# Patient Record
Sex: FEMALE | Race: WHITE | Employment: UNEMPLOYED | RURAL
[De-identification: names, ages, dates, MRNs, and addresses within clinical notes are randomized per-mention and may not be internally consistent; named-entity substitution may affect disease eponyms.]

---

## 2017-02-13 ENCOUNTER — OFFICE VISIT (OUTPATIENT)
Dept: FAMILY MEDICINE CLINIC | Age: 62
End: 2017-02-13

## 2017-02-13 VITALS
HEART RATE: 72 BPM | BODY MASS INDEX: 25.11 KG/M2 | OXYGEN SATURATION: 98 % | RESPIRATION RATE: 16 BRPM | DIASTOLIC BLOOD PRESSURE: 62 MMHG | SYSTOLIC BLOOD PRESSURE: 133 MMHG | HEIGHT: 61 IN | TEMPERATURE: 98 F | WEIGHT: 133 LBS

## 2017-02-13 DIAGNOSIS — J20.9 ACUTE BRONCHITIS, UNSPECIFIED ORGANISM: ICD-10-CM

## 2017-02-13 DIAGNOSIS — F17.200 SMOKER: ICD-10-CM

## 2017-02-13 DIAGNOSIS — E78.2 MIXED HYPERLIPIDEMIA: Primary | Chronic | ICD-10-CM

## 2017-02-13 DIAGNOSIS — G89.29 CHRONIC PAIN OF LEFT KNEE: ICD-10-CM

## 2017-02-13 DIAGNOSIS — M25.562 CHRONIC PAIN OF LEFT KNEE: ICD-10-CM

## 2017-02-13 DIAGNOSIS — M79.604 LEG PAIN, RIGHT: Chronic | ICD-10-CM

## 2017-02-13 RX ORDER — AZITHROMYCIN 250 MG/1
TABLET, FILM COATED ORAL
Qty: 6 TAB | Refills: 0 | Status: SHIPPED | OUTPATIENT
Start: 2017-02-13 | End: 2017-02-18

## 2017-02-13 NOTE — PROGRESS NOTES
Reviewed record in preparation for visit and have necessary documentation  Pt did not bring medication to office visit for review  Information was given to pt on Advanced Directives, Living Will  opportunity was given for questions  Goals that were addressed and/or need to be completed during or after this appointment include   Health Maintenance Due   Topic Date Due    BREAST CANCER SCRN MAMMOGRAM  06/14/2012    PAP AKA CERVICAL CYTOLOGY  05/08/2016

## 2017-02-13 NOTE — MR AVS SNAPSHOT
Visit Information Date & Time Provider Department Dept. Phone Encounter #  
 2/13/2017 10:30 AM Ryan Sargent MD [facility] [place] [id number] Follow-up Instructions Return in about 6 months (around 8/13/2017). Upcoming Health Maintenance Date Due  
 BREAST CANCER SCRN MAMMOGRAM 6/14/2012 PAP AKA CERVICAL CYTOLOGY 5/8/2016 COLONOSCOPY 7/27/2019 DTaP/Tdap/Td series (2 - Td) 12/10/2024 Allergies as of 2/13/2017  Review Complete On: 2/13/2017 By: Gen Abreu LPN Severity Noted Reaction Type Reactions Percocet [Oxycodone-acetaminophen]  03/23/2010    Nausea and Vomiting Current Immunizations  Reviewed on 3/25/2014 Name Date Influenza Vaccine 10/27/2015, 12/24/2012 Influenza Vaccine (Quad) PF 10/11/2016 Influenza Vaccine Split 10/20/2010, 9/10/2009 PPD 12/12/2011 Pneumococcal Conjugate (PCV-13) 12/29/2015 Pneumococcal Polysaccharide (PPSV-23) 3/25/2014 Pneumococcal Vaccine (Unspecified Type) 1/19/2005 TD Vaccine 3/22/2000 Tdap 12/10/2014 12:14 PM  
 Zoster Vaccine, Live 1/19/2016 Not reviewed this visit You Were Diagnosed With   
  
 Codes Comments Mixed hyperlipidemia    -  Primary ICD-10-CM: P12.7 ICD-9-CM: 272.2 Leg pain, right     ICD-10-CM: M79.604 ICD-9-CM: 729.5 Smoker     ICD-10-CM: L01.689 ICD-9-CM: 305.1 Acute bronchitis, unspecified organism     ICD-10-CM: J20.9 ICD-9-CM: 466.0 Chronic pain of left knee     ICD-10-CM: M25.562, G89.29 ICD-9-CM: 719.46, 338.29 Vitals BP Pulse Temp Resp Height(growth percentile) Weight(growth percentile) 133/62 (BP 1 Location: Right arm, BP Patient Position: Sitting) 72 98 °F (36.7 °C) (Oral) 16 5' 1\" (1.549 m) 133 lb (60.3 kg) LMP SpO2 BMI OB Status Smoking Status 01/01/2007 98% 25.13 kg/m2 Postmenopausal Current Every Day Smoker Vitals History BMI and BSA Data Body Mass Index Body Surface Area  
 25.13 kg/m 2 1.61 m 2 Preferred Pharmacy Pharmacy Name Phone [address] [phone] Your Updated Medication List  
  
   
This list is accurate as of: 2/13/17 11:05 AM.  Always use your most recent med list.  
  
  
  
  
 Lynco Keepers Take  by mouth. ASPIR-81 81 mg tablet Generic drug:  aspirin delayed-release Take 81 mg by mouth daily. azithromycin 250 mg tablet Commonly known as:  [place] Abu Take 2 tablets today, then take 1 tablet daily FISH OIL PO Take  by mouth.  
  
 gabapentin 100 mg capsule Commonly known as:  NEURONTIN Take 4 Caps by mouth nightly. Indications: NEUROPATHIC PAIN  
  
 promethazine 25 mg tablet Commonly known as:  PHENERGAN Take 1 Tab by mouth every six (6) hours as needed for Nausea. Indications: MOTION SICKNESS  
  
 VITAMIN DAILY PO Take  by mouth. Prescriptions Sent to Pharmacy Refills  
 azithromycin (ZITHROMAX) 250 mg tablet 0 Sig: Take 2 tablets today, then take 1 tablet daily Class: Normal  
 Pharmacy: [address] [facility] #: [phone] We Performed the Following LIPID PANEL [21612 CPT(R)] METABOLIC PANEL, COMPREHENSIVE [07656 CPT(R)] Follow-up Instructions Return in about 6 months (around 8/13/2017). To-Do List   
 02/13/2017 Imaging:  XR CHEST PA LAT   
  
 02/13/2017 Imaging:  XR KNEE LT 3 V   
  
 02/13/2017 11:10 AM  
  Appointment with  RAD XR RM 1 at [address] [place] [address] ([phone])  
  
 02/13/2017 11:15 AM  
  Appointment with [facility] RAD XR RM 1 at [address] [place] [address] ([phone]) [facility]! Dear Savanna Cervantes: 
Thank you for requesting a Dodonation account. Our records indicate that you already have an active Dodonation account.   You can access your account anytime at [email or web address] Did you know that you can access your hospital and ER discharge instructions at any time in StudyTube? You can also review all of your test results from your hospital stay or ER visit. Additional Information If you have questions, please visit the Frequently Asked Questions section of the StudyTube website at [email or web address] Remember, StudyTube is NOT to be used for urgent needs. For medical emergencies, dial 911. Now available from your iPhone and Android! Please provide this summary of care documentation to your next provider. Your primary care clinician is listed as [address]. If you have any questions after today's visit, please call [phone].

## 2017-02-14 ENCOUNTER — TELEPHONE (OUTPATIENT)
Dept: FAMILY MEDICINE CLINIC | Age: 62
End: 2017-02-14

## 2017-02-14 LAB
ALBUMIN SERPL-MCNC: 4.4 G/DL (ref 3.6–4.8)
ALBUMIN/GLOB SERPL: 1.8 {RATIO} (ref 1.1–2.5)
ALP SERPL-CCNC: 94 IU/L (ref 39–117)
ALT SERPL-CCNC: 17 IU/L (ref 0–32)
AST SERPL-CCNC: 18 IU/L (ref 0–40)
BILIRUB SERPL-MCNC: 0.2 MG/DL (ref 0–1.2)
BUN SERPL-MCNC: 20 MG/DL (ref 8–27)
BUN/CREAT SERPL: 28 (ref 11–26)
CALCIUM SERPL-MCNC: 9.5 MG/DL (ref 8.7–10.3)
CHLORIDE SERPL-SCNC: 101 MMOL/L (ref 96–106)
CHOLEST SERPL-MCNC: 218 MG/DL (ref 100–199)
CO2 SERPL-SCNC: 25 MMOL/L (ref 18–29)
CREAT SERPL-MCNC: 0.71 MG/DL (ref 0.57–1)
GLOBULIN SER CALC-MCNC: 2.4 G/DL (ref 1.5–4.5)
GLUCOSE SERPL-MCNC: 85 MG/DL (ref 65–99)
HDLC SERPL-MCNC: 70 MG/DL
LDLC SERPL CALC-MCNC: 130 MG/DL (ref 0–99)
POTASSIUM SERPL-SCNC: 4.5 MMOL/L (ref 3.5–5.2)
PROT SERPL-MCNC: 6.8 G/DL (ref 6–8.5)
SODIUM SERPL-SCNC: 142 MMOL/L (ref 134–144)
TRIGL SERPL-MCNC: 91 MG/DL (ref 0–149)
VLDLC SERPL CALC-MCNC: 18 MG/DL (ref 5–40)

## 2017-02-14 NOTE — TELEPHONE ENCOUNTER
Phone call to patient. Advised patient per Dr. Zarina Madden to make sure she is taking 1200 mg of calcium a day and at least 400 international units of vitamin D a day. Patient verbalized understanding.

## 2017-02-14 NOTE — TELEPHONE ENCOUNTER
She gets insurance we need to make sure that she gets a DEXA scan.  We do not want the osteopenia to develop into osteoporosis.  She needs to make sure she is taking 1200 mg of calcium a day and at least 400 international units of vitamin D a day.    Dr. Reji Kingston
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
## 2017-03-03 ENCOUNTER — TELEPHONE (OUTPATIENT)
Dept: FAMILY MEDICINE CLINIC | Age: 62
End: 2017-03-03

## 2017-03-03 DIAGNOSIS — J40 BRONCHITIS: Primary | ICD-10-CM

## 2017-03-03 RX ORDER — BENZONATATE 200 MG/1
200 CAPSULE ORAL
Qty: 21 CAP | Refills: 1 | Status: SHIPPED | OUTPATIENT
Start: 2017-03-03 | End: 2017-03-10

## 2017-03-03 NOTE — TELEPHONE ENCOUNTER
Nichelle CARIAS [facility] Front Office Pool                     Patient stated that she is still coughing during the day and at night since completing the antibiotic that Dr. Jodie Reich prescribed. She would like something to help with excessive coughing. Currently taking Mucinex DM and has gotten only some relief.  Best contact number is [phone]. Pharmacy is [facility]. Pharmacy info is on file.

## 2017-08-11 ENCOUNTER — OFFICE VISIT (OUTPATIENT)
Dept: FAMILY MEDICINE CLINIC | Age: 62
End: 2017-08-11

## 2017-08-11 VITALS
TEMPERATURE: 98.4 F | WEIGHT: 135.8 LBS | DIASTOLIC BLOOD PRESSURE: 63 MMHG | HEIGHT: 61 IN | HEART RATE: 73 BPM | RESPIRATION RATE: 16 BRPM | SYSTOLIC BLOOD PRESSURE: 130 MMHG | BODY MASS INDEX: 25.64 KG/M2 | OXYGEN SATURATION: 96 %

## 2017-08-11 DIAGNOSIS — K63.5 HYPERPLASTIC COLONIC POLYP, UNSPECIFIED PART OF COLON: Chronic | ICD-10-CM

## 2017-08-11 DIAGNOSIS — E78.2 MIXED HYPERLIPIDEMIA: Primary | Chronic | ICD-10-CM

## 2017-08-11 DIAGNOSIS — M17.12 ARTHRITIS OF LEFT KNEE: Chronic | ICD-10-CM

## 2017-08-11 DIAGNOSIS — Z12.31 ENCOUNTER FOR SCREENING MAMMOGRAM FOR HIGH-RISK PATIENT: ICD-10-CM

## 2017-08-11 DIAGNOSIS — M79.604 LEG PAIN, RIGHT: Chronic | ICD-10-CM

## 2017-08-11 DIAGNOSIS — Z87.19 HISTORY OF DIVERTICULITIS: Chronic | ICD-10-CM

## 2017-08-11 RX ORDER — MELOXICAM 15 MG/1
15 TABLET ORAL DAILY
Qty: 30 TAB | Refills: 4 | Status: SHIPPED | OUTPATIENT
Start: 2017-08-11 | End: 2017-12-15 | Stop reason: DRUGHIGH

## 2017-08-11 RX ORDER — ASCORBIC ACID 500 MG
TABLET ORAL
COMMUNITY
End: 2020-07-16
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
## 2017-08-11 NOTE — PROGRESS NOTES
Chief Complaint   Patient presents with    Cholesterol Problem     Body mass index is 25.66 kg/(m^2).     Reviewed record in preparation for visit and have necessary documentation  Pt did not bring medication to office visit for review  Information was given to pt on Advanced Directives, Living Will  Information was given on Shingles Vaccine  Opportunity was given for questions  Goals that were addressed and/or need to be completed after this appointment include:     Health Maintenance Due   Topic Date Due    BREAST CANCER SCRN MAMMOGRAM  06/14/2012    PAP AKA CERVICAL CYTOLOGY  05/08/2016    INFLUENZA AGE 9 TO ADULT  08/01/2017

## 2017-08-11 NOTE — MR AVS SNAPSHOT
Visit Information Date & Time Provider Department Dept. Phone Encounter #  
 8/11/2017  8:40 AM Jimbo Aguilar MD Kailyn Tucker [id number] Follow-up Instructions Return in about 4 months (around 12/11/2017), or if symptoms worsen or fail to improve. Upcoming Health Maintenance Date Due  
 BREAST CANCER SCRN MAMMOGRAM 6/14/2012 PAP AKA CERVICAL CYTOLOGY 5/8/2016 INFLUENZA AGE 9 TO ADULT 8/1/2017 COLONOSCOPY 7/27/2019 DTaP/Tdap/Td series (2 - Td) 12/10/2024 Allergies as of 8/11/2017  Review Complete On: 8/11/2017 By: iJmbo Aguilar MD  
  
 Severity Noted Reaction Type Reactions Percocet [Oxycodone-acetaminophen]  03/23/2010    Nausea and Vomiting Current Immunizations  Reviewed on 3/25/2014 Name Date Influenza Vaccine 10/27/2015, 12/24/2012 Influenza Vaccine (Quad) PF 10/11/2016 Influenza Vaccine Split 10/20/2010, 9/10/2009 PPD 12/12/2011 Pneumococcal Conjugate (PCV-13) 12/29/2015 Pneumococcal Polysaccharide (PPSV-23) 3/25/2014 TD Vaccine 3/22/2000 Tdap 12/10/2014 12:14 PM  
 ZZZ-RETIRED (DO NOT USE) Pneumococcal Vaccine (Unspecified Type) 1/19/2005 Zoster Vaccine, Live 1/19/2016 Not reviewed this visit You Were Diagnosed With   
  
 Codes Comments Mixed hyperlipidemia    -  Primary ICD-10-CM: J47.4 ICD-9-CM: 272.2 Leg pain, right     ICD-10-CM: M79.604 ICD-9-CM: 729.5 Hyperplastic colonic polyp, unspecified part of colon     ICD-10-CM: K63.5 ICD-9-CM: 211.3 History of diverticulitis     ICD-10-CM: Z87.19 ICD-9-CM: V12.70 Arthritis of left knee     ICD-10-CM: M17.12 
ICD-9-CM: 716.96 Encounter for screening mammogram for high-risk patient     ICD-10-CM: Z12.31 
ICD-9-CM: V76.11 Vitals BP Pulse Temp Resp Height(growth percentile) Weight(growth percentile)  130/63 (BP 1 Location: Left arm, BP Patient Position: Sitting) 73 98.4 °F (36.9 °C) (Oral) 16 5' 1\" (1.549 m) 135 lb 12.8 oz (61.6 kg) LMP SpO2 BMI OB Status Smoking Status 01/01/2007 96% 25.66 kg/m2 Postmenopausal Current Every Day Smoker Vitals History BMI and BSA Data Body Mass Index Body Surface Area  
 25.66 kg/m 2 1.63 m 2 Preferred Pharmacy Pharmacy Name Phone [facility] [place] [address] [facility] [phone] Your Updated Medication List  
  
   
This list is accurate as of: 8/11/17  9:02 AM.  Always use your most recent med list.  
  
  
  
  
 ASPIR-81 81 mg tablet Generic drug:  aspirin delayed-release Take 81 mg by mouth daily. FISH OIL PO Take  by mouth.  
  
 gabapentin 100 mg capsule Commonly known as:  NEURONTIN  
TAKE 4 CAPSULES BY MOUTH NIGHTLY  
  
 meloxicam 15 mg tablet Commonly known as:  MOBIC Take 1 Tab by mouth daily. Indications: OSTEOARTHRITIS  
  
 potassium 99 mg tablet Take 99 mg by mouth daily. promethazine 25 mg tablet Commonly known as:  PHENERGAN Take 1 Tab by mouth every six (6) hours as needed for Nausea. Indications: MOTION SICKNESS  
  
 VITAMIN C 500 mg tablet Generic drug:  ascorbic acid (vitamin C) Take  by mouth. VITAMIN DAILY PO Take  by mouth. Prescriptions Sent to Pharmacy Refills  
 meloxicam (MOBIC) 15 mg tablet 4 Sig: Take 1 Tab by mouth daily. Indications: OSTEOARTHRITIS Class: Normal  
 Pharmacy: [address] [place] [address] #: [phone] Route: Oral  
  
We Performed the Following HEMOGLOBIN [id number] CPT(R)] LIPID PANEL [57299 CPT(R)] METABOLIC PANEL, COMPREHENSIVE [70720 CPT(R)] Follow-up Instructions Return in about 4 months (around 12/11/2017), or if symptoms worsen or fail to improve. To-Do List   
 08/18/2017 Imaging:  STEPHANIE MAMMO BI DX INCL CAD Patient Instructions Osteoarthritis: Care Instructions Your Care Instructions Arthritis is a common health problem in which the joints are inflamed. There are several kinds of arthritis. Osteoarthritis is caused by a breakdown of cartilage, the hard, thick tissue that cushions the joints. It causes pain, stiffness, and swelling, often in the spine, fingers, hips, and knees. Osteoarthritis can happen at any age, but it is most common in older people. Osteoarthritis never goes away completely, but it can be controlled. Medicine and home treatment can reduce the pain and prevent the arthritis from getting worse. Follow-up care is a key part of your treatment and safety. Be sure to make and go to all appointments, and call your doctor if you are having problems. It's also a good idea to know your test results and keep a list of the medicines you take. How can you care for yourself at home? · Take a warm shower or bath in the morning to relieve stiffness. Avoid sitting still afterwards. · If the joint is not swollen, use moist heat, like a warm, damp towel, for 20 to 30 minutes, 2 or 3 times a day. Do not use heat on a swollen joint. · If the joint is swollen, use ice or cold packs for 10 to 20 minutes, once an hour. Cold will help relieve pain and reduce inflammation. Put a thin cloth between the ice and your skin. · To prevent stiffness, gently move the joint through its full range of motion several times a day. · If the joint hurts, avoid activities that put a strain on it for a few days. Take rest breaks throughout the day. · Get regular exercise. Walking, swimming, yoga, biking, leon chi, and water aerobics are good exercises that are gentle on the joints. · Reach and stay at a healthy weight. If you need to lose or maintain weight, regular exercise and a healthy diet will help. Extra weight can strain the joints, especially the knees and hips, and make the pain worse. Losing even a few pounds may help. · Take pain medicines exactly as directed. ¨ If the doctor gave you a prescription medicine for pain, take it as prescribed. ¨ If you are not taking a prescription pain medicine, ask your doctor if you can take an over-the-counter medicine. When should you call for help? Call your doctor now or seek immediate medical care if: · The pain is so bad that you cannot use the joint. · You have sudden back pain with weakness in your legs or loss of bowel or bladder control. · Your stools are black and tarlike or have streaks of blood. · You have severe pain and swelling in more than one joint. Watch closely for changes in your health, and be sure to contact your doctor if: 
· You have side effects from the medicines, like belly pain, ongoing heartburn, or nausea. · Joint pain continues for more than 6 weeks, and home treatment is not helping. Where can you learn more? Go to http://bea-aurora.info/. Enter C715 in the search box to learn more about \"Osteoarthritis: Care Instructions. \" Current as of: November 28, 2016 Content Version: 11.3 © 8576-9569 independenceIT. Care instructions adapted under license by Cursa.me (which disclaims liability or warranty for this information). If you have questions about a medical condition or this instruction, always ask your healthcare professional. [address] any warranty or liability for your use of this information. Introducing [facility]! Dear Nancy Hammonds: 
Thank you for requesting a Powelectrics account. Our records indicate that you already have an active Powelectrics account. You can access your account anytime at [email or web address] Did you know that you can access your hospital and ER discharge instructions at any time in Powelectrics? You can also review all of your test results from your hospital stay or ER visit. Additional Information If you have questions, please visit the Frequently Asked Questions section of the RiG[employer] website at [email or web address] Remember, [employer] is NOT to be used for urgent needs. For medical emergencies, dial 911. Now available from your iPhone and Android! Please provide this summary of care documentation to your next provider. Your primary care clinician is listed as Π[address] 90. If you have any questions after today's visit, please call [phone].

## 2017-08-11 NOTE — PATIENT INSTRUCTIONS
Osteoarthritis: Care Instructions  Your Care Instructions    Arthritis is a common health problem in which the joints are inflamed. There are several kinds of arthritis. Osteoarthritis is caused by a breakdown of cartilage, the hard, thick tissue that cushions the joints. It causes pain, stiffness, and swelling, often in the spine, fingers, hips, and knees. Osteoarthritis can happen at any age, but it is most common in older people. Osteoarthritis never goes away completely, but it can be controlled. Medicine and home treatment can reduce the pain and prevent the arthritis from getting worse. Follow-up care is a key part of your treatment and safety. Be sure to make and go to all appointments, and call your doctor if you are having problems. It's also a good idea to know your test results and keep a list of the medicines you take. How can you care for yourself at home? · Take a warm shower or bath in the morning to relieve stiffness. Avoid sitting still afterwards. · If the joint is not swollen, use moist heat, like a warm, damp towel, for 20 to 30 minutes, 2 or 3 times a day. Do not use heat on a swollen joint. · If the joint is swollen, use ice or cold packs for 10 to 20 minutes, once an hour. Cold will help relieve pain and reduce inflammation. Put a thin cloth between the ice and your skin. · To prevent stiffness, gently move the joint through its full range of motion several times a day. · If the joint hurts, avoid activities that put a strain on it for a few days. Take rest breaks throughout the day. · Get regular exercise. Walking, swimming, yoga, biking, leon chi, and water aerobics are good exercises that are gentle on the joints. · Reach and stay at a healthy weight. If you need to lose or maintain weight, regular exercise and a healthy diet will help. Extra weight can strain the joints, especially the knees and hips, and make the pain worse. Losing even a few pounds may help.   · Take pain medicines exactly as directed. ¨ If the doctor gave you a prescription medicine for pain, take it as prescribed. ¨ If you are not taking a prescription pain medicine, ask your doctor if you can take an over-the-counter medicine. When should you call for help? Call your doctor now or seek immediate medical care if:  · The pain is so bad that you cannot use the joint. · You have sudden back pain with weakness in your legs or loss of bowel or bladder control. · Your stools are black and tarlike or have streaks of blood. · You have severe pain and swelling in more than one joint. Watch closely for changes in your health, and be sure to contact your doctor if:  · You have side effects from the medicines, like belly pain, ongoing heartburn, or nausea. · Joint pain continues for more than 6 weeks, and home treatment is not helping. Where can you learn more? Go to http://bea-aurora.info/. Enter G265 in the search box to learn more about \"Osteoarthritis: Care Instructions. \"  Current as of: November 28, 2016  Content Version: 11.3  © 0236-6555 CloudSway. Care instructions adapted under license by CausePlay (which disclaims liability or warranty for this information). If you have questions about a medical condition or this instruction, always ask your healthcare professional. [address] any warranty or liability for your use of this information.

## 2017-08-19 LAB
ALBUMIN SERPL-MCNC: 4.1 G/DL (ref 3.6–4.8)
ALBUMIN/GLOB SERPL: 1.6 {RATIO} (ref 1.2–2.2)
ALP SERPL-CCNC: 74 IU/L (ref 39–117)
ALT SERPL-CCNC: 15 IU/L (ref 0–32)
AST SERPL-CCNC: 18 IU/L (ref 0–40)
BILIRUB SERPL-MCNC: 0.5 MG/DL (ref 0–1.2)
BUN SERPL-MCNC: 22 MG/DL (ref 8–27)
BUN/CREAT SERPL: 24 (ref 12–28)
CALCIUM SERPL-MCNC: 9.4 MG/DL (ref 8.7–10.3)
CHLORIDE SERPL-SCNC: 100 MMOL/L (ref 96–106)
CHOLEST SERPL-MCNC: 215 MG/DL (ref 100–199)
CO2 SERPL-SCNC: 26 MMOL/L (ref 18–29)
CREAT SERPL-MCNC: 0.9 MG/DL (ref 0.57–1)
GLOBULIN SER CALC-MCNC: 2.5 G/DL (ref 1.5–4.5)
GLUCOSE SERPL-MCNC: 79 MG/DL (ref 65–99)
HDLC SERPL-MCNC: 65 MG/DL
HGB BLD-MCNC: 13.8 G/DL (ref 11.1–15.9)
LDLC SERPL CALC-MCNC: 138 MG/DL (ref 0–99)
POTASSIUM SERPL-SCNC: 5.2 MMOL/L (ref 3.5–5.2)
PROT SERPL-MCNC: 6.6 G/DL (ref 6–8.5)
SODIUM SERPL-SCNC: 140 MMOL/L (ref 134–144)
TRIGL SERPL-MCNC: 62 MG/DL (ref 0–149)
VLDLC SERPL CALC-MCNC: 12 MG/DL (ref 5–40)

## 2017-08-25 ENCOUNTER — HOSPITAL ENCOUNTER (OUTPATIENT)
Dept: LAB | Age: 62
Discharge: HOME | End: 2017-08-25
Payer: SELF-PAY

## 2017-08-25 ENCOUNTER — OFFICE VISIT (OUTPATIENT)
Dept: FAMILY MEDICINE CLINIC | Age: 62
End: 2017-08-25

## 2017-08-25 VITALS
RESPIRATION RATE: 18 BRPM | TEMPERATURE: 98.2 F | OXYGEN SATURATION: 98 % | DIASTOLIC BLOOD PRESSURE: 66 MMHG | HEIGHT: 61 IN | SYSTOLIC BLOOD PRESSURE: 146 MMHG | BODY MASS INDEX: 25.49 KG/M2 | WEIGHT: 135 LBS | HEART RATE: 65 BPM

## 2017-08-25 DIAGNOSIS — Z78.0 POST-MENOPAUSAL: ICD-10-CM

## 2017-08-25 DIAGNOSIS — Z01.419 WELL WOMAN EXAM WITH ROUTINE GYNECOLOGICAL EXAM: Primary | ICD-10-CM

## 2017-08-25 PROCEDURE — 88175 CYTOPATH C/V AUTO FLUID REDO: CPT | Performed by: FAMILY MEDICINE

## 2017-08-25 PROCEDURE — 87624 HPV HI-RISK TYP POOLED RSLT: CPT | Performed by: FAMILY MEDICINE

## 2017-08-25 NOTE — PATIENT INSTRUCTIONS

## 2017-08-25 NOTE — PROGRESS NOTES
Well Woman Check Up       Subjective:     60 y.o. CaucasianF for routine annual exam    LMP:Patient's last menstrual period was 01/01/2007. Menses: No, postmenopausal    Method of protection: none. Social History: not sexually active. Pertinent past medical history: current smoker. PAP History:   5/2013: Negative cytology    Colonoscopy:  NExt due 2019    Mammogram:    Menarche:  Post-menopausal  LMP: Patient's last menstrual period was 01/01/2007. .    # of Children:  2   Hysterectomy/oophorectomy:  No/No.    Breast Bx: No.    Hx of Breast Feeding:  no. BCP:  No.   Hormone therapy: No.   FamHx of breast ca in first degree relative: No    DEXA: None    Lipids: 8/18      Allergies   Allergen Reactions    Percocet [Oxycodone-Acetaminophen] Nausea and Vomiting     Past Surgical History:   Procedure Laterality Date    ABDOMEN SURGERY PROC UNLISTED      ruptured spleen    HX ORTHOPAEDIC      multiple fxs     Family History   Problem Relation Age of Onset    Diabetes Mother     Hypertension Mother     Heart Disease Mother     Thyroid Disease Mother     Diabetes Sister     Heart Disease Sister     Hypertension Sister     Thyroid Disease Sister     Cancer Brother      brain     Social History   Substance Use Topics    Smoking status: Current Every Day Smoker     Packs/day: 0.50     Years: 25.00    Smokeless tobacco: Never Used    Alcohol use Yes      Comment: occasional        ROS:  Feeling well. No dyspnea or chest pain on exertion. No abdominal pain, change in bowel habits, black or bloody stools. No urinary tract symptoms. GYN ROS: no breast pain or new or enlarging lumps on self exam, no vaginal bleeding, no discharge or pelvic pain, no hot flashes. No neurological complaints.     Objective:     Visit Vitals    /66 (BP 1 Location: Left arm, BP Patient Position: Sitting)    Pulse 65    Temp 98.2 °F (36.8 °C) (Oral)    Resp 18    Ht 5' 1\" (1.549 m)    Wt 135 lb (61.2 kg)    LMP 01/01/2007    SpO2 98%    BMI 25.51 kg/m2     Gen: The patient appears well, alert, oriented x 3, in no distress. HEENT:  Normal, atraumatic, FREDI. Neck: supple. No adenopathy or thyromegaly. Lungs:  clear, good air entry, no wheezes, rhonchi or rales. CV: S1 and S2 normal, no murmurs, regular rate and rhythm. Abdomen: soft without tenderness, guarding, mass or organomegaly. Extremities:  no edema, normal peripheral pulses. Neurological:normal, no focal findings. BREAST EXAM: breasts appear normal, no suspicious masses, no skin or nipple changes or axillary nodes. PELVIC EXAM: normal external genitalia, vulva, vagina, cervix, uterus and adnexa exam chaperoned by: Aydee Candelario    Assessment/Plan:     Encounter Diagnoses     ICD-10-CM ICD-9-CM   1. Well woman exam with routine gynecological exam Z01.419 V72.31   2. Post-menopausal Z78.0 V49.81       1. Well woman exam with routine gynecological exam  Has order for Mammogram.   - PAP IG, APTIMA HPV AND RFX 16/18,45 ([id number])    2. Post-menopausal  - DEXA BONE DENSITY STUDY AXIAL; Future        Jessica Dewitt MD  08/25/17    Follow-up Disposition:  Return in about 1 year (around 8/25/2018) for [address] [place] [address],[address].     Future Appointments  Date Time Provider Chandra Aceves   12/15/2017 8:00 AM Nancy Garcia MD [facility]

## 2017-08-25 NOTE — PROGRESS NOTES
Reviewed record in preparation for visit and have necessary documentation  Pt did not bring medication to office visit for review    Goals that were addressed and/or need to be completed during or after this appointment include   Health Maintenance Due   Topic Date Due    BREAST CANCER SCRN MAMMOGRAM  06/14/2012    PAP AKA CERVICAL CYTOLOGY  05/08/2016    INFLUENZA AGE 9 TO ADULT  08/01/2017

## 2017-08-25 NOTE — MR AVS SNAPSHOT
Visit Information Date & Time Provider Department Dept. Phone Encounter #  
 8/25/2017  8:20 AM [facility] [place] [id number] Follow-up Instructions Return in about 1 year (around 8/25/2018) for [facility]. Your Appointments 12/15/2017  8:00 AM  
ROUTINE CARE with Rafa Rowell MD  
[address]) Appt Note: 4 mo f/u-Mixed Hyperlipidemia [address] [facility] [address]  
[place] [address] [place] [id number] Upcoming Health Maintenance Date Due  
 BREAST CANCER SCRN MAMMOGRAM 6/14/2012 PAP AKA CERVICAL CYTOLOGY 5/8/2016 INFLUENZA AGE 9 TO ADULT 8/1/2017 COLONOSCOPY 7/27/2019 DTaP/Tdap/Td series (2 - Td) 12/10/2024 Allergies as of 8/25/2017  Review Complete On: 8/25/2017 By: Don Rosenberg MD  
  
 Severity Noted Reaction Type Reactions Percocet [Oxycodone-acetaminophen]  03/23/2010    Nausea and Vomiting Current Immunizations  Reviewed on 3/25/2014 Name Date Influenza Vaccine 10/27/2015, 12/24/2012 Influenza Vaccine (Quad) PF 10/11/2016 Influenza Vaccine Split 10/20/2010, 9/10/2009 PPD 12/12/2011 Pneumococcal Conjugate (PCV-13) 12/29/2015 Pneumococcal Polysaccharide (PPSV-23) 3/25/2014 TD Vaccine 3/22/2000 Tdap 12/10/2014 12:14 PM  
 ZZZ-RETIRED (DO NOT USE) Pneumococcal Vaccine (Unspecified Type) 1/19/2005 Zoster Vaccine, Live 1/19/2016 Not reviewed this visit You Were Diagnosed With   
  
 Codes Comments Well woman exam with routine gynecological exam    -  Primary ICD-10-CM: W82.604 ICD-9-CM: V72.31 Post-menopausal     ICD-10-CM: Z78.0 ICD-9-CM: V49.81 Vitals BP Pulse Temp Resp Height(growth percentile) Weight(growth percentile)  146/66 (BP 1 Location: Left arm, BP Patient Position: Sitting) 65 98.2 °F (36.8 °C) (Oral) 18 5' 1\" (1.549 m) 135 lb (61.2 kg) LMP SpO2 BMI OB Status Smoking Status 01/01/2007 98% 25.51 kg/m2 Postmenopausal Current Every Day Smoker Vitals History BMI and BSA Data Body Mass Index Body Surface Area 25.51 kg/m 2 1.62 m 2 Preferred Pharmacy Pharmacy Name Phone [facility] [address] [phone] Your Updated Medication List  
  
   
This list is accurate as of: 8/25/17  9:05 AM.  Always use your most recent med list.  
  
  
  
  
 ASPIR-81 81 mg tablet Generic drug:  aspirin delayed-release Take 81 mg by mouth daily. FISH OIL PO Take  by mouth.  
  
 gabapentin 100 mg capsule Commonly known as:  NEURONTIN  
TAKE 4 CAPSULES BY MOUTH NIGHTLY  
  
 meloxicam 15 mg tablet Commonly known as:  MOBIC Take 1 Tab by mouth daily. Indications: OSTEOARTHRITIS  
  
 potassium 99 mg tablet Take 99 mg by mouth daily. promethazine 25 mg tablet Commonly known as:  PHENERGAN Take 1 Tab by mouth every six (6) hours as needed for Nausea. Indications: MOTION SICKNESS  
  
 VITAMIN C 500 mg tablet Generic drug:  ascorbic acid (vitamin C) Take  by mouth. VITAMIN DAILY PO Take  by mouth. We Performed the Following PAP IG, APTIMA HPV AND RFX 16/18,45 ([id number]) [[id number] Custom] Follow-up Instructions Return in about 1 year (around 8/25/2018) for [facility]. To-Do List   
 08/25/2017 Imaging:  DEXA BONE DENSITY STUDY AXIAL Patient Instructions Stopping Smoking: Care Instructions Your Care Instructions Cigarette smokers crave the nicotine in cigarettes. Giving it up is much harder than simply changing a habit. Your body has to stop craving the nicotine. It is hard to quit, but you can do it. There are many tools that people use to quit smoking. You may find that combining tools works best for you. There are several steps to quitting. First you get ready to quit. Then you get support to help you. After that, you learn new skills and behaviors to become a nonsmoker. For many people, a necessary step is getting and using medicine. Your doctor will help you set up the plan that best meets your needs. You may want to attend a smoking cessation program to help you quit smoking. When you choose a program, look for one that has proven success. Ask your doctor for ideas. You will greatly increase your chances of success if you take medicine as well as get counseling or join a cessation program. 
Some of the changes you feel when you first quit tobacco are uncomfortable. Your body will miss the nicotine at first, and you may feel short-tempered and grumpy. You may have trouble sleeping or concentrating. Medicine can help you deal with these symptoms. You may struggle with changing your smoking habits and rituals. The last step is the tricky one: Be prepared for the smoking urge to continue for a time. This is a lot to deal with, but keep at it. You will feel better. Follow-up care is a key part of your treatment and safety. Be sure to make and go to all appointments, and call your doctor if you are having problems. Its also a good idea to know your test results and keep a list of the medicines you take. How can you care for yourself at home? · Ask your family, friends, and coworkers for support. You have a better chance of quitting if you have help and support. · Join a support group, such as Nicotine Anonymous, for people who are trying to quit smoking. · Consider signing up for a smoking cessation program, such as the American Lung Association's Freedom from Smoking program. 
· Set a quit date. Pick your date carefully so that it is not right in the middle of a big deadline or stressful time. Once you quit, do not even take a puff.  Get rid of all ashtrays and lighters after your last cigarette. Clean your house and your clothes so that they do not smell of smoke. · Learn how to be a nonsmoker. Think about ways you can avoid those things that make you reach for a cigarette. ¨ Avoid situations that put you at greatest risk for smoking. For some people, it is hard to have a drink with friends without smoking. For others, they might skip a coffee break with coworkers who smoke. ¨ Change your daily routine. Take a different route to work or eat a meal in a different place. · Cut down on stress. Calm yourself or release tension by doing an activity you enjoy, such as reading a book, taking a hot bath, or gardening. · Talk to your doctor or pharmacist about nicotine replacement therapy, which replaces the nicotine in your body. You still get nicotine but you do not use tobacco. Nicotine replacement products help you slowly reduce the amount of nicotine you need. These products come in several forms, many of them available over-the-counter: ¨ Nicotine patches ¨ Nicotine gum and lozenges ¨ Nicotine inhaler · Ask your doctor about bupropion (Wellbutrin) or varenicline (Chantix), which are prescription medicines. They do not contain nicotine. They help you by reducing withdrawal symptoms, such as stress and anxiety. · Some people find hypnosis, acupuncture, and massage helpful for ending the smoking habit. · Eat a healthy diet and get regular exercise. Having healthy habits will help your body move past its craving for nicotine. · Be prepared to keep trying. Most people are not successful the first few times they try to quit. Do not get mad at yourself if you smoke again. Make a list of things you learned and think about when you want to try again, such as next week, next month, or next year. Where can you learn more? Go to http://bea-aurora.info/. Enter A061 in the search box to learn more about \"Stopping Smoking: Care Instructions. \" Current as of: March 20, 2017 Content Version: 11.3 © 4791-0169 [employer]. Care instructions adapted under license by Mersana Therapeutics (which disclaims liability or warranty for this information). If you have questions about a medical condition or this instruction, always ask your healthcare professional. [address] any warranty or liability for your use of this information. Introducing [facility]! Dear Webb Barthel: 
Thank you for requesting a b-datum account. Our records indicate that you already have an active b-datum account. You can access your account anytime at [email or web address] Did you know that you can access your hospital and ER discharge instructions at any time in b-datum? You can also review all of your test results from your hospital stay or ER visit. Additional Information If you have questions, please visit the Frequently Asked Questions section of the b-datum website at [email or web address] Remember, b-datum is NOT to be used for urgent needs. For medical emergencies, dial 911. Now available from your iPhone and Android! Please provide this summary of care documentation to your next provider. Your primary care clinician is listed as Πάν[address] 90. If you have any questions after today's visit, please call [phone].

## 2017-09-29 ENCOUNTER — CLINICAL SUPPORT (OUTPATIENT)
Dept: FAMILY MEDICINE CLINIC | Age: 62
End: 2017-09-29

## 2017-09-29 DIAGNOSIS — Z23 ENCOUNTER FOR IMMUNIZATION: Primary | ICD-10-CM

## 2017-10-20 DIAGNOSIS — M79.604 LEG PAIN, RIGHT: Chronic | ICD-10-CM

## 2017-10-20 RX ORDER — GABAPENTIN 100 MG/1
CAPSULE ORAL
Qty: 120 CAP | Refills: 0 | Status: SHIPPED | OUTPATIENT
Start: 2017-10-20 | End: 2017-12-15 | Stop reason: SDUPTHER

## 2017-12-15 ENCOUNTER — OFFICE VISIT (OUTPATIENT)
Dept: FAMILY MEDICINE CLINIC | Age: 62
End: 2017-12-15

## 2017-12-15 ENCOUNTER — TELEPHONE (OUTPATIENT)
Dept: FAMILY MEDICINE CLINIC | Age: 62
End: 2017-12-15

## 2017-12-15 VITALS
HEIGHT: 61 IN | RESPIRATION RATE: 20 BRPM | TEMPERATURE: 97.6 F | WEIGHT: 138 LBS | DIASTOLIC BLOOD PRESSURE: 76 MMHG | SYSTOLIC BLOOD PRESSURE: 140 MMHG | BODY MASS INDEX: 26.06 KG/M2 | OXYGEN SATURATION: 97 % | HEART RATE: 64 BPM

## 2017-12-15 DIAGNOSIS — M79.604 LEG PAIN, RIGHT: Chronic | ICD-10-CM
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
## 2017-12-15 DIAGNOSIS — E78.2 MIXED HYPERLIPIDEMIA: Primary | Chronic | ICD-10-CM

## 2017-12-15 DIAGNOSIS — M54.31 RIGHT SCIATIC NERVE PAIN: ICD-10-CM

## 2017-12-15 RX ORDER — GABAPENTIN 300 MG/1
600 CAPSULE ORAL EVERY EVENING
Qty: 60 CAP | Refills: 4 | Status: SHIPPED | OUTPATIENT
Start: 2017-12-15 | End: 2018-04-13 | Stop reason: SDUPTHER

## 2017-12-15 RX ORDER — MELOXICAM 7.5 MG/1
7.5 TABLET ORAL DAILY
Qty: 30 TAB | Refills: 0 | Status: SHIPPED | OUTPATIENT
Start: 2017-12-15 | End: 2018-01-02 | Stop reason: SDUPTHER
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
## 2017-12-15 NOTE — PROGRESS NOTES
Chief Complaint   Patient presents with    Cholesterol Problem     Visit Vitals    /76 (BP 1 Location: Left arm, BP Patient Position: Sitting)    Pulse 64    Temp 97.6 °F (36.4 °C) (Oral)    Resp 20    Ht 5' 1\" (1.549 m)    Wt 138 lb (62.6 kg)    LMP 01/01/2007    SpO2 97%    BMI 26.07 kg/m2

## 2017-12-15 NOTE — TELEPHONE ENCOUNTER
Informed patient per Dr. Davila Miss:        Please call patient she has degenerative disc disease at L4 through S1 which is the lower part of her lumbar spine.  Her spine is also rotated slightly to the left.  This certainly explains her sciatica.  Recommend physical therapy as this is been shown to be as effective his back surgery over the long haul.                Patient states she does not have health insurance. She states the policies are still not affordable. Her daughter's friend is a physical therapist and she will see if she can treat her. Patient verbalized agreement and understanding of the above.

## 2017-12-15 NOTE — PATIENT INSTRUCTIONS

## 2017-12-21 ENCOUNTER — OFFICE VISIT (OUTPATIENT)
Dept: FAMILY MEDICINE CLINIC | Age: 62
End: 2017-12-21

## 2017-12-21 VITALS
HEIGHT: 61 IN | DIASTOLIC BLOOD PRESSURE: 68 MMHG | TEMPERATURE: 97.9 F | HEART RATE: 67 BPM | OXYGEN SATURATION: 96 % | WEIGHT: 139 LBS | BODY MASS INDEX: 26.24 KG/M2 | SYSTOLIC BLOOD PRESSURE: 175 MMHG | RESPIRATION RATE: 20 BRPM

## 2017-12-21 DIAGNOSIS — I10 ESSENTIAL HYPERTENSION: Primary | ICD-10-CM

## 2017-12-21 RX ORDER — LISINOPRIL 20 MG/1
10 TABLET ORAL DAILY
Qty: 30 TAB | Refills: 1 | Status: SHIPPED | OUTPATIENT
Start: 2017-12-21 | End: 2017-12-21 | Stop reason: SDUPTHER

## 2017-12-21 RX ORDER — LISINOPRIL 20 MG/1
10 TABLET ORAL DAILY
Qty: 30 TAB | Refills: 1 | Status: SHIPPED | OUTPATIENT
Start: 2017-12-21 | End: 2018-02-15 | Stop reason: SDUPTHER

## 2017-12-21 NOTE — MR AVS SNAPSHOT
Visit Information Date & Time Provider Department Dept. Phone Encounter #  
 12/21/2017  3:45 PM Leno Natarajan MD [address] [place] [address] [id number] Follow-up Instructions Return in about 4 weeks (around 1/18/2018) for f/u BP. Upcoming Health Maintenance Date Due COLONOSCOPY 7/27/2019 PAP AKA CERVICAL CYTOLOGY 8/25/2022 DTaP/Tdap/Td series (2 - Td) 12/10/2024 Allergies as of 12/21/2017  Review Complete On: 12/21/2017 By: Seamus Lee LPN Severity Noted Reaction Type Reactions Percocet [Oxycodone-acetaminophen]  03/23/2010    Nausea and Vomiting Current Immunizations  Reviewed on 12/15/2017 Name Date Influenza Vaccine 9/29/2017, 10/27/2015, 12/24/2012 Influenza Vaccine (Quad) PF 10/11/2016 Influenza Vaccine Split 10/20/2010, 9/10/2009 PPD 12/12/2011 Pneumococcal Conjugate (PCV-13) 12/29/2015 Pneumococcal Polysaccharide (PPSV-23) 3/25/2014 TD Vaccine 3/22/2000 Tdap 12/10/2014 12:14 PM  
 ZZZ-RETIRED (DO NOT USE) Pneumococcal Vaccine (Unspecified Type) 1/19/2005 Zoster Vaccine, Live 1/19/2016 Not reviewed this visit You Were Diagnosed With   
  
 Codes Comments Essential hypertension    -  Primary ICD-10-CM: I10 
ICD-9-CM: 401.9 Vitals BP Pulse Temp Resp Height(growth percentile) Weight(growth percentile) 175/68 (BP 1 Location: Left arm, BP Patient Position: Sitting) 67 97.9 °F (36.6 °C) (Oral) 20 5' 1\" (1.549 m) 139 lb (63 kg) LMP SpO2 BMI OB Status Smoking Status 01/01/2007 96% 26.26 kg/m2 Postmenopausal Current Every Day Smoker Vitals History BMI and BSA Data Body Mass Index Body Surface Area  
 26.26 kg/m 2 1.65 m 2 Preferred Pharmacy Pharmacy Name Phone [address] [place], [place] - [address] [phone] Your Updated Medication List  
  
   
 This list is accurate as of: 12/21/17  4:00 PM.  Always use your most recent med list.  
  
  
  
  
 ASPIR-81 81 mg tablet Generic drug:  aspirin delayed-release Take 81 mg by mouth daily. FISH OIL PO Take  by mouth.  
  
 gabapentin 300 mg capsule Commonly known as:  NEURONTIN Take 2 Caps by mouth every evening. Indications: NEUROPATHIC PAIN  
  
 lisinopril 20 mg tablet Commonly known as:  Marge Kim Take 0.5 Tabs by mouth daily. meloxicam 7.5 mg tablet Commonly known as:  MOBIC Take 1 Tab by mouth daily. Indications: OSTEOARTHRITIS  
  
 potassium 99 mg tablet Take 99 mg by mouth daily. promethazine 25 mg tablet Commonly known as:  PHENERGAN Take 1 Tab by mouth every six (6) hours as needed for Nausea. Indications: MOTION SICKNESS  
  
 VITAMIN C 500 mg tablet Generic drug:  ascorbic acid (vitamin C) Take  by mouth. VITAMIN DAILY PO Take  by mouth. Prescriptions Sent to Pharmacy Refills  
 lisinopril (PRINIVIL, ZESTRIL) 20 mg tablet 1 Sig: Take 0.5 Tabs by mouth daily. Class: Normal  
 Pharmacy: [address] [place] [address] #: [phone] Route: Oral  
  
We Performed the Following [facility] BP FLOWSHEET [[id number] CPT(R)] Follow-up Instructions Return in about 4 weeks (around 1/18/2018) for f/u BP. Patient Instructions Please send a message or call if your blood pressure is still 150 or above on the top or 90 on the bottom after 2 weeks of taking the medication. Lisinopril (By mouth) Lisinopril (lye-SIN-oh-pril) Treats high blood pressure and heart failure. Also given to reduce the risk of death after a heart attack. This medicine is an ACE inhibitor. Brand Name(s): Prinivil, Qbrelis, Zestril There may be other brand names for this medicine. When This Medicine Should Not Be Used: This medicine is not right for everyone. Do not use it if you had an allergic reaction to lisinopril or another ACE inhibitor, or if you are pregnant. How to Use This Medicine:  
Liquid, Tablet · Take your medicine as directed. Your dose may need to be changed several times to find what works best for you. · Oral liquid: Measure the oral liquid medicine with a marked measuring spoon, oral syringe, or medicine cup. · Missed dose: Take a dose as soon as you remember. If it is almost time for your next dose, wait until then and take a regular dose. Do not take extra medicine to make up for a missed dose. · Store the medicine in a closed container at room temperature, away from heat, moisture, and direct light. Drugs and Foods to Avoid: Ask your doctor or pharmacist before using any other medicine, including over-the-counter medicines, vitamins, and herbal products. · Do not use this medicine together with aliskiren if you have diabetes. · Some foods and medicines may affect how lisinopril works. Tell your doctor if you are using any of the following: ¨ Aliskiren, everolimus, lithium, sirolimus, temsirolimus ¨ Another blood pressure medicine, including an angiotensin receptor blocker (ARB) ¨ Diuretic (water pill, including amiloride, spironolactone, triamterene) ¨ Insulin or diabetes medicine ¨ NSAID pain or arthritis medicine (including aspirin, celecoxib, diclofenac, ibuprofen, naproxen) · Ask your doctor before you use any medicine, supplement, or salt substitute that contains potassium. Warnings While Using This Medicine: · It is not safe to take this medicine during pregnancy. It could harm an unborn baby. Tell your doctor right away if you become pregnant. · Tell your doctor if you are breastfeeding, or if you have kidney disease, liver disease, diabetes, or heart or blood vessel disease. · This medicine may cause the following problems: ¨ Angioedema (severe swelling) ¨ Kidney problems ¨ Serious liver problems · This medicine could lower your blood pressure too much, especially when you first use it or if you are dehydrated. Stand or sit up slowly if you feel lightheaded or dizzy. · Do not stop using this medicine without asking your doctor, even if you feel well. This medicine will not cure your high blood pressure, but it will help keep it in a normal range. You may have to take blood pressure medicine for the rest of your life. · Tell any doctor or dentist who treats you that you are using this medicine. · Your doctor will do lab tests at regular visits to check on the effects of this medicine. Keep all appointments. · Keep all medicine out of the reach of children. Never share your medicine with anyone. Possible Side Effects While Using This Medicine:  
Call your doctor right away if you notice any of these side effects: · Allergic reaction: Itching or hives, swelling in your face or hands, swelling or tingling in your mouth or throat, chest tightness, trouble breathing · Blistering, peeling, or red skin rash · Change in how much or how often you urinate · Confusion, weakness, uneven heartbeat, trouble breathing, numbness or tingling in your hands, feet, or lips · Dark urine or pale stools, nausea, vomiting, loss of appetite, stomach pain, yellow skin or eyes · Fever, chills, sore throat, body aches · Lightheadedness, dizziness, fainting · Severe stomach pain (with or without nausea or vomiting) If you notice these less serious side effects, talk with your doctor: · Dry cough If you notice other side effects that you think are caused by this medicine, tell your doctor. Call your doctor for medical advice about side effects. You may report side effects to FDA at 1-501-CTQ-6340 © 2017 [facility] Information is for End User's use only and may not be sold, redistributed or otherwise used for commercial purposes. The above information is an  only. It is not intended as medical advice for individual conditions or treatments. Talk to your doctor, nurse or pharmacist before following any medical regimen to see if it is safe and effective for you. Home Blood Pressure Test: About This Test 
What is it? A home blood pressure test allows you to keep track of your blood pressure at home. Blood pressure is a measure of the force of blood against the walls of your arteries. Blood pressure readings include two numbers, such as 130/80 (say \"130 over 80\"). The first number is the systolic pressure. The second number is the diastolic pressure. Why is this test done? You may do this test at home to: · Find out if you have high blood pressure. · Track your blood pressure if you have high blood pressure. · Track how well medicine is working to reduce high blood pressure. · Check how lifestyle changes, such as weight loss and exercise, are affecting blood pressure. How can you prepare for the test? 
· Do not use caffeine, tobacco, or medicines known to raise blood pressure (such as nasal decongestant sprays) for at least 30 minutes before taking your blood pressure. · Do not exercise for at least 30 minutes before taking your blood pressure. What happens before the test? 
Take your blood pressure while you feel comfortable and relaxed. Sit quietly with both feet on the floor for at least 5 minutes before the test. 
What happens during the test? 
· Sit with your arm slightly bent and resting on a table so that your upper arm is at the same level as your heart. · Roll up your sleeve or take off your shirt to expose your upper arm. · Wrap the blood pressure cuff around your upper arm so that the lower edge of the cuff is about 1 inch above the bend of your elbow. Proceed with the following steps depending on if you are using an automatic or manual pressure monitor. Automatic blood pressure monitors · Press the on/off button on the automatic monitor and wait until the ready-to-measure \"heart\" symbol appears next to zero in the display window. · Press the start button. The cuff will inflate and deflate by itself. · Your blood pressure numbers will appear on the screen. · Write your numbers in your log book, along with the date and time. Manual blood pressure monitors · Place the earpieces of a stethoscope in your ears, and place the bell of the stethoscope over the artery, just below the cuff. · Close the valve on the rubber inflating bulb. · Squeeze the bulb rapidly with your opposite hand to inflate the cuff until the dial or column of mercury reads about 30 mm Hg higher than your usual systolic pressure. If you do not know your usual pressure, inflate the cuff to 210 mm Hg or until the pulse at your wrist disappears. · Open the pressure valve just slightly by twisting or pressing the valve on the bulb. · As you watch the pressure slowly fall, note the level on the dial at which you first start to hear a pulsing or tapping sound through the stethoscope. This is your systolic blood pressure. · Continue letting the air out slowly. The sounds will become muffled and will finally disappear. Note the pressure when the sounds completely disappear. This is your diastolic blood pressure. Let out all the remaining air. · Write your numbers in your log book, along with the date and time. What else should you know about the test? 
Results for adults ages 25 and older (mm Hg): · Normal (ideal): Systolic 265 or below. Diastolic 79 or below. · Prehypertension: Systolic 987 to 329. Diastolic 80 to 89. · Hypertension: Systolic 219 or above. Diastolic 90 or above. Follow-up care is a key part of your treatment and safety. Be sure to make and go to all appointments, and call your doctor if you are having problems. It's also a good idea to keep a list of the medicines you take. Where can you learn more? Go to http://bea-aurora.info/. Enter C427 in the search box to learn more about \"Home Blood Pressure Test: About This Test.\" Current as of: 2016 Content Version: 11.4 © 1565-2687 [employer]. Care instructions adapted under license by [employer] (which disclaims liability or warranty for this information). If you have questions about a medical condition or this instruction, always ask your healthcare professional. [address]  any warranty or liability for your use of this information. [address] Affiliated with [address] [place] [address]., [place], [address] [place] [address] 
[phone] Monitor blood pressure outside the office several times weekly at different times during the day and evening. Bring the record to me in 3 weeks for review. Blood Pressure Record Patient Name:  ______________________ :  ______________________ Date/Time BP Reading Pulse Introducing [facility]! Dear Veena Alexandre: 
Thank you for requesting a [facility] account. Our records indicate that you already have an active [facility] account. You can access your account anytime at [email or web address] Did you know that you can access your hospital and ER discharge instructions at any time in [facility]? You can also review all of your test results from your hospital stay or ER visit. Additional Information If you have questions, please visit the Frequently Asked Questions section of the [facility] website at [email or web address] Remember, [facility] is NOT to be used for urgent needs. For medical emergencies, dial 911. Now available from your iPhone and Android! Please provide this summary of care documentation to your next provider. Your primary care clinician is listed as [address]. If you have any questions after today's visit, please call [phone].

## 2017-12-21 NOTE — PATIENT INSTRUCTIONS
Please send a message or call if your blood pressure is still 150 or above on the top or 90 on the bottom after 2 weeks of taking the medication. Lisinopril (By mouth)   Lisinopril (lye-SIN-oh-pril)  Treats high blood pressure and heart failure. Also given to reduce the risk of death after a heart attack. This medicine is an ACE inhibitor. Brand Name(s): Prinivil, Qbrelis, Zestril   There may be other brand names for this medicine. When This Medicine Should Not Be Used: This medicine is not right for everyone. Do not use it if you had an allergic reaction to lisinopril or another ACE inhibitor, or if you are pregnant. How to Use This Medicine:   Liquid, Tablet  · Take your medicine as directed. Your dose may need to be changed several times to find what works best for you. · Oral liquid: Measure the oral liquid medicine with a marked measuring spoon, oral syringe, or medicine cup. · Missed dose: Take a dose as soon as you remember. If it is almost time for your next dose, wait until then and take a regular dose. Do not take extra medicine to make up for a missed dose. · Store the medicine in a closed container at room temperature, away from heat, moisture, and direct light. Drugs and Foods to Avoid:   Ask your doctor or pharmacist before using any other medicine, including over-the-counter medicines, vitamins, and herbal products. · Do not use this medicine together with aliskiren if you have diabetes. · Some foods and medicines may affect how lisinopril works.  Tell your doctor if you are using any of the following:   ¨ Aliskiren, everolimus, lithium, sirolimus, temsirolimus  ¨ Another blood pressure medicine, including an angiotensin receptor blocker (ARB)  ¨ Diuretic (water pill, including amiloride, spironolactone, triamterene)  ¨ Insulin or diabetes medicine  ¨ NSAID pain or arthritis medicine (including aspirin, celecoxib, diclofenac, ibuprofen, naproxen)  · Ask your doctor before you use any medicine, supplement, or salt substitute that contains potassium. Warnings While Using This Medicine:   · It is not safe to take this medicine during pregnancy. It could harm an unborn baby. Tell your doctor right away if you become pregnant. · Tell your doctor if you are breastfeeding, or if you have kidney disease, liver disease, diabetes, or heart or blood vessel disease. · This medicine may cause the following problems:  ¨ Angioedema (severe swelling)  ¨ Kidney problems  ¨ Serious liver problems  · This medicine could lower your blood pressure too much, especially when you first use it or if you are dehydrated. Stand or sit up slowly if you feel lightheaded or dizzy. · Do not stop using this medicine without asking your doctor, even if you feel well. This medicine will not cure your high blood pressure, but it will help keep it in a normal range. You may have to take blood pressure medicine for the rest of your life. · Tell any doctor or dentist who treats you that you are using this medicine. · Your doctor will do lab tests at regular visits to check on the effects of this medicine. Keep all appointments. · Keep all medicine out of the reach of children. Never share your medicine with anyone.   Possible Side Effects While Using This Medicine:   Call your doctor right away if you notice any of these side effects:  · Allergic reaction: Itching or hives, swelling in your face or hands, swelling or tingling in your mouth or throat, chest tightness, trouble breathing  · Blistering, peeling, or red skin rash  · Change in how much or how often you urinate  · Confusion, weakness, uneven heartbeat, trouble breathing, numbness or tingling in your hands, feet, or lips  · Dark urine or pale stools, nausea, vomiting, loss of appetite, stomach pain, yellow skin or eyes  · Fever, chills, sore throat, body aches  · Lightheadedness, dizziness, fainting  · Severe stomach pain (with or without nausea or vomiting)  If you notice these less serious side effects, talk with your doctor:   · Dry cough  If you notice other side effects that you think are caused by this medicine, tell your doctor. Call your doctor for medical advice about side effects. You may report side effects to FDA at 8-320-FDA-2789  © 2017 [address] Information is for End User's use only and may not be sold, redistributed or otherwise used for commercial purposes. The above information is an  only. It is not intended as medical advice for individual conditions or treatments. Talk to your doctor, nurse or pharmacist before following any medical regimen to see if it is safe and effective for you. Home Blood Pressure Test: About This Test  What is it? A home blood pressure test allows you to keep track of your blood pressure at home. Blood pressure is a measure of the force of blood against the walls of your arteries. Blood pressure readings include two numbers, such as 130/80 (say \"130 over 80\"). The first number is the systolic pressure. The second number is the diastolic pressure. Why is this test done? You may do this test at home to:  · Find out if you have high blood pressure. · Track your blood pressure if you have high blood pressure. · Track how well medicine is working to reduce high blood pressure. · Check how lifestyle changes, such as weight loss and exercise, are affecting blood pressure. How can you prepare for the test?  · Do not use caffeine, tobacco, or medicines known to raise blood pressure (such as nasal decongestant sprays) for at least 30 minutes before taking your blood pressure. · Do not exercise for at least 30 minutes before taking your blood pressure. What happens before the test?  Take your blood pressure while you feel comfortable and relaxed.  Sit quietly with both feet on the floor for at least 5 minutes before the test.  What happens during the test?  · Sit with your arm slightly bent and resting on a table so that your upper arm is at the same level as your heart. · Roll up your sleeve or take off your shirt to expose your upper arm. · Wrap the blood pressure cuff around your upper arm so that the lower edge of the cuff is about 1 inch above the bend of your elbow. Proceed with the following steps depending on if you are using an automatic or manual pressure monitor. Automatic blood pressure monitors  · Press the on/off button on the automatic monitor and wait until the ready-to-measure \"heart\" symbol appears next to zero in the display window. · Press the start button. The cuff will inflate and deflate by itself. · Your blood pressure numbers will appear on the screen. · Write your numbers in your log book, along with the date and time. Manual blood pressure monitors  · Place the earpieces of a stethoscope in your ears, and place the bell of the stethoscope over the artery, just below the cuff. · Close the valve on the rubber inflating bulb. · Squeeze the bulb rapidly with your opposite hand to inflate the cuff until the dial or column of mercury reads about 30 mm Hg higher than your usual systolic pressure. If you do not know your usual pressure, inflate the cuff to 210 mm Hg or until the pulse at your wrist disappears. · Open the pressure valve just slightly by twisting or pressing the valve on the bulb. · As you watch the pressure slowly fall, note the level on the dial at which you first start to hear a pulsing or tapping sound through the stethoscope. This is your systolic blood pressure. · Continue letting the air out slowly. The sounds will become muffled and will finally disappear. Note the pressure when the sounds completely disappear. This is your diastolic blood pressure. Let out all the remaining air. · Write your numbers in your log book, along with the date and time.   What else should you know about the test?  Results for adults ages 25 and older (mm Hg):  · Normal (ideal): Systolic 405 or below. Diastolic 79 or below. · Prehypertension: Systolic 371 to 807. Diastolic 80 to 89. · Hypertension: Systolic 307 or above. Diastolic 90 or above. Follow-up care is a key part of your treatment and safety. Be sure to make and go to all appointments, and call your doctor if you are having problems. It's also a good idea to keep a list of the medicines you take. Where can you learn more? Go to http://bea-aurora.info/. Enter C427 in the search box to learn more about \"Home Blood Pressure Test: About This Test.\"  Current as of: 2016  Content Version: 11.4  © 1064-7025 Healthwise, [employer]. Care instructions adapted under license by [employer] (which disclaims liability or warranty for this information). If you have questions about a medical condition or this instruction, always ask your healthcare professional. [address] any warranty or liability for your use of this information. Regan Freeman with [facility]  [address] [place] [address], [address]  [phone]    Monitor blood pressure outside the office several times weekly at different times during the day and evening. Bring the record to me in 3 weeks for review.     Blood Pressure Record     Patient Name:  ______________________ :  ______________________    Date/Time BP Reading Pulse

## 2017-12-21 NOTE — PROGRESS NOTES
CC: Elevated BP    HPI: Pt is a 60 y.o. female who presents for elevated BP. She reports home measurements up to 200's/100's. She thinks it is related to her back pain but has noticed that her BP is staying elevated for the past few weeks. Her daughter-in-law who is a nurse recommended that she start a medication to decrease her risk of stroke and she is amenable to this. Past Medical History:   Diagnosis Date    Colon polyps 3/23/2010    Hypercholesterolemia     Sleep apnea 3/23/2010       Family History   Problem Relation Age of Onset    Diabetes Mother     Hypertension Mother     Heart Disease Mother     Thyroid Disease Mother     Diabetes Sister     Heart Disease Sister     Hypertension Sister     Thyroid Disease Sister     Cancer Brother      brain       Social History   Substance Use Topics    Smoking status: Current Every Day Smoker     Packs/day: 0.50     Years: 25.00    Smokeless tobacco: Never Used    Alcohol use Yes      Comment: occasional       ROS:  Positive only when bolded  Constitutional: HA  Eyes: Changes in vision  Cardiovascular: CP, palpitations  Hematologic/lymphatic: YESENIA (chronic in RLE at site of prior injury)  Musculoskeletal: Myalgias, arthralgias (chronic, unchanged)  PE:  Visit Vitals    /68 (BP 1 Location: Left arm, BP Patient Position: Sitting)    Pulse 67    Temp 97.9 °F (36.6 °C) (Oral)    Resp 20    Ht 5' 1\" (1.549 m)    Wt 139 lb (63 kg)    LMP 01/01/2007    SpO2 96%    BMI 26.26 kg/m2     Gen: Pt sitting in chair, in NAD  Head: Normocephalic, atraumatic  Eyes: Sclera anicteric, EOM grossly intact, PERRL  Throat: MMM, normal lips, tongue and gums  Neck: Supple, no LAD, no thyromegaly or carotid bruits  CVS: Normal S1, S2, no m/r/g  Resp: CTAB, no wheezes or rales  Extrem: Atraumatic, no cyanosis or edema  Pulses: 2+   Skin: Warm, dry  Neuro: Alert, oriented, appropriate      A/P: Pt is a 60 y.o. female who presents for elevated BP.  Chart review shows that it has been elevated on several occasions (although also normal on several occasions). She is having persistently elevated BP's at home. Agree with decision to begin a medication, even if it is only for the short term until her pain can be better controlled (however this seems like more of a chronic issue). Discussed options with pt and agreed to start lisinopril. Discussed main side effects and monitoring parameters and gave handout with more information  - Lisinopril 10mg daily (pt given rx for 20mg tabs to cut in half to save her money and in case she ends up having to increase to 20mg daily in the future)  - Advised on smoking cessation and how that can help to lower BP  - RTC in 4 weeks for f/u BP and check BMP. She will call or send [employer] message before that if her BP is >150/90 on multiple occasions after 2 weeks on the lisinopril      The patient was counseled on the dangers of tobacco use, and was advised to quit. Reviewed strategies to maximize success, including removing cigarettes and smoking materials from environment and substitution of other forms of reinforcement. Discussed diagnoses in detail with patient. Medication risks/benefits/side effects discussed with patient. All of the patient's questions were addressed. The patient understands and agrees with our plan of care. The patient knows to call back if they are unsure of or forget any changes we discussed today or if the symptoms change. The patient received an After-Visit Summary which contains VS, orders, medication list and allergy list. This can be used as a \"mini-medical record\" should they have to seek medical care while out of town. Current Outpatient Prescriptions on File Prior to Visit   Medication Sig Dispense Refill    meloxicam (MOBIC) 7.5 mg tablet Take 1 Tab by mouth daily. Indications: OSTEOARTHRITIS 30 Tab 0    gabapentin (NEURONTIN) 300 mg capsule Take 2 Caps by mouth every evening.  Indications: NEUROPATHIC PAIN 60 Cap 4    ascorbic acid, vitamin C, (VITAMIN C) 500 mg tablet Take  by mouth.  potassium 99 mg tablet Take 99 mg by mouth daily.  DOCOSAHEXANOIC ACID/EPA (FISH OIL PO) Take  by mouth.  aspirin delayed-release (ASPIR-81) 81 mg tablet Take 81 mg by mouth daily.  promethazine (PHENERGAN) 25 mg tablet Take 1 Tab by mouth every six (6) hours as needed for Nausea. Indications: MOTION SICKNESS 40 Tab 0    MULTIVITAMIN (VITAMIN DAILY PO) Take  by mouth. No current facility-administered medications on file prior to visit.

## 2017-12-21 NOTE — PROGRESS NOTES
1. Have you been to the ER, urgent care clinic since your last visit? Hospitalized since your last visit? No    2. Have you seen or consulted any other health care providers outside of the [address] [place] [address] since your last visit? Include any pap smears or colon screening. No  Reviewed record in preparation for visit and have necessary documentation  Pt did not bring medication to office visit for review  Information was given to pt on Advanced Directives, Living Will  opportunity was given for questions  Goals that were addressed and/or need to be completed during or after this appointment include   There are no preventive care reminders to display for this patient.

## 2018-01-02 DIAGNOSIS — M54.31 RIGHT SCIATIC NERVE PAIN: ICD-10-CM

## 2018-01-02 RX ORDER — MELOXICAM 15 MG/1
15 TABLET ORAL DAILY
Qty: 30 TAB | Refills: 1 | Status: SHIPPED | OUTPATIENT
Start: 2018-01-02 | End: 2018-01-20 | Stop reason: SDUPTHER

## 2018-01-02 NOTE — TELEPHONE ENCOUNTER
Increase the dose of meloxicam as requested. She will need to see me in 2 months for renal function testing.

## 2018-05-17 DIAGNOSIS — M79.604 LEG PAIN, RIGHT: Chronic | ICD-10-CM

## 2018-05-17 DIAGNOSIS — M54.31 RIGHT SCIATIC NERVE PAIN: ICD-10-CM

## 2018-05-18 RX ORDER — GABAPENTIN 300 MG/1
600 CAPSULE ORAL
Qty: 60 CAP | Refills: 3 | Status: SHIPPED | OUTPATIENT
Start: 2018-05-18 | End: 2018-12-14 | Stop reason: SDUPTHER

## 2018-05-18 NOTE — TELEPHONE ENCOUNTER
From: Saima Franco  To: Paulino Sosa MD  Sent: 5/17/2018 9:07 PM EDT  Subject: Medication Renewal Request    Original authorizing provider: MD Nayla Flynn would like a refill of the following medications:  gabapentin (NEURONTIN) 300 mg capsule Paulino Sosa MD]    Preferred pharmacy: [address] [place] [address].  [address]    Comment:

## 2018-08-29 ENCOUNTER — TELEPHONE (OUTPATIENT)
Dept: FAMILY MEDICINE CLINIC | Age: 63
End: 2018-08-29

## 2018-08-29 DIAGNOSIS — J01.00 ACUTE NON-RECURRENT MAXILLARY SINUSITIS: Primary | ICD-10-CM

## 2018-08-29 RX ORDER — CEFPROZIL 500 MG/1
500 TABLET, FILM COATED ORAL 2 TIMES DAILY
Qty: 20 TAB | Refills: 0 | Status: SHIPPED | OUTPATIENT
Start: 2018-08-29 | End: 2019-07-15 | Stop reason: ALTCHOICE

## 2018-08-29 NOTE — TELEPHONE ENCOUNTER
Patient walked into the office stating that she has a sinus infection. She reports that she has been coughing up a lot of yellow phlegm and has a sinus pressure headache. She has an appointment to see you Friday but would like to know if something could be called in to [facility] before then. Please advise, thank you.

## 2018-08-29 NOTE — TELEPHONE ENCOUNTER
Spoke with patient and advised her that an abx has been sent in to [facility] and to keep her appointment with Dr. Alejandra Manrique for Friday. Patient verbalized understanding.

## 2018-08-29 NOTE — TELEPHONE ENCOUNTER
----- Message from Shanae Pfeiffer sent at 8/29/2018  9:49 AM EDT -----  Regarding: Dr. Crystal Bautista returned a call. Best contact [phone].

## 2018-08-31 ENCOUNTER — OFFICE VISIT (OUTPATIENT)
Dept: FAMILY MEDICINE CLINIC | Age: 63
End: 2018-08-31

## 2018-08-31 VITALS
HEIGHT: 61 IN | HEART RATE: 74 BPM | WEIGHT: 139 LBS | RESPIRATION RATE: 20 BRPM | OXYGEN SATURATION: 97 % | TEMPERATURE: 98.8 F | DIASTOLIC BLOOD PRESSURE: 66 MMHG | SYSTOLIC BLOOD PRESSURE: 124 MMHG | BODY MASS INDEX: 26.24 KG/M2

## 2018-08-31 DIAGNOSIS — M85.80 OSTEOPENIA, UNSPECIFIED LOCATION: ICD-10-CM

## 2018-08-31 DIAGNOSIS — J20.9 ACUTE BRONCHITIS, UNSPECIFIED ORGANISM: ICD-10-CM

## 2018-08-31 DIAGNOSIS — E78.2 MIXED HYPERLIPIDEMIA: Chronic | ICD-10-CM

## 2018-08-31 DIAGNOSIS — J01.01 ACUTE RECURRENT MAXILLARY SINUSITIS: Primary | ICD-10-CM

## 2018-08-31 NOTE — MR AVS SNAPSHOT
[address] [place] [address] 
[phone] Patient: Irwin Garcia MRN: [id number] ANJ:[id number] Visit Information Date & Time Provider Department Dept. Phone Encounter #  
 8/31/2018  2:25 PM Alma Pollack, Kailyn Marin [place] [id number] Upcoming Health Maintenance Date Due  
 BREAST CANCER SCRN MAMMOGRAM 6/14/2012 Influenza Age 5 to Adult 8/1/2018 COLONOSCOPY 7/27/2019 PAP AKA CERVICAL CYTOLOGY 8/25/2022 DTaP/Tdap/Td series (2 - Td) 12/10/2024 Allergies as of 8/31/2018  Review Complete On: 8/31/2018 By: Josué Ellis LPN Severity Noted Reaction Type Reactions Percocet [Oxycodone-acetaminophen]  03/23/2010    Nausea and Vomiting Current Immunizations  Reviewed on 12/15/2017 Name Date Influenza Vaccine 9/29/2017, 10/27/2015, 12/24/2012 Influenza Vaccine (Quad) PF 10/11/2016 Influenza Vaccine Split 10/20/2010, 9/10/2009 PPD 12/12/2011 Pneumococcal Conjugate (PCV-13) 12/29/2015 Pneumococcal Polysaccharide (PPSV-23) 3/25/2014 TD Vaccine 3/22/2000 Tdap 12/10/2014 12:14 PM  
 ZZZ-RETIRED (DO NOT USE) Pneumococcal Vaccine (Unspecified Type) 1/19/2005 Zoster Vaccine, Live 1/19/2016 Not reviewed this visit You Were Diagnosed With   
  
 Codes Comments Acute recurrent maxillary sinusitis    -  Primary ICD-10-CM: J01.01 
ICD-9-CM: 461.0 Mixed hyperlipidemia     ICD-10-CM: E78.2 ICD-9-CM: 272.2 Acute bronchitis, unspecified organism     ICD-10-CM: J20.9 ICD-9-CM: 466.0 Osteopenia, unspecified location     ICD-10-CM: M85.80 ICD-9-CM: 733.90 Vitals BP Pulse Temp Resp Height(growth percentile) Weight(growth percentile) 126/72 (BP 1 Location: Right arm, BP Patient Position: Sitting) 74 98.8 °F (37.1 °C) (Oral) 20 5' 1\" (1.549 m) 139 lb (63 kg) LMP SpO2 BMI OB Status Smoking Status 01/01/2007 97% 26.26 kg/m2 Postmenopausal Current Every Day Smoker Vitals History BMI and BSA Data Body Mass Index Body Surface Area  
 26.26 kg/m 2 1.65 m 2 Preferred Pharmacy Pharmacy Name Phone [address] [place], [place] - [address] [phone] Your Updated Medication List  
  
   
This list is accurate as of 8/31/18  2:52 PM.  Always use your most recent med list.  
  
  
  
  
 ASPIR-81 81 mg tablet Generic drug:  aspirin delayed-release Take 81 mg by mouth daily. cefPROZIL 500 mg tablet Commonly known as:  CEFZIL Take 1 Tab by mouth two (2) times a day. Indications: sinusitis FISH OIL PO Take  by mouth.  
  
 gabapentin 300 mg capsule Commonly known as:  NEURONTIN Take 2 Caps by mouth nightly. Indications: NEUROPATHIC PAIN  
  
 lisinopril 20 mg tablet Commonly known as:  PRINIVIL, ZESTRIL  
TAKE 1/2 TABLET BY MOUTH EVERY DAY  
  
 meloxicam 15 mg tablet Commonly known as:  MOBIC  
TAKE ONE TABLET BY MOUTH EVERY DAY  
  
 potassium 99 mg tablet Take 99 mg by mouth daily. VITAMIN C 500 mg tablet Generic drug:  ascorbic acid (vitamin C) Take  by mouth. We Performed the Following LIPID PANEL [00102 CPT(R)] METABOLIC PANEL, COMPREHENSIVE [91572 CPT(R)] Patient Instructions Bronchitis: Care Instructions Your Care Instructions Bronchitis is inflammation of the bronchial tubes, which carry air to the lungs. The tubes swell and produce mucus, or phlegm. The mucus and inflamed bronchial tubes make you cough. You may have trouble breathing. Most cases of bronchitis are caused by viruses like those that cause colds. Antibiotics usually do not help and they may be harmful. Bronchitis usually develops rapidly and lasts about 2 to 3 weeks in otherwise healthy people. Follow-up care is a key part of your treatment and safety.  Be sure to make and go to all appointments, and call your doctor if you are having problems. It's also a good idea to know your test results and keep a list of the medicines you take. How can you care for yourself at home? · Take all medicines exactly as prescribed. Call your doctor if you think you are having a problem with your medicine. · Get some extra rest. 
· Take an over-the-counter pain medicine, such as acetaminophen (Tylenol), ibuprofen (Advil, Motrin), or naproxen (Aleve) to reduce fever and relieve body aches. Read and follow all instructions on the label. · Do not take two or more pain medicines at the same time unless the doctor told you to. Many pain medicines have acetaminophen, which is Tylenol. Too much acetaminophen (Tylenol) can be harmful. · Take an over-the-counter cough medicine that contains dextromethorphan to help quiet a dry, hacking cough so that you can sleep. Avoid cough medicines that have more than one active ingredient. Read and follow all instructions on the label. · Breathe moist air from a humidifier, hot shower, or sink filled with hot water. The heat and moisture will thin mucus so you can cough it out. · Do not smoke. Smoking can make bronchitis worse. If you need help quitting, talk to your doctor about stop-smoking programs and medicines. These can increase your chances of quitting for good. When should you call for help? Call 911 anytime you think you may need emergency care. For example, call if: 
  · You have severe trouble breathing.  
 Call your doctor now or seek immediate medical care if: 
  · You have new or worse trouble breathing.  
  · You cough up dark brown or bloody mucus (sputum).  
  · You have a new or higher fever.  
  · You have a new rash.  
 Watch closely for changes in your health, and be sure to contact your doctor if: 
  · You cough more deeply or more often, especially if you notice more mucus or a change in the color of your mucus.   · You are not getting better as expected. Where can you learn more? Go to http://bea-aurora.info/. Enter H333 in the search box to learn more about \"Bronchitis: Care Instructions. \" Current as of: December 6, 2017 Content Version: 11.7 © 2799-3172 [employer]. Care instructions adapted under license by [employer] (which disclaims liability or warranty for this information). If you have questions about a medical condition or this instruction, always ask your healthcare professional. [address] any warranty or liability for your use of this information. Introducing [facility]! Dear Zehra Dejesus: 
Thank you for requesting a Ticies account. Our records indicate that you already have an active Ticies account. You can access your account anytime at [email or web address] Did you know that you can access your hospital and ER discharge instructions at any time in Ticies? You can also review all of your test results from your hospital stay or ER visit. Additional Information If you have questions, please visit the Frequently Asked Questions section of the Ticies website at [email or web address] Remember, Ticies is NOT to be used for urgent needs. For medical emergencies, dial 911. Now available from your iPhone and Android! Please provide this summary of care documentation to your next provider. Your primary care clinician is listed as [address]. If you have any questions after today's visit, please call [phone].
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
## 2018-08-31 NOTE — PATIENT INSTRUCTIONS
Bronchitis: Care Instructions Your Care Instructions Bronchitis is inflammation of the bronchial tubes, which carry air to the lungs. The tubes swell and produce mucus, or phlegm. The mucus and inflamed bronchial tubes make you cough. You may have trouble breathing. Most cases of bronchitis are caused by viruses like those that cause colds. Antibiotics usually do not help and they may be harmful. Bronchitis usually develops rapidly and lasts about 2 to 3 weeks in otherwise healthy people. Follow-up care is a key part of your treatment and safety. Be sure to make and go to all appointments, and call your doctor if you are having problems. It's also a good idea to know your test results and keep a list of the medicines you take. How can you care for yourself at home? · Take all medicines exactly as prescribed. Call your doctor if you think you are having a problem with your medicine. · Get some extra rest. 
· Take an over-the-counter pain medicine, such as acetaminophen (Tylenol), ibuprofen (Advil, Motrin), or naproxen (Aleve) to reduce fever and relieve body aches. Read and follow all instructions on the label. · Do not take two or more pain medicines at the same time unless the doctor told you to. Many pain medicines have acetaminophen, which is Tylenol. Too much acetaminophen (Tylenol) can be harmful. · Take an over-the-counter cough medicine that contains dextromethorphan to help quiet a dry, hacking cough so that you can sleep. Avoid cough medicines that have more than one active ingredient. Read and follow all instructions on the label. · Breathe moist air from a humidifier, hot shower, or sink filled with hot water. The heat and moisture will thin mucus so you can cough it out. · Do not smoke. Smoking can make bronchitis worse. If you need help quitting, talk to your doctor about stop-smoking programs and medicines. These can increase your chances of quitting for good. When should you call for help? Call 911 anytime you think you may need emergency care. For example, call if: 
  · You have severe trouble breathing.  
 Call your doctor now or seek immediate medical care if: 
  · You have new or worse trouble breathing.  
  · You cough up dark brown or bloody mucus (sputum).  
  · You have a new or higher fever.  
  · You have a new rash.  
 Watch closely for changes in your health, and be sure to contact your doctor if: 
  · You cough more deeply or more often, especially if you notice more mucus or a change in the color of your mucus.  
  · You are not getting better as expected. Where can you learn more? Go to http://bea-aurora.info/. Enter H333 in the search box to learn more about \"Bronchitis: Care Instructions. \" Current as of: December 6, 2017 Content Version: 11.7 © 6516-8467 [employer]. Care instructions adapted under license by [employer] (which disclaims liability or warranty for this information). If you have questions about a medical condition or this instruction, always ask your healthcare professional. [address] any warranty or liability for your use of this information.

## 2018-08-31 NOTE — PROGRESS NOTES
1. Have you been to the ER, urgent care clinic since your last visit? Hospitalized since your last visit? No 
 
2. Have you seen or consulted any other health care providers outside of the [address] [place] [address] since your last visit? Include any pap smears or colon screening. No 
Reviewed record in preparation for visit and have necessary documentation Pt did not bring medication to office visit for review Goals that were addressed and/or need to be completed during or after this appointment include Health Maintenance Due Topic Date Due  
 BREAST CANCER SCRN MAMMOGRAM  06/14/2012  Influenza Age 5 to Adult  08/01/2018

## 2018-09-20 ENCOUNTER — CLINICAL SUPPORT (OUTPATIENT)
Dept: FAMILY MEDICINE CLINIC | Age: 63
End: 2018-09-20

## 2018-09-20 VITALS — TEMPERATURE: 98.8 F

## 2018-09-20 DIAGNOSIS — Z23 ENCOUNTER FOR IMMUNIZATION: Primary | ICD-10-CM

## 2018-09-20 NOTE — PROGRESS NOTES
Chief Complaint   Patient presents with    Immunization/Injection       Influenza injection ordered by ZULEMA Aragon given 9/20/2018 by Darren Kinney LPN as follows:    Dose amount:  0.5 ml  Injection site:  Left Deltoid  Route:  IM      Patient tolerated injection well.

## 2019-01-16 ENCOUNTER — TELEPHONE (OUTPATIENT)
Dept: FAMILY MEDICINE CLINIC | Age: 64
End: 2019-01-16

## 2019-01-16 NOTE — TELEPHONE ENCOUNTER
Patient called stating that she has a runny nose and a cough. She has been taking [place] over the counter. She would like to know if you can send something in for her or if you have any other suggestions for her to take.

## 2019-01-16 NOTE — TELEPHONE ENCOUNTER
Spoke with patient and informed her of the following: \"For viral URI's use Mucinex DM, increase fluids, Tylenol for pain or low-grade fever, vitamin C 500 mg chewable daily and call us if she gets worse.  Some of these viruses are so virulent they are not causing bronchitis and pneumonia.  Watch for change in sputum coloration, wheezing or worsening cough. \"     Patient verbalized understanding.

## 2019-04-17 ENCOUNTER — TELEPHONE (OUTPATIENT)
Dept: FAMILY MEDICINE CLINIC | Age: 64
End: 2019-04-17

## 2019-04-17 DIAGNOSIS — M54.31 RIGHT SCIATIC NERVE PAIN: ICD-10-CM

## 2019-04-17 DIAGNOSIS — M79.604 LEG PAIN, RIGHT: Chronic | ICD-10-CM

## 2019-04-17 NOTE — TELEPHONE ENCOUNTER
She is supposed to be seen every 3 months if she is getting gabapentin. Please have her schedule an appointment.

## 2019-04-18 RX ORDER — GABAPENTIN 300 MG/1
600 CAPSULE ORAL
Qty: 30 CAP | Refills: 0 | Status: SHIPPED | OUTPATIENT
Start: 2019-04-18 | End: 2019-05-16 | Stop reason: SDUPTHER

## 2019-05-02 ENCOUNTER — OFFICE VISIT (OUTPATIENT)
Dept: FAMILY MEDICINE CLINIC | Age: 64
End: 2019-05-02

## 2019-05-02 VITALS
BODY MASS INDEX: 24.92 KG/M2 | TEMPERATURE: 99.5 F | RESPIRATION RATE: 20 BRPM | SYSTOLIC BLOOD PRESSURE: 106 MMHG | OXYGEN SATURATION: 94 % | HEIGHT: 61 IN | HEART RATE: 73 BPM | DIASTOLIC BLOOD PRESSURE: 64 MMHG | WEIGHT: 132 LBS

## 2019-05-02 DIAGNOSIS — G89.29 CHRONIC PAIN OF LEFT KNEE: Primary | ICD-10-CM

## 2019-05-02 DIAGNOSIS — M17.32 POST-TRAUMATIC OSTEOARTHRITIS OF LEFT KNEE: Chronic | ICD-10-CM

## 2019-05-02 DIAGNOSIS — M25.562 CHRONIC PAIN OF LEFT KNEE: Primary | ICD-10-CM

## 2019-05-02 RX ORDER — DICLOFENAC SODIUM 50 MG/1
50 TABLET, DELAYED RELEASE ORAL 2 TIMES DAILY
Qty: 28 TAB | Refills: 0 | Status: SHIPPED | OUTPATIENT
Start: 2019-05-02 | End: 2019-05-16 | Stop reason: SDUPTHER

## 2019-05-02 NOTE — PROGRESS NOTES
I discussed the findings, assessment and plan in detail with the resident and agree with the resident's findings and plan as documented in the resident's note. Needs to see ortho after looking at knee XR. Roseline Horner M.D.

## 2019-05-02 NOTE — PROGRESS NOTES
1. Have you been to the ER, urgent care clinic since your last visit? Hospitalized since your last visit? No 
 
2. Have you seen or consulted any other health care providers outside of the [address] [place] [address] since your last visit? Include any pap smears or colon screening. No 
Reviewed record in preparation for visit and have necessary documentation Pt did not bring medication to office visit for review Goals that were addressed and/or need to be completed during or after this appointment include Health Maintenance Due Topic Date Due  Shingrix Vaccine Age 50> (1 of 2) 04/13/2004  BREAST CANCER SCRN MAMMOGRAM  06/14/2012  GLAUCOMA SCREENING Q2Y  04/13/2019  Bone Densitometry (Dexa) Screening  04/13/2019  Pneumococcal 65+ years (2 of 2 - PPSV23) 04/13/2019  COLONOSCOPY  07/27/2019

## 2019-05-02 NOTE — PATIENT INSTRUCTIONS
Osteoarthritis: Care Instructions Your Care Instructions Arthritis is a common health problem in which the joints are inflamed. There are several kinds of arthritis. Osteoarthritis is caused by a breakdown of cartilage, the hard, thick tissue that cushions the joints. It causes pain, stiffness, and swelling, often in the spine, fingers, hips, and knees. Osteoarthritis can happen at any age, but it is most common in older people. Osteoarthritis never goes away completely, but it can be controlled. Medicine and home treatment can reduce the pain and prevent the arthritis from getting worse. Follow-up care is a key part of your treatment and safety. Be sure to make and go to all appointments, and call your doctor if you are having problems. It's also a good idea to know your test results and keep a list of the medicines you take. How can you care for yourself at home? · Take a warm shower or bath in the morning to relieve stiffness. Avoid sitting still afterwards. · If the joint is not swollen, use moist heat, like a warm, damp towel, for 20 to 30 minutes, 2 or 3 times a day. Do not use heat on a swollen joint. · If the joint is swollen, use ice or cold packs for 10 to 20 minutes, once an hour. Cold will help relieve pain and reduce inflammation. Put a thin cloth between the ice and your skin. · To prevent stiffness, gently move the joint through its full range of motion several times a day. · If the joint hurts, avoid activities that put a strain on it for a few days. Take rest breaks throughout the day. · Get regular exercise. Walking, swimming, yoga, biking, leon chi, and water aerobics are good exercises that are gentle on the joints. · Reach and stay at a healthy weight. If you need to lose or maintain weight, regular exercise and a healthy diet will help. Extra weight can strain the joints, especially the knees and hips, and make the pain worse. Losing even a few pounds may help. · Take pain medicines exactly as directed. ? If the doctor gave you a prescription medicine for pain, take it as prescribed. ? If you are not taking a prescription pain medicine, ask your doctor if you can take an over-the-counter medicine. When should you call for help? Call your doctor now or seek immediate medical care if: 
  · The pain is so bad that you cannot use the joint.  
  · You have sudden back pain with weakness in your legs or loss of bowel or bladder control.  
  · Your stools are black and tarlike or have streaks of blood.  
  · You have severe pain and swelling in more than one joint.  
 Watch closely for changes in your health, and be sure to contact your doctor if: 
  · You have side effects from the medicines, like belly pain, ongoing heartburn, or nausea.  
  · Joint pain continues for more than 6 weeks, and home treatment is not helping. Where can you learn more? Go to http://bea-aurora.info/. Enter K688 in the search box to learn more about \"Osteoarthritis: Care Instructions. \" Current as of: Jina 10, 2018 Content Version: 11.9 © 6663-0966 [employer]. Care instructions adapted under license by [employer] (which disclaims liability or warranty for this information). If you have questions about a medical condition or this instruction, always ask your healthcare professional. [address] any warranty or liability for your use of this information.
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
## 2019-05-16 ENCOUNTER — OFFICE VISIT (OUTPATIENT)
Dept: FAMILY MEDICINE CLINIC | Age: 64
End: 2019-05-16

## 2019-05-16 VITALS
DIASTOLIC BLOOD PRESSURE: 63 MMHG | WEIGHT: 133 LBS | RESPIRATION RATE: 16 BRPM | OXYGEN SATURATION: 96 % | HEART RATE: 74 BPM | BODY MASS INDEX: 25.11 KG/M2 | TEMPERATURE: 98.1 F | HEIGHT: 61 IN | SYSTOLIC BLOOD PRESSURE: 110 MMHG

## 2019-05-16 DIAGNOSIS — E78.2 MIXED HYPERLIPIDEMIA: Chronic | ICD-10-CM

## 2019-05-16 DIAGNOSIS — G89.29 CHRONIC PAIN OF LEFT KNEE: ICD-10-CM

## 2019-05-16 DIAGNOSIS — M25.562 CHRONIC PAIN OF LEFT KNEE: ICD-10-CM

## 2019-05-16 DIAGNOSIS — Z13.820 OSTEOPOROSIS SCREENING: Primary | ICD-10-CM

## 2019-05-16 DIAGNOSIS — M85.852 OTHER SPECIFIED DISORDERS OF BONE DENSITY AND STRUCTURE, LEFT THIGH: ICD-10-CM

## 2019-05-16 DIAGNOSIS — M17.32 POST-TRAUMATIC OSTEOARTHRITIS OF LEFT KNEE: Chronic | ICD-10-CM

## 2019-05-16 DIAGNOSIS — M54.31 RIGHT SCIATIC NERVE PAIN: ICD-10-CM

## 2019-05-16 DIAGNOSIS — M79.604 LEG PAIN, RIGHT: Chronic | ICD-10-CM

## 2019-05-16 DIAGNOSIS — Z12.39 BREAST CANCER SCREENING: ICD-10-CM

## 2019-05-16 DIAGNOSIS — I10 ESSENTIAL HYPERTENSION: ICD-10-CM

## 2019-05-16 RX ORDER — LISINOPRIL 20 MG/1
20 TABLET ORAL DAILY
Qty: 90 TAB | Refills: 3 | Status: SHIPPED | OUTPATIENT
Start: 2019-05-16 | End: 2019-06-18 | Stop reason: SDUPTHER

## 2019-05-16 RX ORDER — DICLOFENAC SODIUM 50 MG/1
50 TABLET, DELAYED RELEASE ORAL
Qty: 28 TAB | Refills: 0 | Status: SHIPPED | OUTPATIENT
Start: 2019-05-16 | End: 2019-08-27

## 2019-05-16 RX ORDER — GABAPENTIN 300 MG/1
600 CAPSULE ORAL
Qty: 90 CAP | Refills: 3 | Status: SHIPPED | OUTPATIENT
Start: 2019-05-16 | End: 2019-11-27 | Stop reason: SDUPTHER

## 2019-05-16 NOTE — PROGRESS NOTES
I discussed the findings, assessment and plan in detail with the resident and agree with the resident's findings and plan as documented in the resident's note. Raudel Carney M.D.

## 2019-05-16 NOTE — PATIENT INSTRUCTIONS
Arthritis: Care Instructions  Your Care Instructions  Arthritis, also called osteoarthritis, is a breakdown of the cartilage that cushions your joints. When the cartilage wears down, your bones rub against each other. This causes pain and stiffness. Many people have some arthritis as they age. Arthritis most often affects the joints of the spine, hands, hips, knees, or feet. You can take simple measures to protect your joints, ease your pain, and help you stay active. Follow-up care is a key part of your treatment and safety. Be sure to make and go to all appointments, and call your doctor if you are having problems. It's also a good idea to know your test results and keep a list of the medicines you take. How can you care for yourself at home? · Stay at a healthy weight. Being overweight puts extra strain on your joints. · Talk to your doctor or physical therapist about exercises that will help ease joint pain. ? Stretch. You may enjoy gentle forms of yoga to help keep your joints and muscles flexible. ? Walk instead of jog. Other types of exercise that are less stressful on the joints include riding a bicycle, swimming, leon chi, or water exercise. ? Lift weights. Strong muscles help reduce stress on your joints. Stronger thigh muscles, for example, take some of the stress off of the knees and hips. Learn the right way to lift weights so you do not make joint pain worse. · Take your medicines exactly as prescribed. Call your doctor if you think you are having a problem with your medicine. · Take pain medicines exactly as directed. ? If the doctor gave you a prescription medicine for pain, take it as prescribed. ? If you are not taking a prescription pain medicine, ask your doctor if you can take an over-the-counter medicine. · Use a cane, crutch, walker, or another device if you need help to get around. These can help rest your joints.  You also can use other things to make life easier, such as a higher toilet seat and padded handles on kitchen utensils. · Do not sit in low chairs, which can make it hard to get up. · Put heat or cold on your sore joints as needed. Use whichever helps you most. You also can take turns with hot and cold packs. ? Apply heat 2 or 3 times a day for 20 to 30 minutes--using a heating pad, hot shower, or hot pack--to relieve pain and stiffness. ? Put ice or a cold pack on your sore joint for 10 to 20 minutes at a time. Put a thin cloth between the ice and your skin. When should you call for help? Call your doctor now or seek immediate medical care if:    · You have sudden swelling, warmth, or pain in any joint.     · You have joint pain and a fever or rash.     · You have such bad pain that you cannot use a joint.    Watch closely for changes in your health, and be sure to contact your doctor if:    · You have mild joint symptoms that continue even with more than 6 weeks of care at home.     · You have stomach pain or other problems with your medicine. Where can you learn more? Go to http://bea-aurora.info/. Enter I004 in the search box to learn more about \"Arthritis: Care Instructions. \"  Current as of: Jina 10, 2018  Content Version: 11.9  © 4911-7901 [employer]. Care instructions adapted under license by [employer] (which disclaims liability or warranty for this information). If you have questions about a medical condition or this instruction, always ask your healthcare professional. [place] [address] any warranty or liability for your use of this information.

## 2019-05-16 NOTE — PROGRESS NOTES
1. Have you been to the ER, urgent care clinic since your last visit? Hospitalized since your last visit? No    2. Have you seen or consulted any other health care providers outside of the [address] [place] [address] since your last visit? Include any pap smears or colon screening.  No  Reviewed record in preparation for visit and have necessary documentation  Pt did not bring medication to office visit for review    Goals that were addressed and/or need to be completed during or after this appointment include   Health Maintenance Due   Topic Date Due    Shingrix Vaccine Age 49> (1 of 2) 04/13/2004    BREAST CANCER SCRN MAMMOGRAM  06/14/2012    GLAUCOMA SCREENING Q2Y  04/13/2019    Bone Densitometry (Dexa) Screening  04/13/2019    Pneumococcal 65+ years (2 of 2 - PPSV23) 04/13/2019    COLONOSCOPY  07/27/2019

## 2019-05-16 NOTE — PROGRESS NOTES
[facility]     Outpatient Resident Progress Note    Encounter Date: 5/16/2019    Chief Complaint   Patient presents with    Hypertension    Labs     Routine per pt's request       History of Present Illness    Patient is a 71 y.o. female, who presents to clinic for Hypertension and Labs (Routine per pt's request)  Patient reports home  to 120 and DBP 60 to 75. Reports being compliant with medication and have no SE. Denies sweats, fever, fatigue, dizziness, lightheadedness, headaches, changes in vision, CP, SOB, abdominal pain or tenderness, nausea, vomiting, dysuria, hematuria, diarrhea, melena, hematochezia, leg swelling, or any other complains at this moment. Diclofenac have worked well for the left knee pain secondary to OA. She is going to be evaluated by Ortho on 5/22/2019. No Steroid injection have been administered to allow room for possible Ortho procedures. Review of Systems    A complete ROS was reviewed and only pertinent items documented on HPI. Allergies - reviewed: Allergies   Allergen Reactions    Percocet [Oxycodone-Acetaminophen] Nausea and Vomiting       Medications - reviewed:   Current Outpatient Medications   Medication Sig    diclofenac EC (VOLTAREN) 50 mg EC tablet Take 1 Tab by mouth two (2) times a day.  gabapentin (NEURONTIN) 300 mg capsule Take 2 Caps by mouth nightly.  lisinopril (PRINIVIL, ZESTRIL) 20 mg tablet TAKE 1/2 TABLET BY MOUTH EVERY DAY    cefPROZIL (CEFZIL) 500 mg tablet Take 1 Tab by mouth two (2) times a day. Indications: sinusitis    meloxicam (MOBIC) 15 mg tablet TAKE ONE TABLET BY MOUTH EVERY DAY    ascorbic acid, vitamin C, (VITAMIN C) 500 mg tablet Take  by mouth.  potassium 99 mg tablet Take 99 mg by mouth daily.  DOCOSAHEXANOIC ACID/EPA (FISH OIL PO) Take  by mouth.  aspirin delayed-release (ASPIR-81) 81 mg tablet Take 81 mg by mouth daily.      No current facility-administered medications for this visit. Past Medical History - reviewed:  Past Medical History:   Diagnosis Date    Colon polyps 3/23/2010    Hypercholesterolemia     Sleep apnea 3/23/2010       Family Medical History - reviewed:  Family History   Problem Relation Age of Onset    Diabetes Mother     Hypertension Mother     Heart Disease Mother     Thyroid Disease Mother     Diabetes Sister     Heart Disease Sister     Hypertension Sister     Thyroid Disease Sister     Cancer Brother         brain       Objective  Visit Vitals  /63 (BP 1 Location: Left arm, BP Patient Position: Sitting)   Pulse 74   Temp 98.1 °F (36.7 °C) (Oral)   Resp 16   Ht 5' 1\" (1.549 m)   Wt 133 lb (60.3 kg)   LMP 01/01/2007   SpO2 96%   BMI 25.13 kg/m²     Body mass index is 25.13 kg/m². Nursing note and vitals reviewed. Physical Exam  Constitutional: Well-developed, well-nourished, and in no distress. Cardiovascular: Normal rate, regular rhythm, normal heart sounds and intact distal pulses. Exam reveals no gallop and no friction rub. No murmur heard. Pulmonary/Chest: Effort normal and breath sounds normal. No respiratory distress. No wheezes, no rales, no tenderness. Abdominal: Soft. Bowel sounds are normal. No distension and no mass. There is no tenderness. There is no rebound and no guarding. Musculoskeletal: Lt knee patellar grinding, with tenderness over the medial aspect of the knee. Mild effusion of the superior aspect of the knee. There is no instability of the knee. Neurological: Alert and oriented to person, place, and time. Normal reflexes. No cranial nerve deficit. Gait normal. Coordination normal.   Skin: Skin is warm and dry. No rash noted. Not diaphoretic. No erythema. No pallor. Psychiatric: Mood, memory, affect and judgment normal.     Assessment / Plan   Ms. Keane is a 71 y.o. female with the following medical condition(s):    1. Essential hypertension  Stable.  Continue current management. - lisinopril (PRINIVIL, ZESTRIL) 20 mg tablet; Take 1 Tab by mouth daily. Dispense: 90 Tab; Refill: 3    2. Right sciatic nerve pain  - gabapentin (NEURONTIN) 300 mg capsule; Take 2 Caps by mouth nightly. Dispense: 90 Cap; Refill: 3    3. Chronic pain of left knee due to Post-traumatic osteoarthritis of left knee  Will follow up Ortho evaluation. - diclofenac EC (VOLTAREN) 50 mg EC tablet; Take 1 Tab by mouth two (2) times daily as needed for Pain. Dispense: 28 Tab; Refill: 0    4. Osteoporosis screening  - DEXA BONE DENSITY STUDY AXIAL; Future    5. Breast cancer screening  - [facility] MAMMO BI SCREENING INCL CAD; Future    6. Mixed hyperlipidemia  Will recheck labs  - METABOLIC PANEL, COMPREHENSIVE  - LIPID PANEL    7. Other specified disorders of bone density and structure, left thigh   - DEXA BONE DENSITY STUDY AXIAL; Future    Patient was advised to return to clinic in case of worsening of symptoms or fail to improve. Life threatening signs and symptoms were discussed and patient advised to go to the nearest ED for prompt evaluation and care in case of onset of any of these. · I have discussed the diagnosis with the patient and the intended plan as seen in the above orders. The patient has received an after-visit summary and questions were answered concerning future plans. I have discussed medication side effects and warnings with the patient as well.       Patient/Plan discussed with Dr. Jay Donald (Attending Physician)      Jono Ivy MD  PGY-3 Family Medicine Resident  Encounter Date: 5/16/2019

## 2019-05-17 LAB
ALBUMIN SERPL-MCNC: 4.4 G/DL (ref 3.6–4.8)
ALBUMIN/GLOB SERPL: 2 {RATIO} (ref 1.2–2.2)
ALP SERPL-CCNC: 78 IU/L (ref 39–117)
ALT SERPL-CCNC: 19 IU/L (ref 0–32)
AST SERPL-CCNC: 20 IU/L (ref 0–40)
BILIRUB SERPL-MCNC: 0.3 MG/DL (ref 0–1.2)
BUN SERPL-MCNC: 20 MG/DL (ref 8–27)
BUN/CREAT SERPL: 20 (ref 12–28)
CALCIUM SERPL-MCNC: 9.7 MG/DL (ref 8.7–10.3)
CHLORIDE SERPL-SCNC: 101 MMOL/L (ref 96–106)
CHOLEST SERPL-MCNC: 224 MG/DL (ref 100–199)
CO2 SERPL-SCNC: 25 MMOL/L (ref 20–29)
CREAT SERPL-MCNC: 1.02 MG/DL (ref 0.57–1)
GLOBULIN SER CALC-MCNC: 2.2 G/DL (ref 1.5–4.5)
GLUCOSE SERPL-MCNC: 104 MG/DL (ref 65–99)
HDLC SERPL-MCNC: 66 MG/DL
LDLC SERPL CALC-MCNC: 138 MG/DL (ref 0–99)
POTASSIUM SERPL-SCNC: 4.3 MMOL/L (ref 3.5–5.2)
PROT SERPL-MCNC: 6.6 G/DL (ref 6–8.5)
SODIUM SERPL-SCNC: 141 MMOL/L (ref 134–144)
TRIGL SERPL-MCNC: 102 MG/DL (ref 0–149)
VLDLC SERPL CALC-MCNC: 20 MG/DL (ref 5–40)

## 2019-05-20 NOTE — PROGRESS NOTES
Elevated BG. Will follow up as it might indicate insulin resistance.      Joaquim Hunter MD  PGY-3 Family Medicine Resident

## 2019-05-29 ENCOUNTER — HOSPITAL ENCOUNTER (OUTPATIENT)
Dept: MAMMOGRAPHY | Age: 64
Discharge: HOME | End: 2019-05-29
Attending: FAMILY MEDICINE
Payer: MEDICARE

## 2019-05-29 DIAGNOSIS — Z12.39 BREAST CANCER SCREENING: ICD-10-CM

## 2019-05-29 DIAGNOSIS — Z13.820 OSTEOPOROSIS SCREENING: ICD-10-CM

## 2019-05-29 DIAGNOSIS — M85.852 OTHER SPECIFIED DISORDERS OF BONE DENSITY AND STRUCTURE, LEFT THIGH: ICD-10-CM

## 2019-05-29 PROCEDURE — 77080 DXA BONE DENSITY AXIAL: CPT

## 2019-05-29 PROCEDURE — 77067 SCR MAMMO BI INCL CAD: CPT

## 2019-05-31 DIAGNOSIS — M81.0 AGE-RELATED OSTEOPOROSIS WITHOUT CURRENT PATHOLOGICAL FRACTURE: ICD-10-CM

## 2019-05-31 DIAGNOSIS — M81.0 AGE-RELATED OSTEOPOROSIS WITHOUT CURRENT PATHOLOGICAL FRACTURE: Primary | ICD-10-CM

## 2019-05-31 RX ORDER — CALCIUM CARBONATE/VITAMIN D3 600MG-5MCG
1 TABLET ORAL 2 TIMES DAILY WITH MEALS
Qty: 180 TAB | Refills: 3 | Status: SHIPPED | OUTPATIENT
Start: 2019-05-31 | End: 2019-05-31 | Stop reason: SDUPTHER

## 2019-05-31 RX ORDER — CALCIUM CARBONATE/VITAMIN D3 600MG-5MCG
1 TABLET ORAL 2 TIMES DAILY WITH MEALS
Qty: 180 TAB | Refills: 3 | Status: SHIPPED | OUTPATIENT
Start: 2019-05-31 | End: 2019-06-18 | Stop reason: ALTCHOICE

## 2019-05-31 NOTE — PROGRESS NOTES
Normal mammogram. Recommend repeat screening mammogram in 1 to 2 years.     Abi Rivero MD  PGY-3 Family Medicine Resident

## 2019-05-31 NOTE — PROGRESS NOTES
Results of DEXA scan suggest the presence of Osteoporosis. Letter was sent to patient with information. Will discuss treatment alternatives with patient during 6/18/2019 appointment. In the meanwhile, recommended starting Calcium/Vitamin D supplementation.      Grupo Walls MD  PGY-3 Family Medicine Resident

## 2019-06-18 ENCOUNTER — OFFICE VISIT (OUTPATIENT)
Dept: FAMILY MEDICINE CLINIC | Age: 64
End: 2019-06-18

## 2019-06-18 VITALS
WEIGHT: 134 LBS | DIASTOLIC BLOOD PRESSURE: 56 MMHG | SYSTOLIC BLOOD PRESSURE: 103 MMHG | HEART RATE: 71 BPM | OXYGEN SATURATION: 97 % | HEIGHT: 61 IN | BODY MASS INDEX: 25.3 KG/M2 | TEMPERATURE: 98.3 F | RESPIRATION RATE: 16 BRPM

## 2019-06-18 DIAGNOSIS — I10 ESSENTIAL HYPERTENSION: ICD-10-CM

## 2019-06-18 DIAGNOSIS — M81.0 AGE-RELATED OSTEOPOROSIS WITHOUT CURRENT PATHOLOGICAL FRACTURE: Primary | ICD-10-CM

## 2019-06-18 DIAGNOSIS — E88.81 INSULIN RESISTANCE: ICD-10-CM

## 2019-06-18 DIAGNOSIS — E78.2 MIXED HYPERLIPIDEMIA: Chronic | ICD-10-CM

## 2019-06-18 RX ORDER — LISINOPRIL 5 MG/1
5 TABLET ORAL DAILY
Qty: 30 TAB | Refills: 0 | Status: SHIPPED | OUTPATIENT
Start: 2019-06-18 | End: 2019-08-27 | Stop reason: SDUPTHER

## 2019-06-18 RX ORDER — CALCIUM CARBONATE 600 MG
600 TABLET ORAL 2 TIMES DAILY
Qty: 180 TAB | Refills: 3 | Status: SHIPPED | OUTPATIENT
Start: 2019-06-18

## 2019-06-18 RX ORDER — CHOLECALCIFEROL TAB 125 MCG (5000 UNIT) 125 MCG
5000 TAB ORAL DAILY
Qty: 90 TAB | Refills: 3 | COMMUNITY
Start: 2019-06-18

## 2019-06-18 RX ORDER — ALENDRONATE SODIUM 70 MG/1
70 TABLET ORAL
Qty: 12 TAB | Refills: 3 | Status: SHIPPED | OUTPATIENT
Start: 2019-06-18 | End: 2020-06-03 | Stop reason: SDUPTHER

## 2019-06-18 NOTE — PROGRESS NOTES
1. Have you been to the ER, urgent care clinic since your last visit? Hospitalized since your last visit? No    2. Have you seen or consulted any other health care providers outside of the [address] [place] [address] since your last visit? Include any pap smears or colon screening.  No  Reviewed record in preparation for visit and have necessary documentation  Pt did not bring medication to office visit for review    Goals that were addressed and/or need to be completed during or after this appointment include   Health Maintenance Due   Topic Date Due    Shingrix Vaccine Age 50> (1 of 2) 04/13/2004    GLAUCOMA SCREENING Q2Y  04/13/2019    COLONOSCOPY  07/27/2019

## 2019-06-18 NOTE — PROGRESS NOTES
[facility]     Outpatient Resident Progress Note    Encounter Date: 6/18/2019    Chief Complaint   Patient presents with    Follow-up     Lab mammogram, and DEXA scan results     History of Present Illness    Patient is a 71 y.o. female, who presents to clinic for Follow-up (Lab mammogram, and DEXA scan results)  Patient reports home  to 110 and DBP 55 to 65. Reports being compliant with medication and have no SE. She is being taking Lisinopril 10 mg instead of 20 mg since her BP was ranging low with dizziness in the morning. She reports still feeling dizziness and still having similar BP readings. Denies falls, LOC, sweats, fever, fatigue, headaches, changes in vision, CP, palpitations, SOB, nausea, vomiting, dysuria, hematuria, diarrhea, melena, hematochezia, leg swelling, or any other complains at this moment. Lab shows slight elevation in BG of 104. TChol 224, , HDL 66. Mammogram showed BI-RADS 1: Negative. No mammographic evidence of malignancy. On DEXA scan, there is evidence of osteoporosis. Review of Systems    A complete ROS was reviewed and only pertinent items documented on HPI. Allergies - reviewed: Allergies   Allergen Reactions    Percocet [Oxycodone-Acetaminophen] Nausea and Vomiting       Medications - reviewed:   Current Outpatient Medications   Medication Sig    cholecalciferol, VITAMIN D3, (VITAMIN D3) 5,000 unit tab tablet Take 1 Tab by mouth daily.  calcium carbonate (CALTREX) 600 mg calcium (1,500 mg) tablet Take 1 Tab by mouth two (2) times a day. Indications: osteoporosis    alendronate (FOSAMAX) 70 mg tablet Take 1 Tab by mouth every seven (7) days. Indications: Decreased Bone Mass Following Menopause    lisinopril (PRINIVIL, ZESTRIL) 5 mg tablet Take 1 Tab by mouth daily.  gabapentin (NEURONTIN) 300 mg capsule Take 2 Caps by mouth nightly.     diclofenac EC (VOLTAREN) 50 mg EC tablet Take 1 Tab by mouth two (2) times daily as needed for Pain.  cefPROZIL (CEFZIL) 500 mg tablet Take 1 Tab by mouth two (2) times a day. Indications: sinusitis    ascorbic acid, vitamin C, (VITAMIN C) 500 mg tablet Take  by mouth.  potassium 99 mg tablet Take 99 mg by mouth daily.  DOCOSAHEXANOIC ACID/EPA (FISH OIL PO) Take  by mouth.  aspirin delayed-release (ASPIR-81) 81 mg tablet Take 81 mg by mouth daily. No current facility-administered medications for this visit. Past Medical History - reviewed:  Past Medical History:   Diagnosis Date    Colon polyps 3/23/2010    Hypercholesterolemia     Sleep apnea 3/23/2010       Family Medical History - reviewed:  Family History   Problem Relation Age of Onset    Diabetes Mother     Hypertension Mother     Heart Disease Mother     Thyroid Disease Mother     Diabetes Sister     Heart Disease Sister     Hypertension Sister     Thyroid Disease Sister     Cancer Brother         brain       Objective  Visit Vitals  /56 (BP 1 Location: Left arm, BP Patient Position: Sitting)   Pulse 71   Temp 98.3 °F (36.8 °C) (Oral)   Resp 16   Ht 5' 1\" (1.549 m)   Wt 134 lb (60.8 kg)   LMP 01/01/2007   SpO2 97%   BMI 25.32 kg/m²     Body mass index is 25.32 kg/m². Nursing note and vitals reviewed. The following vital sign(s) noted to be abnormal: BP < 90/60    Physical Exam  Constitutional: Well-developed, well-nourished, and in no distress. Cardiovascular: Normal rate, regular rhythm, normal heart sounds and intact distal pulses. Exam reveals no gallop and no friction rub. No murmur heard. Pulmonary/Chest: Effort normal and breath sounds normal. No respiratory distress. No wheezes, no rales, no tenderness. Abdominal: Soft. Bowel sounds are normal. No distension and no mass. There is no tenderness. There is no rebound and no guarding. Musculoskeletal: Normal range of motion. Exhibits no edema, tenderness or deformity.   Neurological: Alert and oriented to person, place, and time. Normal reflexes. No cranial nerve deficit. Gait normal. Coordination normal.   Skin: Skin is warm and dry. No rash noted. Not diaphoretic. No erythema. No pallor. Psychiatric: Mood, memory, affect and judgment normal.     Assessment / Plan   Ms. Chana Kilpatrick is a 71 y.o. female with the following medical condition(s):    1. Age-related osteoporosis without current pathological fracture  Will start treatment at this time. Patient chose to take Fosamax 70 mg weekly. Discussed with patient special instructions on how to take biphosphonates in the morning with empty stomach and 6 to 8oz of plain water, wait 30 minutes before first PO intake of the day. She states to understand and questions were answered. Recommended to continue Calcium supplement of 1,200 mg daily. Written material provided. Will repeat DEXA scan in 2 years. - calcium carbonate (CALTREX) 600 mg calcium (1,500 mg) tablet; Take 1 Tab by mouth two (2) times a day. Indications: osteoporosis  Dispense: 180 Tab; Refill: 3  - alendronate (FOSAMAX) 70 mg tablet; Take 1 Tab by mouth every seven (7) days. Indications: Decreased Bone Mass Following Menopause  Dispense: 12 Tab; Refill: 3    2. Essential hypertension  Low BP in the office was discussed with patient. A log was provided and the patient was instructed on taking measurements right after waking up in the morning and just before bedtime. These results will be evaluated during next visit that was recommended to be within the next 2 weeks. Decreased Lisinopril from 10 mg to 5 mg daily. Did not stopped the medication, as patient have a Hx of very high BP.    - BP goal for this patient is < 150/90 mmHg  - Follow up in 1 week(s) for elevated BP reevaluation.     - lisinopril (PRINIVIL, ZESTRIL) 5 mg tablet; Take 1 Tab by mouth daily. Dispense: 30 Tab; Refill: 0    3. Insulin resistance  Will monitor at this time.  patient on carb control, physical activity, and healthy habits. 4. Mixed hyperlipidemia  Will continue monitoring.  patient healthy diet, physical activity, and healthy habits. Patient was advised to return to clinic in case of worsening of symptoms or fail to improve. Life threatening signs and symptoms were discussed and patient advised to go to the nearest ED for prompt evaluation and care in case of onset of any of these. Follow-up and Dispositions    · Return in about 1 week (around 6/25/2019), or if symptoms worsen or fail to improve, for Follow up of Hypertension. · I have discussed the diagnosis with the patient and the intended plan as seen in the above orders. The patient has received an after-visit summary and questions were answered concerning future plans. I have discussed medication side effects and warnings with the patient as well.       Patient/Plan discussed with Dr. Shawn Salas (Attending Physician)      Cortney Gusman MD  PGY-3 Family Medicine Resident  Encounter Date: 6/18/2019

## 2019-06-18 NOTE — PATIENT INSTRUCTIONS
Osteoporosis: Care Instructions  Your Care Instructions    Osteoporosis causes bones to become thin and weak. It is much more common in women than in men. Osteoporosis may be very advanced before you know you have it. Sometimes the first sign is a broken bone in the hip, spine, or wrist or sudden pain in your middle or lower back. Follow-up care is a key part of your treatment and safety. Be sure to make and go to all appointments, and call your doctor if you are having problems. It's also a good idea to know your test results and keep a list of the medicines you take. How can you care for yourself at home? · Your doctor may prescribe a bisphosphonate, such as risedronate (Actonel) or alendronate (Fosamax), for osteoporosis. If you are taking one of these medicines by mouth:  ? Take your medicine with a full glass of water when you first get up in the morning. ? Do not lie down, eat, drink a beverage, or take any other medicine for at least 30 minutes after taking the drug. This helps prevent stomach problems. ? Do not take your medicine late in the day if you forgot to take it in the morning. Skip it, and take the usual dose the next morning. ? If you have side effects, tell your doctor. He or she may prescribe another medicine. · Get enough calcium and vitamin D. The [place] [employer] recommends adults younger than age 46 need 1,000 mg of calcium and 600 IU of vitamin D each day. Women ages 46 to 79 need 1,200 mg of calcium and 600 IU of vitamin D each day. Men ages 46 to 79 need 1,000 mg of calcium and 600 IU of vitamin D each day. Adults 71 and older need 1,200 mg of calcium and 800 IU of vitamin D each day. ? Eat foods rich in calcium, like yogurt, cheese, milk, and dark green vegetables. This is a good way to get the calcium you need. You can get vitamin D from eggs, fatty fish, cereal, and milk. ? Talk to your doctor about taking a calcium plus vitamin D supplement. Be careful, though.  Adults ages 23 to 48 should not get more than 2,500 mg of calcium and 4,000 IU of vitamin D each day, whether it is from supplements and/or food. Adults ages 46 and older should not get more than 2,000 mg of calcium and 4,000 IU of vitamin D each day from supplements and/or food. · Limit alcohol to 2 drinks a day for men and 1 drink a day for women. Too much alcohol can cause health problems. · Do not smoke. Smoking puts you at a much higher risk for osteoporosis. If you need help quitting, talk to your doctor about stop-smoking programs and medicines. These can increase your chances of quitting for good. · Get regular bone-building exercise. Weight-bearing and resistance exercises keep bones healthy by working the muscles and bones against gravity. Start out at an exercise level that feels right for you. Add a little at a time until you can do the following:  ? Do 30 minutes of weight-bearing exercise on most days of the week. Walking, jogging, stair climbing, and dancing are good choices. ? Do resistance exercises with weights or elastic bands 2 to 3 days a week. · Reduce your risk of falls:  ? Wear supportive shoes with low heels and nonslip soles. ? Use a cane or walker, if you need it. Use shower chairs and bath benches. Put in handrails on stairways, around your shower or tub area, and near the toilet. ? Keep stairs, porches, and walkways well lit. Use night-lights. ? Remove throw rugs and other objects that are in the way. ? Avoid icy, wet, or slippery surfaces. ? Keep a cordless phone and a flashlight with new batteries by your bed. When should you call for help? Watch closely for changes in your health, and be sure to contact your doctor if you have any problems. Where can you learn more? Go to http://bea-aurora.info/. Enter K100 in the search box to learn more about \"Osteoporosis: Care Instructions. \"  Current as of: March 15, 2018  Content Version: 11.9  © 0254-1945 [employer]. Care instructions adapted under license by [employer] (which disclaims liability or warranty for this information). If you have questions about a medical condition or this instruction, always ask your healthcare professional. [address] any warranty or liability for your use of this information. (2) assistive person

## 2019-07-15 ENCOUNTER — OFFICE VISIT (OUTPATIENT)
Dept: FAMILY MEDICINE CLINIC | Age: 64
End: 2019-07-15

## 2019-07-15 VITALS
SYSTOLIC BLOOD PRESSURE: 109 MMHG | DIASTOLIC BLOOD PRESSURE: 62 MMHG | HEIGHT: 61 IN | HEART RATE: 72 BPM | OXYGEN SATURATION: 99 % | RESPIRATION RATE: 16 BRPM | BODY MASS INDEX: 24.96 KG/M2 | TEMPERATURE: 98.6 F | WEIGHT: 132.2 LBS

## 2019-07-15 DIAGNOSIS — M25.511 ACUTE PAIN OF RIGHT SHOULDER: Primary | ICD-10-CM

## 2019-07-15 RX ORDER — MELOXICAM 15 MG/1
15 TABLET ORAL DAILY
Qty: 30 TAB | Refills: 1 | Status: SHIPPED | OUTPATIENT
Start: 2019-07-15 | End: 2019-08-27 | Stop reason: SDUPTHER

## 2019-07-15 RX ORDER — DEXTROMETHORPHAN HYDROBROMIDE, GUAIFENESIN 5; 100 MG/5ML; MG/5ML
1300 LIQUID ORAL EVERY 8 HOURS
COMMUNITY
End: 2020-07-24

## 2019-07-15 RX ORDER — CHOLECALCIFEROL (VITAMIN D3) 125 MCG
CAPSULE ORAL
COMMUNITY
End: 2019-07-15 | Stop reason: ALTCHOICE

## 2019-07-15 NOTE — PROGRESS NOTES
Chief Complaint   Patient presents with    Shoulder Pain     Right -- Pain is worse at night     Body mass index is 24.98 kg/m². 1. Have you been to the ER, urgent care clinic since your last visit? Hospitalized since your last visit? No    2. Have you seen or consulted any other health care providers outside of the [address] [place] [address] since your last visit? Include any pap smears or colon screening.  No    Reviewed record in preparation for visit and have necessary documentation  Pt did not bring medication to office visit for review  Information was given to pt on Advanced Directives, Living Will  Information was given on Shingles Vaccine  Opportunity was given for questions  Goals that were addressed and/or need to be completed after this appointment include:     Health Maintenance Due   Topic Date Due    Shingrix Vaccine Age 50> (1 of 2) 04/13/2004    GLAUCOMA SCREENING Q2Y  04/13/2019    COLONOSCOPY  07/27/2019

## 2019-07-15 NOTE — LETTER
Name:Gibran Vanessa CTQ:7/83/[birth year]10 MR #:[id number] [address] [place] [address] Page 1 of 5 CONTROLLED SUBSTANCE AGREEMENT I may be prescribed medications that are controlled substances as part  of my treatment plan for management of my medical condition(s). The goal of my treatment plan is to maintain and/or improve my health and wellbeing. Because controlled substances have an increased risk of abuse or harm, continual re-evaluation is needed determine if the goals of my treatment plan are being met for my safety and the safety of others. I, Alex Nixon  am entering into this Controlled Substance Agreement with my provider, __________________________________ at [facility] . I understand that successful treatment requires mutual trust and honesty between me and my provider. I understand that there are state and federal laws and regulations which apply to the medications that my provider may prescribe that must be followed. I understand there are risks and benefits ts of taking the medicines that my provider may prescribe. I understand and agree that following this Agreement is necessary in continuing my provider-patient relationship and success of my treatment plan. As a part of my treatment plan, I agree to the following: COMMUNICATION: 
 
1. I will communicate fully with my provider about my medical condition(s), including the effect on my daily life and how well my medications are helping. I will tell my provider all of the medications that I take for any reason, including medications I receive from another health care provider, and will notify my provider about all issues, problems or concerns, including any side effects, which may be related to my medications. I understand that this information allows my provider to adjust my treatment plan to help manage my medical condition.  I understand that this information will become part of my permanent medical record. 2. I will notify my provider if I have a history of alcohol/drug misuse/addiction or if I have had treatment for alcohol/drug addiction in the past, or if I have a new problem with or concern about alcohol/drug use/addiction, because this increases the likelihood of high risk behaviors and may lead to serious medical conditions. 3. Females Only: I will notify my provider if I am or become pregnant, or if I intend to become pregnant, or if I intend to breastfeed. I understand that communication of these issues with my provider is important, due to possible effects my medication could have on an unborn fetus or breastfeeding child. Initials_____ Name:David Hewitt ICP:[birth year]10[birth year]82 MR #:[id number] [address] [place] [address] Page 2 of 5 MISUSE OF MEDICATIONS / DRUGS: 
 
1. I agree to take all controlled substances as prescribed, and will not misuse or abuse any controlled substances prescribed by my provider. For my safety, I will not increase the amount of medicine I take without first talking with and getting permission from my provider. 2. If I have a medical emergency, another health care provider may prescribe me medication. If I seek emergency treatment, I will notify my provider within seventy-two (72) hours. 3. I understand that my provider may discuss my use and/or possible misuse/abuse of controlled substances and alcohol, as appropriate, with any health care provider involved in my care, pharmacist or legal authority. ILLEGAL DRUGS: 
 
1. I will not use illegal drugs of any kind, including but not limited to marijuana, heroin, cocaine, or any prescription drug which is not prescribed to me. DRUG DIVERSION / PRESCRIPTION FRAUD: 
 
1. I will not share, sell, trade, give away, or otherwise misuse my prescriptions or medications. 2. I will not alter any prescriptions provided to me by my provider. SINGLE PROVIDER: 
 
1. I agree that all controlled substances that I take will be prescribed only by my provider (or his/her covering provider) under this Agreement. This agreement does not prevent me from seeking emergency medical treatment or receiving pain management related to a surgery. PROTECTING MEDICATIONS: 
 
1. I am responsible for keeping my prescriptions and medications in a safe and secure place including safeguarding them from loss or theft. I understand that lost, stolen or damaged/destroyed prescriptions or medications will not be replaced. Initials____ Name:David Quinones EYD:4[birth year] MR #:[id number] [address] [place] [address] Page 3 of 5 PRESCRIPTION RENEWALS/REFILLS: 
 
1. I will follow my controlled substance medication schedule as prescribed by my provider. 2. I understand and agree that I will make any requests for renewals or refills of my prescriptions only at the time of an office visit or during my providers regular office hours subject to the prescription refill requirements of the individual practice. 3. I understand that my provider may not call in prescriptions for controlled substances to my pharmacy. 4. I understand that my provider may adjust or discontinue these medications as deemed appropriate for my medical treatment plan. This Agreement does not guarantee the prescription of controlled medications. 5. I agree that if my medications are adjusted or discontinued, I will properly dispose of any remaining medications. I understand that I will be required to dispose of any remaining controlled medications prior to being provided with any prescriptions for other controlled medications.  
 
6. I understand that the renewal of my prescription depends on my medical condition, my consistent participation, and my adherence with my treatment plan and this Agreement. 7. I understand that if I do not keep an appointment with my provider, I may not receive a renewal or refill for my controlled substance medication. PRESCRIPTION MONITORING / DRUG TESTIN. I understand that my provider may require me to provide urine, saliva or blood for testing at any time. I understand that this testing will be used to monitor for safety and adherence with my treatment plan and this Agreement. 2. I understand that my provider may ask me to provide an observed urine specimen, which means that a nurse or other health care provider may watch me provide urine, and I agree to cooperate if I am asked to provide an observed specimen. 3. I understand that if I do not provide urine, saliva or blood samples within two (2) hours of my providers request, or other timeframe decided by my provider, my treatment plan could be changed, or my prescriptions and medications may be changed or ended. 4. I understand that urine, saliva and blood test results will be a part of my permanent medical record. Initials_____ Name:. Daily Pringle [facility]: MR #:[id number] [address] [place] [address] Page 4 of 5 
 
 
1. I authorize my provider and my pharmacy to cooperate fully with any local, state, or federal law enforcement agency in the investigation of any possible misuse, sale, or other diversion of my controlled substance prescriptions or medications. RISKS: 
 
1. I understand that my level of consciousness may be affected from the use of controlled substances, and I understand that there are risks, benefits, effects and potential alternatives (including no treatment) to the medications that my provider has prescribed. 2. I understand that I may become drowsy, tired, dizzy, constipated, and sick to my stomach, or have changes in my mood or in my sleep while taking my medications. I have talked with my provider about these possible side effects, risks, benefits, and alternative treatments, and my provider has answered all of my questions. 3. I understand that I should not suddenly stop taking my medications without first speaking with my provider. I understand that if I suddenly stop taking my medications, I may experience nausea, vomiting, sweating,anxiety, sleeplessness, itching or other uncomfortable feelings. 4. I will not take my medications with alcohol of any kind, including beer, wine or liquor. I understand that drinking alcohol with my medications increases the chances of side effects, including breathing problems or even death. 5. I understand that if I have a history of alcoholism or other drug addiction I may be at increased risk of addiction to my medications. Signs of addiction might include craving, compulsive use, and continued use despite harm. Since addiction is a disease, I understand my provider may decide to change my medications and refer me to appropriate treatment services. I understand that this information would become part of my permanent medical record. Initials_____ Name:David Griffiths [facility]:0/68/[birth year]58 MR #:[id number]  [facility] Page 5 of 5  
 
 
6. Females only: Children born to women who regularly take controlled substances are likely to have physical problems and suffer withdrawal symptoms at birth. If I am of child-bearing age, I understand that I should use safe and effective birth control while taking any controlled substances to avoid the impact of medications on an unborn fetus or  child. I agree to notify my provider immediately if I should become pregnant so that my treatment plan can be adjusted. 7. Males only: I understand that chronic use of controlled substances has been associated with low testosterone levels in males which may affect my mood, stamina, sexual desire, and general health. I understand that my provider may order the appropriate laboratory test to determine my testosterone level,and I agree to this testing. ADHERENCE: 
 
1. I understand that if I do not adhere to this Agreement in any way, my provider may change my prescriptions, stop prescribing controlled substances or end our provider-patient relationship. 2. If my provider decides to stop prescribing medication, or decides to end our provider-patient relationship,my provider may require that I taper my medications slowly. If necessary, my provider may also provide a prescription for other medications to treat my withdrawal symptoms. UNDERSTANDING THIS AGREEMENT: 
 
I understand that my provider may adjust or stop my prescriptions for controlled substances based on my medical condition and my treatment plan. I understand that this Agreement does not guarantee that I will be prescribed medications or controlled substances. I understand that controlled substances may be just one part 
of my treatment plan.  
 
My initial on each page and my signature below shows that I have read each page of this Agreement, I have had an opportunity to ask questions, and all of my questions have been answered to my satisfaction by my provider. By signing below, I agree to comply with this Agreement, and I understand that if I do not follow the Agreements listed above, my provider may stop 
 
_________________________________________  Date/Time 7/15/2019 2:13 PM   
             (Patient Signature) 
 
________________________________________    Date/Time 7/15/2019 2:13 PM 
 (Parent or Guardian Signature if <18 yrs) 
 
_________________________________________ Date/Time 7/15/2019 2:13 PM 
 (Provider Signature)

## 2019-07-15 NOTE — PATIENT INSTRUCTIONS
Shoulder Arthritis: Exercises  Your Care Instructions  Here are some examples of typical rehabilitation exercises for your condition. Start each exercise slowly. Ease off the exercise if you start to have pain. Your doctor or physical therapist will tell you when you can start these exercises and which ones will work best for you. How to do the exercises  Shoulder flexion (lying down)    1. Lie on your back, holding a wand with both hands. Your palms should face down as you hold the wand. 2. Keeping your elbows straight, slowly raise your arms over your head. Raise them until you feel a stretch in your shoulders, upper back, and chest.  3. Hold for 15 to 30 seconds. 4. Repeat 2 to 4 times. Shoulder rotation (lying down)    1. Lie on your back. Hold a wand with both hands with your elbows bent and palms up. 2. Keep your elbows close to your body, and move the wand across your body toward the sore arm. 3. Hold for 8 to 12 seconds. 4. Repeat 2 to 4 times. Shoulder internal rotation with towel    1. Hold a towel above and behind your head with the arm that is not sore. 2. With your sore arm, reach behind your back and grasp the towel. 3. With the arm above your head, pull the towel upward. Do this until you feel a stretch on the front and outside of your sore shoulder. 4. Hold 15 to 30 seconds. 5. Repeat 2 to 4 times. Shoulder blade squeeze    1. Stand with your arms at your sides, and squeeze your shoulder blades together. Do not raise your shoulders up as you squeeze. 2. Hold 6 seconds. 3. Repeat 8 to 12 times. Resisted rows    1. Put the band around a solid object at about waist level. (A bedpost will work well.) Each hand should hold an end of the band. 2. With your elbows at your sides and bent to 90 degrees, pull the band back. Your shoulder blades should move toward each other. Return to the starting position. 3. Repeat 8 to 12 times.     External rotator strengthening exercise    1. Start by tying a piece of elastic exercise material to a doorknob. You can use surgical tubing or Thera-Band. (You may also hold one end of the band in each hand.)  2. Stand or sit with your shoulder relaxed and your elbow bent 90 degrees. Your upper arm should rest comfortably against your side. Squeeze a rolled towel between your elbow and your body for comfort. This will help keep your arm at your side. 3. Hold one end of the elastic band with the hand of the painful arm. 4. Start with your forearm across your belly. Slowly rotate the forearm out away from your body. Keep your elbow and upper arm tucked against the towel roll or the side of your body until you begin to feel tightness in your shoulder. Slowly move your arm back to where you started. 5. Repeat 8 to 12 times. Internal rotator strengthening exercise    1. Start by tying a piece of elastic exercise material to a doorknob. You can use surgical tubing or Thera-Band. 2. Stand or sit with your shoulder relaxed and your elbow bent 90 degrees. Your upper arm should rest comfortably against your side. Squeeze a rolled towel between your elbow and your body for comfort. This will help keep your arm at your side. 3. Hold one end of the elastic band in the hand of the painful arm. 4. Slowly rotate your forearm toward your body until it touches your belly. Slowly move it back to where you started. 5. Keep your elbow and upper arm firmly tucked against the towel roll or at your side. 6. Repeat 8 to 12 times. Pendulum swing    1. Hold on to a table or the back of a chair with your good arm. Then bend forward a little and let your sore arm hang straight down. This exercise does not use the arm muscles. Rather, use your legs and your hips to create movement that makes your arm swing freely. 2. Use the movement from your hips and legs to guide the slightly swinging arm back and forth like a pendulum (or elephant trunk).  Then guide it in circles that start small (about the size of a dinner plate). Make the circles a bit larger each day, as your pain allows. 3. Do this exercise for 5 minutes, 5 to 7 times each day. 4. As you have less pain, try bending over a little farther to do this exercise. This will increase the amount of movement at your shoulder. Follow-up care is a key part of your treatment and safety. Be sure to make and go to all appointments, and call your doctor if you are having problems. It's also a good idea to know your test results and keep a list of the medicines you take. Where can you learn more? Go to http://bea-aurora.info/. Enter H562 in the search box to learn more about \"Shoulder Arthritis: Exercises. \"  Current as of: September 20, 2018  Content Version: 11.9  © 3334-2942 [employer]. Care instructions adapted under license by [employer] (which disclaims liability or warranty for this information). If you have questions about a medical condition or this instruction, always ask your healthcare professional. [address] any warranty or liability for your use of this information.
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
## 2019-07-16 ENCOUNTER — TELEPHONE (OUTPATIENT)
Dept: FAMILY MEDICINE CLINIC | Age: 64
End: 2019-07-16

## 2019-07-16 NOTE — TELEPHONE ENCOUNTER
Phone call to patient to discuss recent xray results. Advised patient of the following per Dr. Kei Hannon:    Ramo Perez has mild arthritis in his shoulder. Let me know if the medication does not help. \"    Patient verbalized understanding.

## 2019-07-26 ENCOUNTER — OFFICE VISIT (OUTPATIENT)
Dept: FAMILY MEDICINE CLINIC | Age: 64
End: 2019-07-26

## 2019-07-26 NOTE — PROGRESS NOTES
Pt declines ortho referral pt states \" I don't have time to go to [place]\"  Pt request to contact her when or if medication becomes available for injection

## 2019-08-27 DIAGNOSIS — I10 ESSENTIAL HYPERTENSION: ICD-10-CM

## 2019-08-27 RX ORDER — LISINOPRIL 5 MG/1
5 TABLET ORAL DAILY
Qty: 30 TAB | Refills: 5 | Status: SHIPPED | OUTPATIENT
Start: 2019-08-27 | End: 2019-10-14 | Stop reason: SDUPTHER

## 2019-08-27 RX ORDER — MELOXICAM 15 MG/1
TABLET ORAL
Qty: 30 TAB | Refills: 0 | Status: SHIPPED | OUTPATIENT
Start: 2019-08-27 | End: 2020-03-19 | Stop reason: SINTOL

## 2019-10-11 ENCOUNTER — CLINICAL SUPPORT (OUTPATIENT)
Dept: FAMILY MEDICINE CLINIC | Age: 64
End: 2019-10-11

## 2019-10-11 VITALS
WEIGHT: 132 LBS | OXYGEN SATURATION: 99 % | HEART RATE: 63 BPM | DIASTOLIC BLOOD PRESSURE: 65 MMHG | BODY MASS INDEX: 24.92 KG/M2 | RESPIRATION RATE: 18 BRPM | TEMPERATURE: 97.7 F | HEIGHT: 61 IN | SYSTOLIC BLOOD PRESSURE: 129 MMHG

## 2019-10-11 DIAGNOSIS — Z23 ENCOUNTER FOR IMMUNIZATION: Primary | ICD-10-CM

## 2019-10-11 NOTE — PROGRESS NOTES
Georgi Back is a 71 y.o. female who presents for routine immunizations. She denies any symptoms , reactions or allergies that would exclude them from being immunized today. Risks and adverse reactions were discussed and the VIS was given to them. All questions were addressed. There were no reactions observed.     [address] [facility]

## 2019-10-14 DIAGNOSIS — I10 ESSENTIAL HYPERTENSION: ICD-10-CM

## 2019-10-14 RX ORDER — LISINOPRIL 5 MG/1
5 TABLET ORAL DAILY
Qty: 90 TAB | Refills: 2 | Status: SHIPPED | OUTPATIENT
Start: 2019-10-14 | End: 2020-06-03 | Stop reason: SDUPTHER

## 2019-11-27 DIAGNOSIS — M79.604 LEG PAIN, RIGHT: Chronic | ICD-10-CM

## 2019-11-27 DIAGNOSIS — M54.31 RIGHT SCIATIC NERVE PAIN: ICD-10-CM

## 2019-11-27 RX ORDER — GABAPENTIN 300 MG/1
600 CAPSULE ORAL
Qty: 60 CAP | Refills: 0 | Status: SHIPPED | OUTPATIENT
Start: 2019-11-27 | End: 2019-12-18 | Stop reason: SDUPTHER

## 2019-11-27 NOTE — TELEPHONE ENCOUNTER
Caller's first/last name:  Chong's     Name and dosage of medication:  Gabapentin 300 mg    Best contact number:  [phone]    Further clarification of call:      Refill request received from [facility] for Gabapentin

## 2019-12-18 ENCOUNTER — OFFICE VISIT (OUTPATIENT)
Dept: FAMILY MEDICINE CLINIC | Age: 64
End: 2019-12-18

## 2019-12-18 ENCOUNTER — HOSPITAL ENCOUNTER (OUTPATIENT)
Dept: LAB | Age: 64
Discharge: HOME | End: 2019-12-18

## 2019-12-18 VITALS
RESPIRATION RATE: 20 BRPM | BODY MASS INDEX: 25.37 KG/M2 | WEIGHT: 134.4 LBS | OXYGEN SATURATION: 98 % | DIASTOLIC BLOOD PRESSURE: 66 MMHG | HEIGHT: 61 IN | SYSTOLIC BLOOD PRESSURE: 119 MMHG | TEMPERATURE: 98.6 F | HEART RATE: 70 BPM

## 2019-12-18 DIAGNOSIS — M79.604 LEG PAIN, RIGHT: Chronic | ICD-10-CM

## 2019-12-18 DIAGNOSIS — M25.562 CHRONIC PAIN OF LEFT KNEE: ICD-10-CM

## 2019-12-18 DIAGNOSIS — G89.29 CHRONIC PAIN OF LEFT KNEE: ICD-10-CM

## 2019-12-18 DIAGNOSIS — M54.31 RIGHT SCIATIC NERVE PAIN: ICD-10-CM

## 2019-12-18 DIAGNOSIS — G89.4 CHRONIC PAIN SYNDROME: ICD-10-CM

## 2019-12-18 DIAGNOSIS — M17.12 ARTHRITIS OF LEFT KNEE: Primary | Chronic | ICD-10-CM

## 2019-12-18 PROBLEM — M51.36 DDD (DEGENERATIVE DISC DISEASE), LUMBAR: Chronic | Status: ACTIVE | Noted: 2019-12-18

## 2019-12-18 PROBLEM — M41.56 OTHER SECONDARY SCOLIOSIS, LUMBAR REGION: Chronic | Status: ACTIVE | Noted: 2019-12-18

## 2019-12-18 RX ORDER — GABAPENTIN 300 MG/1
600 CAPSULE ORAL
Qty: 60 CAP | Refills: 2 | Status: SHIPPED | OUTPATIENT
Start: 2019-12-18 | End: 2020-03-18 | Stop reason: SDUPTHER

## 2019-12-18 NOTE — PROGRESS NOTES
Chief Complaint   Patient presents with    Injection     Knee Injection     Body mass index is 25.39 kg/m². 1. Have you been to the ER, urgent care clinic since your last visit? Hospitalized since your last visit? No    2. Have you seen or consulted any other health care providers outside of the [address] [place] [address] since your last visit? Include any pap smears or colon screening.  No    Reviewed record in preparation for visit and have necessary documentation  Pt did not bring medication to office visit for review  Information was given to pt on Advanced Directives, Living Will  Information was given on Shingles Vaccine  Opportunity was given for questions  Goals that were addressed and/or need to be completed after this appointment include:     Health Maintenance Due   Topic Date Due    Shingrix Vaccine Age 50> (1 of 2) 04/13/2004    GLAUCOMA SCREENING Q2Y  04/13/2019    COLONOSCOPY  07/27/2019
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
## 2019-12-18 NOTE — LETTER
Name:.Nayla Vasquez  TGM:[birth year] MR #:[id number] [address] [facility] Page 1 of 5 CONTROLLED SUBSTANCE AGREEMENT I may be prescribed medications that are controlled substances as part  of my treatment plan for management of my medical condition(s). The goal of my treatment plan is to maintain and/or improve my health and wellbeing. Because controlled substances have an increased risk of abuse or harm, continual re-evaluation is needed determine if the goals of my treatment plan are being met for my safety and the safety of others. I, [place]  am entering into this Controlled Substance Agreement with my provider, __________________________________ at [facility] . I understand that successful treatment requires mutual trust and honesty between me and my provider. I understand that there are state and federal laws and regulations which apply to the medications that my provider may prescribe that must be followed. I understand there are risks and benefits ts of taking the medicines that my provider may prescribe. I understand and agree that following this Agreement is necessary in continuing my provider-patient relationship and success of my treatment plan. As a part of my treatment plan, I agree to the following: COMMUNICATION: 
 
1. I will communicate fully with my provider about my medical condition(s), including the effect on my daily life and how well my medications are helping. I will tell my provider all of the medications that I take for any reason, including medications I receive from another health care provider, and will notify my provider about all issues, problems or concerns, including any side effects, which may be related to my medications. I understand that this information allows my provider to adjust my treatment plan to help manage my medical condition.  I understand that this information will become part of my permanent medical record. 2. I will notify my provider if I have a history of alcohol/drug misuse/addiction or if I have had treatment for alcohol/drug addiction in the past, or if I have a new problem with or concern about alcohol/drug use/addiction, because this increases the likelihood of high risk behaviors and may lead to serious medical conditions. 3. Females Only: I will notify my provider if I am or become pregnant, or if I intend to become pregnant, or if I intend to breastfeed. I understand that communication of these issues with my provider is important, due to possible effects my medication could have on an unborn fetus or breastfeeding child. Initials_____ Name:David Lovett UTB:0/47/1260 MR #:[id number] [address] [place] [address] Page 2 of 5 MISUSE OF MEDICATIONS / DRUGS: 
 
1. I agree to take all controlled substances as prescribed, and will not misuse or abuse any controlled substances prescribed by my provider. For my safety, I will not increase the amount of medicine I take without first talking with and getting permission from my provider. 2. If I have a medical emergency, another health care provider may prescribe me medication. If I seek emergency treatment, I will notify my provider within seventy-two (72) hours. 3. I understand that my provider may discuss my use and/or possible misuse/abuse of controlled substances and alcohol, as appropriate, with any health care provider involved in my care, pharmacist or legal authority. ILLEGAL DRUGS: 
 
1. I will not use illegal drugs of any kind, including but not limited to marijuana, heroin, cocaine, or any prescription drug which is not prescribed to me. DRUG DIVERSION / PRESCRIPTION FRAUD: 
 
1. I will not share, sell, trade, give away, or otherwise misuse my prescriptions or medications. 2. I will not alter any prescriptions provided to me by my provider. SINGLE PROVIDER: 
 
1. I agree that all controlled substances that I take will be prescribed only by my provider (or his/her covering provider) under this Agreement. This agreement does not prevent me from seeking emergency medical treatment or receiving pain management related to a surgery. PROTECTING MEDICATIONS: 
 
1. I am responsible for keeping my prescriptions and medications in a safe and secure place including safeguarding them from loss or theft. I understand that lost, stolen or damaged/destroyed prescriptions or medications will not be replaced. Initials____ Name:David Wright UXS:[birth year]63/[birth year]38 MR #:[id number] [address] [place] [address] Page 3 of 5 PRESCRIPTION RENEWALS/REFILLS: 
 
1. I will follow my controlled substance medication schedule as prescribed by my provider. 2. I understand and agree that I will make any requests for renewals or refills of my prescriptions only at the time of an office visit or during my providers regular office hours subject to the prescription refill requirements of the individual practice. 3. I understand that my provider may not call in prescriptions for controlled substances to my pharmacy. 4. I understand that my provider may adjust or discontinue these medications as deemed appropriate for my medical treatment plan. This Agreement does not guarantee the prescription of controlled medications. 5. I agree that if my medications are adjusted or discontinued, I will properly dispose of any remaining medications. I understand that I will be required to dispose of any remaining controlled medications prior to being provided with any prescriptions for other controlled medications.  
 
6. I understand that the renewal of my prescription depends on my medical condition, my consistent participation, and my adherence with my treatment plan and this Agreement. 7. I understand that if I do not keep an appointment with my provider, I may not receive a renewal or refill for my controlled substance medication. PRESCRIPTION MONITORING / DRUG TESTIN. I understand that my provider may require me to provide urine, saliva or blood for testing at any time. I understand that this testing will be used to monitor for safety and adherence with my treatment plan and this Agreement. 2. I understand that my provider may ask me to provide an observed urine specimen, which means that a nurse or other health care provider may watch me provide urine, and I agree to cooperate if I am asked to provide an observed specimen. 3. I understand that if I do not provide urine, saliva or blood samples within two (2) hours of my providers request, or other timeframe decided by my provider, my treatment plan could be changed, or my prescriptions and medications may be changed or ended. 4. I understand that urine, saliva and blood test results will be a part of my permanent medical record. Initials_____ Name:David Nicholson [facility]: MR #:[id number] [address] [place] [address] Page 4 of 5 
 
 
1. I authorize my provider and my pharmacy to cooperate fully with any local, state, or federal law enforcement agency in the investigation of any possible misuse, sale, or other diversion of my controlled substance prescriptions or medications. RISKS: 
 
1. I understand that my level of consciousness may be affected from the use of controlled substances, and I understand that there are risks, benefits, effects and potential alternatives (including no treatment) to the medications that my provider has prescribed. 2. I understand that I may become drowsy, tired, dizzy, constipated, and sick to my stomach, or have changes in my mood or in my sleep while taking my medications. I have talked with my provider about these possible side effects, risks, benefits, and alternative treatments, and my provider has answered all of my questions. 3. I understand that I should not suddenly stop taking my medications without first speaking with my provider. I understand that if I suddenly stop taking my medications, I may experience nausea, vomiting, sweating,anxiety, sleeplessness, itching or other uncomfortable feelings. 4. I will not take my medications with alcohol of any kind, including beer, wine or liquor. I understand that drinking alcohol with my medications increases the chances of side effects, including breathing problems or even death. 5. I understand that if I have a history of alcoholism or other drug addiction I may be at increased risk of addiction to my medications. Signs of addiction might include craving, compulsive use, and continued use despite harm. Since addiction is a disease, I understand my provider may decide to change my medications and refer me to appropriate treatment services. I understand that this information would become part of my permanent medical record. Initials_____ Name:David Uribe RUL:[birth year]64[birth year]324 MR #:[id number]  [facility] Page 5 of 5  
 
 
6. Females only: Children born to women who regularly take controlled substances are likely to have physical problems and suffer withdrawal symptoms at birth. If I am of child-bearing age, I understand that I should use safe and effective birth control while taking any controlled substances to avoid the impact of medications on an unborn fetus or  child. I agree to notify my provider immediately if I should become pregnant so that my treatment plan can be adjusted. 7. Males only: I understand that chronic use of controlled substances has been associated with low testosterone levels in males which may affect my mood, stamina, sexual desire, and general health. I understand that my provider may order the appropriate laboratory test to determine my testosterone level,and I agree to this testing. ADHERENCE: 
 
1. I understand that if I do not adhere to this Agreement in any way, my provider may change my prescriptions, stop prescribing controlled substances or end our provider-patient relationship. 2. If my provider decides to stop prescribing medication, or decides to end our provider-patient relationship,my provider may require that I taper my medications slowly. If necessary, my provider may also provide a prescription for other medications to treat my withdrawal symptoms. UNDERSTANDING THIS AGREEMENT: 
 
I understand that my provider may adjust or stop my prescriptions for controlled substances based on my medical condition and my treatment plan. I understand that this Agreement does not guarantee that I will be prescribed medications or controlled substances. I understand that controlled substances may be just one part 
of my treatment plan.  
 
My initial on each page and my signature below shows that I have read each page of this Agreement, I have had an opportunity to ask questions, and all of my questions have been answered to my satisfaction by my provider. By signing below, I agree to comply with this Agreement, and I understand that if I do not follow the Agreements listed above, my provider may stop 
 
_________________________________________  Date/Time 12/18/2019 1:52 PM   
             (Patient Signature) 
 
________________________________________    Date/Time 12/18/2019 1:52 PM 
 (Parent or Guardian Signature if <18 yrs) 
 
_________________________________________ Date/Time 12/18/2019 1:52 PM 
 (Provider Signature)

## 2019-12-21 LAB
BENZODIAZEPINES, 791542: NEGATIVE NG/ML
MEPERIDINE SERPLBLD-MCNC: NEGATIVE NG/ML
MEPERIDINE SERPLBLD-MCNC: NEGATIVE UG/ML
METHADONE, 791633: NEGATIVE NG/ML
O-NORTRAMADOL BLD-MCNC: NEGATIVE NG/ML
OPIATES: NEGATIVE NG/ML
OXYCODONES, 738315: NEGATIVE NG/ML
PROPOXYPHENE, 791635: NEGATIVE NG/ML
THC (MARIJUANA) METABOLITE, 761546: NEGATIVE NG/ML
TRAMADOL BLD-MCNC: NEGATIVE NG/ML

## 2020-01-30 NOTE — TELEPHONE ENCOUNTER
----- Message from Laney Carpio MD sent at 2/13/2017  4:53 PM EST -----  Please call at he and tell her that her chest x-ray is normal.  However her knee x-ray shows moderate degeneration of the medial joint space E arthritis. It also shows osteopenia which is thinning of the bones. And she had a DEXA scan recently? She can try Aleve for knee when it acts up. When she is ready to see the orthopedist let me know. Encounter Date: 1/30/2020       History     Chief Complaint   Patient presents with    Wound Check     Pt seeing wound care for RLE. Has worsening Necrosis to toes. to see Dr. Renteria and possible BKA.      49-year-old female with multiple comorbidities who is currently with wound care for right lower extremity diabetic wound presents the ED with referral from wound Care for admission for worsening appearance of wound.  Patient states that she has had worsening appearance for approximately 3 weeks.  She states that she has had pain to the right lower extremity for approximately 1 week.  Patient has known vascular disease and was instructed to go the ED for further evaluation and probable admission.  She denies any other complaints at this time including fever chills or pain radiation.    The history is provided by the patient.     Review of patient's allergies indicates:  No Known Allergies  Past Medical History:   Diagnosis Date    Anemia in ESRD (end-stage renal disease) 4/10/2013    Cellulitis of foot 2/21/2019    CHF (congestive heart failure)     Critical lower limb ischemia     Cysts of both ovaries 4/30/2018    Diabetic ulcer of right heel associated with type 2 diabetes mellitus 6/25/2019    Diastolic dysfunction without heart failure     Encounter for blood transfusion     Gangrene of left foot 2/21/2019    Hyperlipidemia     Hypertension     Malignant hypertension with ESRD (end stage renal disease)     Morbid obesity with BMI of 45.0-49.9, adult 3/16/2017    AIMEE (obstructive sleep apnea)     Osteomyelitis of left foot 2/21/2019    Pseudoaneurysm of arteriovenous dialysis fistula     Left arm    Pseudoaneurysm of arteriovenous dialysis fistula     Steal syndrome of dialysis vascular access 4/12/2018    Stroke     Thrombosis of arteriovenous graft 6/26/2019    Type 2 diabetes mellitus, uncontrolled, with renal complications      Past Surgical History:   Procedure Laterality Date     AMPUTATION      ANGIOGRAPHY OF LOWER EXTREMITY N/A 2019    Procedure: Angiogram Extremity bilateral;  Surgeon: Edward Quintana MD PhD;  Location: [facility] CATH LAB;  Service: Cardiology;  Laterality: N/A;    ANGIOGRAPHY OF LOWER EXTREMITY Right 2019    Procedure: Angiogram Extremity Unilateral, right;  Surgeon: Judd Galarza MD;  Location: [facility] CATH LAB;  Service: Peripheral Vascular;  Laterality: Right;     SECTION, CLASSIC      x2    CHOLECYSTECTOMY      DEBRIDEMENT OF LOWER EXTREMITY Right 10/10/2019    Procedure: DEBRIDEMENT, LOWER EXTREMITY;  Surgeon: Alena Solorio MD;  Location: [facility] OR;  Service: General;  Laterality: Right;    DEBRIDEMENT OF LOWER EXTREMITY Right 11/15/2019    Procedure: DEBRIDEMENT, LOWER EXTREMITY;  Surgeon: Alena Solorio MD;  Location: [facility];  Service: General;  Laterality: Right;    DECLOTTING OF VASCULAR GRAFT Left 2019    Procedure: DECLOT-GRAFT;  Surgeon: Judd Galarza MD;  Location: [facility] CATH LAB;  Service: Peripheral Vascular;  Laterality: Left;    FISTULOGRAM N/A 7/10/2019    Procedure: Fistulogram;  Surgeon: Sohan Alvarado MD;  Location: [facility] CATH LAB/EP;  Service: Cardiology;  Laterality: N/A;    FOOT AMPUTATION THROUGH METATARSAL Left 2019    Procedure: AMPUTATION, FOOT, TRANSMETATARSAL;  Surgeon: Liliane Hyatt DPM;  Location: [facility] OR;  Service: Podiatry;  Laterality: Left;  4th and 5th partial ray amputatuion      FOOT AMPUTATION THROUGH METATARSAL Left 4/10/2019    Procedure: AMPUTATION, FOOT, TRANSMETATARSAL with wound vac application;  Surgeon: Liliane Hyatt DPM;  Location: [facility];  Service: Podiatry;  Laterality: Left;  I am availiable at 11:30.   Thank you      FOOT AMPUTATION THROUGH METATARSAL Left 2019    Procedure: AMPUTATION, FOOT, TRANSMETATARSAL;  Surgeon: Liliane Hyatt DPM;  Location: [facility] OR;  Service: Podiatry;  Laterality: Left;    GASTRECTOMY      gastric sleeve      INCISION AND DRAINAGE OF  WOUND      MECHANICAL THROMBOLYSIS Left 7/10/2019    Procedure: Thrombolysis - bypass graft;  Surgeon: Sohan Alvarado MD;  Location: [facility] CATH LAB/EP;  Service: Cardiology;  Laterality: Left;    PERCUTANEOUS TRANSLUMINAL ANGIOPLASTY (PTA) OF PERIPHERAL VESSEL Left 3/14/2019    Procedure: PTA, PERIPHERAL VESSEL;  Surgeon: Edward Quintana MD PhD;  Location: [facility] CATH LAB;  Service: Cardiology;  Laterality: Left;    PERCUTANEOUS TRANSLUMINAL ANGIOPLASTY (PTA) OF PERIPHERAL VESSEL Left 4/4/2019    Procedure: PTA, PERIPHERAL VESSEL;  Surgeon: Parish Renteria MD;  Location: [facility] CATH LAB/EP;  Service: Cardiology;  Laterality: Left;    PERCUTANEOUS TRANSLUMINAL ANGIOPLASTY OF ARTERIOVENOUS FISTULA N/A 7/10/2019    Procedure: PTA, AV FISTULA;  Surgeon: Sohan Alvarado MD;  Location: [facility] CATH LAB/EP;  Service: Cardiology;  Laterality: N/A;    THROMBECTOMY Left 8/19/2019    Procedure: THROMBECTOMY;  Surgeon: Alena Solorio MD;  Location: [facility] OR;  Service: General;  Laterality: Left;    TUBAL LIGATION  2010    VASCULAR SURGERY      fistula construction L upper arm     Family History   Problem Relation Age of Onset    Breast cancer Mother     Ulcers Father     Heart disease Father     Colon cancer Maternal Grandfather     Ovarian cancer Neg Hx      Social History     Tobacco Use    Smoking status: Never Smoker    Smokeless tobacco: Never Used   Substance Use Topics    Alcohol use: No    Drug use: No     Review of Systems   Constitutional: Negative for chills and fever.   Respiratory: Negative for shortness of breath.    Cardiovascular: Negative for chest pain and leg swelling.   Gastrointestinal: Negative for nausea and vomiting.   Genitourinary: Negative for decreased urine volume.   Musculoskeletal: Positive for arthralgias. Negative for back pain.   Skin: Positive for color change and wound.   Allergic/Immunologic: Positive for immunocompromised state.   Neurological: Negative for weakness.    Hematological: Does not bruise/bleed easily.       Physical Exam     Initial Vitals [01/30/20 1316]   BP Pulse Resp Temp SpO2   119/76 79 17 97.8 °F (36.6 °C) 100 %      MAP       --         Physical Exam    Nursing note and vitals reviewed.  Constitutional: She appears well-developed and well-nourished. She is cooperative.  Non-toxic appearance. She appears ill. She appears distressed.   Chronically frail appearing   HENT:   Head: Normocephalic and atraumatic.   Eyes: Conjunctivae and lids are normal.   Neck: Neck supple. No neck rigidity.   Cardiovascular: Normal rate and regular rhythm.   Pulses:       Dorsalis pedis pulses are 0 on the right side.        Posterior tibial pulses are 0 on the right side.   Pulmonary/Chest: Breath sounds normal. No respiratory distress. She has no wheezes. She has no rhonchi.   Abdominal: Soft. Normal appearance. There is no tenderness. There is no rigidity.   Neurological: She is alert and oriented to person, place, and time. GCS eye subscore is 4. GCS verbal subscore is 5. GCS motor subscore is 6.   Skin: Skin is warm and dry. No rash noted. There is erythema.   Psychiatric: She has a normal mood and affect. Her speech is normal and behavior is normal. Thought content normal.                 ED Course   Procedures  Labs Reviewed   CULTURE, BLOOD   CULTURE, BLOOD   CBC W/ AUTO DIFFERENTIAL   COMPREHENSIVE METABOLIC PANEL   TROPONIN I   LACTIC ACID, PLASMA   C-REACTIVE PROTEIN   SEDIMENTATION RATE   URINALYSIS, REFLEX TO URINE CULTURE   POCT GLUCOSE MONITORING CONTINUOUS          Imaging Results    None          Medical Decision Making:   Initial Assessment:   49-year-old female presents from wound care with worsening appearance of right lower extremity wound.  Patient has apparent necrosis to right lower extremity.  See picture for further evaluation.  Patient denies any other complaints at this time.  Remaining exam grossly unremarkable.  Differential Diagnosis:   Differential  Diagnosis includes, but is not limited to:  Fracture, dislocation, compartment syndrome, nerve injury/palsy, vascular injury, rhabdomyolysis, hemarthrosis, septic joint, bursitis, muscle strain, ligament tear/sprain, laceration with foreign body, abrasion, soft tissue contusion, osteoarthritis.    Clinical Tests:   Lab Tests: Ordered and Reviewed  Radiological Study: Ordered and Reviewed  ED Management:  Given the wound appearance and multiple comorbidities labs including blood cultures and lactic obtained. I did review patient's previous wound culture and will initiate meropenem as she has had showed Pseudomonas with susceptibility to this antibiotic.  Did discuss with vascular surgeon who is currently in a case with recommendations to admit to internist with consult to them.  This was completed.  Patient was informed of plan and is agreeable.  At this time I do not consider sepsis as she is afebrile and normotensive.                                  Clinical Impression:       ICD-10-CM ICD-9-CM   1. Wound dehiscence T81.30XA 998.30   2. Preop examination Z01.818 V72.84   3. Necrosis I96 [id number]   4. Arterial occlusion I70.90 444.9                             ROMERO Villagran  01/30/20 1431

## 2020-03-18 ENCOUNTER — HOSPITAL ENCOUNTER (OUTPATIENT)
Dept: LAB | Age: 65
Discharge: HOME | End: 2020-03-18

## 2020-03-18 ENCOUNTER — TELEPHONE (OUTPATIENT)
Dept: FAMILY MEDICINE CLINIC | Age: 65
End: 2020-03-18

## 2020-03-18 ENCOUNTER — OFFICE VISIT (OUTPATIENT)
Dept: FAMILY MEDICINE CLINIC | Age: 65
End: 2020-03-18

## 2020-03-18 VITALS
WEIGHT: 133 LBS | BODY MASS INDEX: 25.11 KG/M2 | SYSTOLIC BLOOD PRESSURE: 138 MMHG | RESPIRATION RATE: 20 BRPM | HEIGHT: 61 IN | HEART RATE: 64 BPM | TEMPERATURE: 98.6 F | OXYGEN SATURATION: 99 % | DIASTOLIC BLOOD PRESSURE: 73 MMHG

## 2020-03-18 DIAGNOSIS — M17.32 POST-TRAUMATIC OSTEOARTHRITIS OF LEFT KNEE: ICD-10-CM

## 2020-03-18 DIAGNOSIS — E78.2 MIXED HYPERLIPIDEMIA: Chronic | ICD-10-CM

## 2020-03-18 DIAGNOSIS — M54.31 RIGHT SCIATIC NERVE PAIN: ICD-10-CM

## 2020-03-18 DIAGNOSIS — I10 ESSENTIAL HYPERTENSION: Primary | ICD-10-CM

## 2020-03-18 DIAGNOSIS — I10 ESSENTIAL HYPERTENSION: ICD-10-CM

## 2020-03-18 DIAGNOSIS — H65.91 RIGHT OTITIS MEDIA WITH EFFUSION: ICD-10-CM

## 2020-03-18 DIAGNOSIS — Z12.11 COLON CANCER SCREENING: ICD-10-CM

## 2020-03-18 DIAGNOSIS — M79.604 LEG PAIN, RIGHT: Chronic | ICD-10-CM

## 2020-03-18 DIAGNOSIS — M51.36 DDD (DEGENERATIVE DISC DISEASE), LUMBAR: Chronic | ICD-10-CM

## 2020-03-18 LAB
ALBUMIN SERPL-MCNC: 4.1 G/DL (ref 3.5–5)
ALBUMIN/GLOB SERPL: 1.4 {RATIO} (ref 1.1–2.2)
ALP SERPL-CCNC: 60 U/L (ref 45–117)
ALT SERPL-CCNC: 22 U/L (ref 12–78)
ANION GAP SERPL CALC-SCNC: 6 MMOL/L (ref 5–15)
AST SERPL-CCNC: 16 U/L (ref 15–37)
BILIRUB SERPL-MCNC: 0.5 MG/DL (ref 0.2–1)
BUN SERPL-MCNC: 32 MG/DL (ref 6–20)
BUN/CREAT SERPL: 25 (ref 12–20)
CALCIUM SERPL-MCNC: 9.8 MG/DL (ref 8.5–10.1)
CHLORIDE SERPL-SCNC: 103 MMOL/L (ref 97–108)
CHOLEST SERPL-MCNC: 235 MG/DL
CO2 SERPL-SCNC: 28 MMOL/L (ref 21–32)
CREAT SERPL-MCNC: 1.27 MG/DL (ref 0.55–1.02)
GLOBULIN SER CALC-MCNC: 3 G/DL (ref 2–4)
GLUCOSE SERPL-MCNC: 85 MG/DL (ref 65–100)
HDLC SERPL-MCNC: 70 MG/DL
HDLC SERPL: 3.4 {RATIO} (ref 0–5)
HGB BLD-MCNC: 12.6 G/DL (ref 11.5–16)
LDLC SERPL CALC-MCNC: 145.8 MG/DL (ref 0–100)
LIPID PROFILE,FLP: ABNORMAL
POTASSIUM SERPL-SCNC: 4.9 MMOL/L (ref 3.5–5.1)
PROT SERPL-MCNC: 7.1 G/DL (ref 6.4–8.2)
SODIUM SERPL-SCNC: 137 MMOL/L (ref 136–145)
T4 FREE SERPL-MCNC: 1.2 NG/DL (ref 0.8–1.5)
TRIGL SERPL-MCNC: 96 MG/DL (ref ?–150)
VLDLC SERPL CALC-MCNC: 19.2 MG/DL

## 2020-03-18 RX ORDER — FLUTICASONE PROPIONATE 50 MCG
2 SPRAY, SUSPENSION (ML) NASAL DAILY
Qty: 1 BOTTLE | Refills: 2 | Status: SHIPPED | OUTPATIENT
Start: 2020-03-18 | End: 2020-07-16

## 2020-03-18 RX ORDER — TRAMADOL HYDROCHLORIDE 50 MG/1
50 TABLET ORAL
Qty: 28 TAB | Refills: 2 | Status: SHIPPED | OUTPATIENT
Start: 2020-03-18 | End: 2020-04-27

## 2020-03-18 RX ORDER — CEFUROXIME AXETIL 500 MG/1
500 TABLET ORAL 2 TIMES DAILY
Qty: 14 TAB | Refills: 0 | Status: SHIPPED | OUTPATIENT
Start: 2020-03-18 | End: 2020-04-01 | Stop reason: ALTCHOICE

## 2020-03-18 RX ORDER — GABAPENTIN 300 MG/1
600 CAPSULE ORAL
Qty: 60 CAP | Refills: 2 | Status: SHIPPED | OUTPATIENT
Start: 2020-03-18 | End: 2020-05-26

## 2020-03-18 RX ORDER — BUPROPION HYDROCHLORIDE 150 MG/1
150 TABLET ORAL
Qty: 30 TAB | Refills: 0 | Status: SHIPPED | OUTPATIENT
Start: 2020-03-18 | End: 2020-06-03 | Stop reason: SDUPTHER
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
## 2020-03-18 NOTE — TELEPHONE ENCOUNTER
Patient went to the pharmacy and was told that they cannot price Wellbutrin for her until the prescription is sent. Please send in to [facility].

## 2020-03-18 NOTE — PROGRESS NOTES
Chief Complaint   Patient presents with    Knee Pain     Left Knee     Body mass index is 25.13 kg/m². 1. Have you been to the ER, urgent care clinic since your last visit? Hospitalized since your last visit? No    2. Have you seen or consulted any other health care providers outside of the [address] [place] [address] since your last visit? Include any pap smears or colon screening.  No    Reviewed record in preparation for visit and have necessary documentation  Pt did not bring medication to office visit for review  Information was given to pt on Advanced Directives, Living Will  Information was given on Shingles Vaccine  Opportunity was given for questions  Goals that were addressed and/or need to be completed after this appointment include:     Health Maintenance Due   Topic Date Due    Shingrix Vaccine Age 50> (1 of 2) 04/13/2004    GLAUCOMA SCREENING Q2Y  04/13/2019    Colonoscopy  07/27/2019

## 2020-03-19 ENCOUNTER — TELEPHONE (OUTPATIENT)
Dept: FAMILY MEDICINE CLINIC | Age: 65
End: 2020-03-19

## 2020-03-19 RX ORDER — ROSUVASTATIN CALCIUM 10 MG/1
10 TABLET, COATED ORAL
Qty: 30 TAB | Refills: 2 | Status: SHIPPED | OUTPATIENT
Start: 2020-03-19 | End: 2020-05-14

## 2020-03-19 NOTE — TELEPHONE ENCOUNTER
I sent in generic Crestor for her at a low dose. Call us if she has muscle aches or other side effects and we will tell her what to do.   Thank you,  Dr. Vidal Self

## 2020-03-19 NOTE — TELEPHONE ENCOUNTER
Spoke with patient and advised her of the following per Dr. Ysabel Hartman:    \"1-Your calculated 10-year risk for cardiovascular events given your current cholesterol panel and other risk factors indicates high risk of 15.4 %or 9% above normal.  I recommend that you start a statin medication.     \"Statins\" are the most effective family of cholesterol lowering medications. However side effects of statins  can be muscle aches, fatigue, liver enzyme abnormalities and rashes.      You must understand the risks and agree to surveillence labs every 3-4months during the first year. You must report side effects promptly. In my experience 80% of my patients can tolerate these medications just fine.     If you agree to a statin trial please call and we will call in your prescription. Your first fasting lab will be due in two months.     2-Your kidney function has decreased. Please stop the meloxicam, stay well-hydrated see me back in 2 weeks to repeat this. You also need to avoid Motrin, ibuprofen, Aleve and Naprosyn. \"    She verbalized understanding and agreed to starting a statin medication.

## 2020-03-30 LAB — HEMOCCULT STL QL IA: NEGATIVE

## 2020-03-31 ENCOUNTER — TELEPHONE (OUTPATIENT)
Dept: FAMILY MEDICINE CLINIC | Age: 65
End: 2020-03-31

## 2020-03-31 NOTE — TELEPHONE ENCOUNTER
Spoke with patient and advised her of the following per Dr. Karen Cabral: \"Stool specimen negative for blood. \"  Patient verbalized understanding and says that her ear is still hurting her. She has finished her antibiotic and is using Flonase. Please advise.

## 2020-04-01 ENCOUNTER — OFFICE VISIT (OUTPATIENT)
Dept: FAMILY MEDICINE CLINIC | Age: 65
End: 2020-04-01

## 2020-04-01 VITALS
HEART RATE: 63 BPM | BODY MASS INDEX: 25.11 KG/M2 | SYSTOLIC BLOOD PRESSURE: 109 MMHG | DIASTOLIC BLOOD PRESSURE: 66 MMHG | RESPIRATION RATE: 16 BRPM | OXYGEN SATURATION: 99 % | WEIGHT: 133 LBS | HEIGHT: 61 IN | TEMPERATURE: 99 F

## 2020-04-01 DIAGNOSIS — H60.501 ACUTE OTITIS EXTERNA OF RIGHT EAR, UNSPECIFIED TYPE: Primary | ICD-10-CM

## 2020-04-01 RX ORDER — CIPROFLOXACIN AND DEXAMETHASONE 3; 1 MG/ML; MG/ML
4 SUSPENSION/ DROPS AURICULAR (OTIC) 2 TIMES DAILY
Qty: 7.5 ML | Refills: 0 | Status: SHIPPED | OUTPATIENT
Start: 2020-04-01 | End: 2020-04-01

## 2020-04-01 RX ORDER — NEOMYCIN SULFATE, POLYMYXIN B SULFATE AND HYDROCORTISONE 10; 3.5; 1 MG/ML; MG/ML; [USP'U]/ML
4 SUSPENSION/ DROPS AURICULAR (OTIC) 2 TIMES DAILY
Qty: 10 ML | Refills: 0 | Status: SHIPPED | OUTPATIENT
Start: 2020-04-01 | End: 2020-04-06
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
## 2020-04-01 NOTE — PROGRESS NOTES
Chief Complaint   Patient presents with    Ear Pain     Right      Body mass index is 25.13 kg/m². 1. Have you been to the ER, urgent care clinic since your last visit? Hospitalized since your last visit? No    2. Have you seen or consulted any other health care providers outside of the [address] [place] [address] since your last visit? Include any pap smears or colon screening.  No    Reviewed record in preparation for visit and have necessary documentation  Pt did not bring medication to office visit for review  Information was given to pt on Advanced Directives, Living Will  Information was given on Shingles Vaccine  Opportunity was given for questions  Goals that were addressed and/or need to be completed after this appointment include:     Health Maintenance Due   Topic Date Due    GLAUCOMA SCREENING Q2Y  04/13/2019    Colonoscopy  07/27/2019

## 2020-04-01 NOTE — PATIENT INSTRUCTIONS

## 2020-04-07 ENCOUNTER — HOSPITAL ENCOUNTER (OUTPATIENT)
Dept: LAB | Age: 65
Discharge: HOME | End: 2020-04-07

## 2020-04-07 ENCOUNTER — VIRTUAL VISIT (OUTPATIENT)
Dept: FAMILY MEDICINE CLINIC | Age: 65
End: 2020-04-07

## 2020-04-07 DIAGNOSIS — G89.29 CHRONIC PAIN OF LEFT KNEE: ICD-10-CM

## 2020-04-07 DIAGNOSIS — N28.9 RENAL DYSFUNCTION: Primary | ICD-10-CM

## 2020-04-07 DIAGNOSIS — I10 ESSENTIAL HYPERTENSION: ICD-10-CM

## 2020-04-07 DIAGNOSIS — M25.562 CHRONIC PAIN OF LEFT KNEE: ICD-10-CM

## 2020-04-07 DIAGNOSIS — N28.9 RENAL DYSFUNCTION: ICD-10-CM

## 2020-04-07 LAB
ALBUMIN SERPL-MCNC: 3.7 G/DL (ref 3.5–5)
ANION GAP SERPL CALC-SCNC: 4 MMOL/L (ref 5–15)
BUN SERPL-MCNC: 20 MG/DL (ref 6–20)
BUN/CREAT SERPL: 20 (ref 12–20)
CALCIUM SERPL-MCNC: 9.1 MG/DL (ref 8.5–10.1)
CHLORIDE SERPL-SCNC: 105 MMOL/L (ref 97–108)
CO2 SERPL-SCNC: 28 MMOL/L (ref 21–32)
CREAT SERPL-MCNC: 0.99 MG/DL (ref 0.55–1.02)
GLUCOSE SERPL-MCNC: 117 MG/DL (ref 65–100)
PHOSPHATE SERPL-MCNC: 3.1 MG/DL (ref 2.6–4.7)
POTASSIUM SERPL-SCNC: 4.6 MMOL/L (ref 3.5–5.1)
SODIUM SERPL-SCNC: 137 MMOL/L (ref 136–145)
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
## 2020-04-08 NOTE — PROGRESS NOTES
Please call patient. Tell her her kidney function is better but not completely normal.  Make sure she stays well-hydrated and we will recheck this in 2 months. Schedule appointment with me for 2 months.     Thank you,  Dr. David Reza

## 2020-05-12 NOTE — TELEPHONE ENCOUNTER
Patient walked into the office today complaining of left knee pain. She states that she has had an xray in the past and was told she may need to have a knee replacement but has been putting it off. She would like to know what you recommend her take for the pain? She has tried Aleve, Gabapentin, and Salone Pas and none of those medications have helped. Please advise. R2 Inpatient Psychiatry Consult Progress Note    Subjective:      CC: \"My anxiety is beyond high\"      Chart reviewed, patient utilized about 9 mg lorazepam PRN withdrawal symptoms past 24 hours.  Vitals appear to be WNL, CIWA scores typically 5-6 past 24 hours.  Patient seen and evaluated in room she is lying in bed in no acute distress, sitter is present at bedside.  She expresses her anxiety is very high states \"I feel like a caged animal in here, I am like a boiling pot of water about to explode\".  Endorses some thoughts of throwing things at other people and harming other people, amenable to verbal redirection states she will be able to tell someone if she wants to harm other people.  At this time endorses continued suicidal ideation and apathy however denies active suicidal intent/plan.  Also describes continued pain.  Reports intermittent sleep overnight reports her appetite is intact.  She perseverates on when she would be able to leave the hospital and why she needs inpatient psychiatric hospitalization, this writer answered questions as able.  Strongly encouraged her to limit her usage of lorazepam as it may prolong withdrawal symptoms and may keep her in hospital longer, she reports she needs a medication however acknowledges.    Objective:    Mental Status Exam  Appearance- Appears stated age, disheveled, scabs scattered over face   Behavior/Attitude- irritable, mostly cooperative. fair eye contact  Motor- No psychomotor agitation/retardation, no tics/tremors or other abnormal movements.   Speech- Regular rate and rhythm, normal tone, non-pressured.   Mood- \"so anxious\"  Affect- restricted range, non labile  Thought Process- Linear, logical, goal-directed.   Thought Content- report suicidal ideation however denies active intent/plan.   denies HI, paranoid or delusional ideation.   Perceptions - No evidence of response to internal stimuli. Denies, and no evidence of, AVH.   Insight-  Good  Judgment-Fair  General Cognition- A&Ox3, good concentration, memory intact. Good fund of knowledge. Good functional knowledge.     No results found for this or any previous visit (from the past 24 hour(s)).    Current Facility-Administered Medications   Medication Dose Route Frequency Provider Last Rate Last Dose   • venlafaxine XR (EFFEXOR XR) 24 hr capsule 75 mg  75 mg Oral Daily with breakfast Bj Shore MD       • gabapentin (NEURONTIN) capsule 600 mg  600 mg Oral Q8H Bj Shore MD   600 mg at 05/12/20 1415   • QUEtiapine (SEROquel) tablet 50 mg  50 mg Oral TID PRN Bj Shore MD       • ibuprofen (MOTRIN) tablet 800 mg  800 mg Oral Q6H Mariah Benavides, SOPHIA       • mirtazapine (REMERON) tablet 30 mg  30 mg Oral Nightly Bj Shore MD   30 mg at 05/11/20 2119   • QUEtiapine (SEROquel) tablet 100 mg  100 mg Oral Nightly Bj Shore MD   100 mg at 05/11/20 2119   • QUEtiapine (SEROquel) tablet 50 mg  50 mg Oral BID Bj Shore MD   50 mg at 05/12/20 1428   • morphine 10 MG/5ML solution 10 mg  10 mg Oral Q8H PRN Koffi Heck DO   10 mg at 05/12/20 1039   • doxycycline hyclate (VIBRAMYCIN) capsule 100 mg  100 mg Oral 2 times per day Estrella Ackerman MD   100 mg at 05/12/20 0915   • amoxicillin-clavulanate (AUGMENTIN) 875-125 MG tablet 1 tablet  1 tablet Oral 2 times per day Estrella Ackerman MD   1 tablet at 05/12/20 0915   • amLODIPine (NORVASC) tablet 5 mg  5 mg Oral Daily Alejandra Jaramillo MD   5 mg at 05/12/20 0915   • pantoprazole (PROTONIX) EC tablet 40 mg  40 mg Oral Nightly Alejandra Jaramillo MD   40 mg at 05/11/20 2119   • LORazepam (ATIVAN) tablet 1 mg  1 mg Oral Q1H PRN Angelita Arambula DO   1 mg at 05/12/20 1102   • clonazePAM (KlonoPIN) tablet 1 mg  1 mg Oral 3 times per day Angelita Arambula DO   1 mg at 05/12/20 1415   • LORazepam (ATIVAN) injection 2 mg  2 mg Intramuscular Q6H PRN Bj Shore MD   2 mg at 05/12/20 1210   • nicotine (NICODERM) 21 MG/24HR patch 1 patch  1 patch  Transdermal Daily Wallace Melendez DO   1 patch at 05/12/20 0930   • dicyclomine (BENTYL) capsule 20 mg  20 mg Oral 4x Daily PRN Wallace Melendez DO       • bismuth subsalicylate (PEPTO-BISMOL) chewable tablet 524 mg  524 mg Oral 4x Daily PRN Wallace Melendez DO       • simethicone (MYLICON) tablet 80 mg  80 mg Oral 4x Daily PRN Wallace Melendez DO       • sodium chloride 0.9 % flush bag 25 mL  25 mL Intravenous PRN Sheela Chamberlain MD   Stopped at 05/12/20 0327   • sodium chloride (PF) 0.9 % injection 2 mL  2 mL Intracatheter 2 times per day Sheela Chamberlain MD   2 mL at 05/12/20 0916   • enoxaparin (LOVENOX) injection 40 mg  40 mg Subcutaneous Daily Sheela Chamberlain MD   Stopped at 05/09/20 0900       Assessment:  Patient is a 35 year old female with history of opiate use, depression, anxiety, insomnia brought into the hospital after being found by police asleep on a park bench.  Admitted to using approximately $100 a day of heroin, 20 ounce a day of cocaine, and currently undergoing detox.  At the same time noted worsening depression and suicidal ideation which continues today.  She is amenable towards further detox and potential antidepressant initiation and voluntary psychiatric hospitalization once further detoxed.    Diagnosis:  Depressive disorder  Generalized anxiety disorder  Opiate use disorder  Insomnia    Plan:  -Continue 1: 1 sitter, patient will need inpatient psychiatric hospitalization once medically cleared. She is not able to leave [place] as father has acute safety concerns.  Per primary team, patient will have a wound VAC and will likely require transfer to outside psychiatric facility after medical clearance.  - Carina from [place] previously informed that patient will need a petition.  Certificate completed by this writer and placed in chart on 5/12/2020  - [employer] Hospitalist for medical management  - Continue clonidine 0.1 mg patch for 7 days total  -Continue gabapentin 400 mg  every 8 hours for anxiety.    - Continue as needed Maalox, Bentyl, Tylenol, for opiate detox purposes  -Continue quetiapine 50 mg twice daily, 100 mg nightly for anxiety and sleep  -Continue Remeron 30 mg nightly for sleep and depression  -Patient reported using about 4 Xanax bars daily, continue scheduled clonazepam 1 mg every 8 hours with additional lorazepam PRN withdrawal   -Seroquel 25mg TID PRN for anxiety  -Psychiatry will continue to follow    Discussed with attending, Dr. Silviano Dash, DO  Psychiatry PGY 2  5/12/2020

## 2020-06-03 ENCOUNTER — OFFICE VISIT (OUTPATIENT)
Dept: FAMILY MEDICINE CLINIC | Age: 65
End: 2020-06-03

## 2020-06-03 VITALS
SYSTOLIC BLOOD PRESSURE: 129 MMHG | TEMPERATURE: 98.6 F | OXYGEN SATURATION: 97 % | RESPIRATION RATE: 16 BRPM | HEIGHT: 61 IN | HEART RATE: 72 BPM | BODY MASS INDEX: 25.68 KG/M2 | DIASTOLIC BLOOD PRESSURE: 69 MMHG | WEIGHT: 136 LBS

## 2020-06-03 DIAGNOSIS — M81.0 AGE-RELATED OSTEOPOROSIS WITHOUT CURRENT PATHOLOGICAL FRACTURE: ICD-10-CM

## 2020-06-03 DIAGNOSIS — M17.32 POST-TRAUMATIC OSTEOARTHRITIS OF LEFT KNEE: ICD-10-CM

## 2020-06-03 DIAGNOSIS — E78.00 PURE HYPERCHOLESTEROLEMIA: ICD-10-CM

## 2020-06-03 DIAGNOSIS — N18.30 CHRONIC KIDNEY DISEASE, STAGE 3 (MODERATE): ICD-10-CM

## 2020-06-03 DIAGNOSIS — M51.36 DDD (DEGENERATIVE DISC DISEASE), LUMBAR: Chronic | ICD-10-CM

## 2020-06-03 DIAGNOSIS — I10 ESSENTIAL HYPERTENSION: Primary | ICD-10-CM

## 2020-06-03 DIAGNOSIS — M79.604 LEG PAIN, RIGHT: ICD-10-CM

## 2020-06-03 RX ORDER — BUPROPION HYDROCHLORIDE 150 MG/1
150 TABLET ORAL
Qty: 30 TAB | Refills: 0 | Status: SHIPPED | OUTPATIENT
Start: 2020-06-03 | End: 2020-07-16

## 2020-06-03 RX ORDER — ALENDRONATE SODIUM 70 MG/1
70 TABLET ORAL
Qty: 12 TAB | Refills: 3 | Status: SHIPPED | OUTPATIENT
Start: 2020-06-03 | End: 2021-05-10

## 2020-06-03 RX ORDER — TRAMADOL HYDROCHLORIDE 50 MG/1
100 TABLET ORAL
Qty: 28 TAB | Refills: 1 | Status: SHIPPED | OUTPATIENT
Start: 2020-06-03 | End: 2020-06-08

## 2020-06-03 RX ORDER — DICLOFENAC SODIUM 10 MG/G
2 GEL TOPICAL 4 TIMES DAILY
Qty: 100 G | Refills: 2 | Status: SHIPPED | OUTPATIENT
Start: 2020-06-03 | End: 2020-08-10

## 2020-06-03 RX ORDER — LISINOPRIL 5 MG/1
5 TABLET ORAL DAILY
Qty: 90 TAB | Refills: 2 | Status: SHIPPED | OUTPATIENT
Start: 2020-06-03 | End: 2020-07-16

## 2020-06-03 NOTE — PROGRESS NOTES
Chief Complaint   Patient presents with    Labs     Not fasting     Medication Refill     Body mass index is 25.7 kg/m². 1. Have you been to the ER, urgent care clinic since your last visit? Hospitalized since your last visit? No    2. Have you seen or consulted any other health care providers outside of the [address] [place] [address] since your last visit? Include any pap smears or colon screening.  No    Reviewed record in preparation for visit and have necessary documentation  Pt did not bring medication to office visit for review  Information was given to pt on Advanced Directives, Living Will  Information was given on Shingles Vaccine  Opportunity was given for questions  Goals that were addressed and/or need to be completed after this appointment include:     Health Maintenance Due   Topic Date Due    GLAUCOMA SCREENING Q2Y  04/13/2019    Medicare Yearly Exam  04/17/2019    Colonoscopy  07/27/2019
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
## 2020-06-03 NOTE — PATIENT INSTRUCTIONS
Learning About High Cholesterol What is high cholesterol? High cholesterol means that you have too much cholesterol in your blood. Cholesterol is a type of fat. It's needed for many body functions, such as making new cells. Cholesterol is made by your body. It also comes from food you eat. Having high cholesterol can lead to the buildup of plaque in artery walls. This can increase your risk of heart disease and stroke. When your doctor talks about high cholesterol levels, he or she is talking about your total cholesterol and LDL cholesterol (the \"bad\" cholesterol) levels. Your doctor may also speak about HDL (the \"good\" cholesterol) levels. High HDL is linked with a lower risk for heart disease, heart attack, and stroke. Your cholesterol levels help your doctor find out your risk for having a heart attack or stroke. How can you prevent high cholesterol? A heart-healthy lifestyle can help you prevent high cholesterol. This lifestyle helps lower your risk for a heart attack and stroke. · Eat heart-healthy foods. ? Eat fruits, vegetables, whole grains (like oatmeal), dried beans and peas, nuts and seeds, soy products (like tofu), and fat-free or low-fat dairy products. ? Replace butter, margarine, and hydrogenated or partially hydrogenated oils with olive and canola oils. (Canola oil margarine without trans fat is fine.) ? Replace red meat with fish, poultry, and soy protein (like tofu). ? Limit processed and packaged foods like chips, crackers, and cookies. · Be active. Exercise can improve your cholesterol level. Get at least 30 minutes of exercise on most days of the week. Walking is a good choice. You also may want to do other activities, such as running, swimming, cycling, or playing tennis or team sports. · Stay at a healthy weight. Lose weight if you need to. · Don't smoke.  If you need help quitting, talk to your doctor about stop-smoking programs and medicines. These can increase your chances of quitting for good. How is high cholesterol treated? The goal of treatment is to reduce your chances of having a heart attack or stroke. The goal is not to lower your cholesterol numbers only. · You may make lifestyle changes, such as eating healthy foods, not smoking, losing weight, and being more active. · You may have to take medicine. Follow-up care is a key part of your treatment and safety. Be sure to make and go to all appointments, and call your doctor if you are having problems. It's also a good idea to know your test results and keep a list of the medicines you take. Where can you learn more? Go to http://bea-aurora.info/ Enter F714 in the search box to learn more about \"Learning About High Cholesterol. \" Current as of: December 16, 2019               Content Version: 12.5 © 0710-6525 [employer]. Care instructions adapted under license by [employer] (which disclaims liability or warranty for this information). If you have questions about a medical condition or this instruction, always ask your healthcare professional. [address] any warranty or liability for your use of this information.

## 2020-07-01 NOTE — PROGRESS NOTES
Messaged Dr. Frances Araujo for ASA plan. MD Pierce Pettit RN             Stop her ASA 1 week before surgery. Thank you,   Dr. Frances Araujo    Previous Messages      ----- Message -----   From: Pierce Montesinos RN   Sent: 7/1/2020   4:33 PM EDT   To: Indra Mcknight MD   Subject: ASA plan                                         Good afternoon, Dr. Frances Araujo. Nayla is have a knee replacement w/Dr. Sarah Pascual on 7/23. Could you give me a recommendation for her ASA plan prior to surgery? We will reinforce your instructions when we see her in pre-admission testing on 7/16.      Thanks so much,   Mack Gonzales RN

## 2020-07-01 NOTE — PROGRESS NOTES
Contacted patient at this time regarding COVID testing prior to [facility] 7/22/20. Pt is scheduled to arrive at [facility] on 7/19/20, between . Patient verbalizes understanding that surgery will not proceed as planned if screening appointment is missed or if COVID test is positive.

## 2020-07-09 ENCOUNTER — HOSPITAL ENCOUNTER (OUTPATIENT)
Dept: LAB | Age: 65
Discharge: HOME | End: 2020-07-09

## 2020-07-09 DIAGNOSIS — E78.00 PURE HYPERCHOLESTEROLEMIA: ICD-10-CM

## 2020-07-09 DIAGNOSIS — N18.30 CHRONIC KIDNEY DISEASE, STAGE 3 (MODERATE): ICD-10-CM

## 2020-07-09 DIAGNOSIS — I10 ESSENTIAL HYPERTENSION: ICD-10-CM

## 2020-07-09 LAB
ALBUMIN SERPL-MCNC: 3.9 G/DL (ref 3.5–5)
ALT SERPL-CCNC: 22 U/L (ref 12–78)
ANION GAP SERPL CALC-SCNC: 7 MMOL/L (ref 5–15)
AST SERPL-CCNC: 18 U/L (ref 15–37)
BUN SERPL-MCNC: 18 MG/DL (ref 6–20)
BUN/CREAT SERPL: 18 (ref 12–20)
CALCIUM SERPL-MCNC: 9.4 MG/DL (ref 8.5–10.1)
CHLORIDE SERPL-SCNC: 104 MMOL/L (ref 97–108)
CHOLEST SERPL-MCNC: 134 MG/DL
CO2 SERPL-SCNC: 26 MMOL/L (ref 21–32)
CREAT SERPL-MCNC: 1 MG/DL (ref 0.55–1.02)
GLUCOSE SERPL-MCNC: 88 MG/DL (ref 65–100)
HDLC SERPL-MCNC: 66 MG/DL
HDLC SERPL: 2 {RATIO} (ref 0–5)
HGB BLD-MCNC: 13 G/DL (ref 11.5–16)
LDLC SERPL CALC-MCNC: 52 MG/DL (ref 0–100)
LIPID PROFILE,FLP: NORMAL
PHOSPHATE SERPL-MCNC: 3 MG/DL (ref 2.6–4.7)
POTASSIUM SERPL-SCNC: 4.6 MMOL/L (ref 3.5–5.1)
SODIUM SERPL-SCNC: 137 MMOL/L (ref 136–145)
TRIGL SERPL-MCNC: 80 MG/DL (ref ?–150)
VLDLC SERPL CALC-MCNC: 16 MG/DL

## 2020-07-10 ENCOUNTER — HOSPITAL ENCOUNTER (OUTPATIENT)
Dept: CT IMAGING | Age: 65
Discharge: HOME | End: 2020-07-10
Attending: ORTHOPAEDIC SURGERY
Payer: MEDICARE

## 2020-07-10 DIAGNOSIS — M17.12 OSTEOARTHRITIS OF LEFT KNEE: ICD-10-CM

## 2020-07-10 LAB
25(OH)D3 SERPL-MCNC: 89.9 NG/ML (ref 30–100)
CALCIUM SERPL-MCNC: 9.5 MG/DL (ref 8.5–10.1)
PTH-INTACT SERPL-MCNC: 26.3 PG/ML (ref 18.4–88)

## 2020-07-10 PROCEDURE — 73700 CT LOWER EXTREMITY W/O DYE: CPT

## 2020-07-15 ENCOUNTER — VIRTUAL VISIT (OUTPATIENT)
Dept: FAMILY MEDICINE CLINIC | Age: 65
End: 2020-07-15

## 2020-07-15 DIAGNOSIS — Z00.00 MEDICARE ANNUAL WELLNESS VISIT, SUBSEQUENT: Primary | ICD-10-CM
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
## 2020-07-16 ENCOUNTER — HOSPITAL ENCOUNTER (OUTPATIENT)
Dept: PREADMISSION TESTING | Age: 65
Discharge: HOME | End: 2020-07-16
Payer: MEDICARE

## 2020-07-16 VITALS
WEIGHT: 130.95 LBS | DIASTOLIC BLOOD PRESSURE: 63 MMHG | HEART RATE: 61 BPM | SYSTOLIC BLOOD PRESSURE: 136 MMHG | TEMPERATURE: 99 F | OXYGEN SATURATION: 99 % | BODY MASS INDEX: 24.1 KG/M2 | RESPIRATION RATE: 20 BRPM | HEIGHT: 62 IN

## 2020-07-16 LAB
ABO + RH BLD: NORMAL
ALBUMIN SERPL-MCNC: 4.1 G/DL (ref 3.5–5)
ALBUMIN/GLOB SERPL: 1.3 {RATIO} (ref 1.1–2.2)
ALP SERPL-CCNC: 64 U/L (ref 45–117)
ALT SERPL-CCNC: 25 U/L (ref 12–78)
ANION GAP SERPL CALC-SCNC: 6 MMOL/L (ref 5–15)
APPEARANCE UR: CLEAR
AST SERPL-CCNC: 21 U/L (ref 15–37)
BACTERIA URNS QL MICRO: NEGATIVE /HPF
BASOPHILS # BLD: 0.1 K/UL (ref 0–0.1)
BASOPHILS NFR BLD: 1 % (ref 0–1)
BILIRUB SERPL-MCNC: 0.6 MG/DL (ref 0.2–1)
BILIRUB UR QL: NEGATIVE
BLOOD GROUP ANTIBODIES SERPL: NORMAL
BUN SERPL-MCNC: 19 MG/DL (ref 6–20)
BUN/CREAT SERPL: 20 (ref 12–20)
CALCIUM SERPL-MCNC: 8.9 MG/DL (ref 8.5–10.1)
CHLORIDE SERPL-SCNC: 104 MMOL/L (ref 97–108)
CO2 SERPL-SCNC: 27 MMOL/L (ref 21–32)
COLOR UR: NORMAL
CREAT SERPL-MCNC: 0.94 MG/DL (ref 0.55–1.02)
CRP SERPL-MCNC: <0.29 MG/DL (ref 0–0.6)
DIFFERENTIAL METHOD BLD: NORMAL
EOSINOPHIL # BLD: 0.2 K/UL (ref 0–0.4)
EOSINOPHIL NFR BLD: 2 % (ref 0–7)
EPITH CASTS URNS QL MICRO: NORMAL /LPF
ERYTHROCYTE [DISTWIDTH] IN BLOOD BY AUTOMATED COUNT: 12.9 % (ref 11.5–14.5)
ERYTHROCYTE [SEDIMENTATION RATE] IN BLOOD: 6 MM/HR (ref 0–30)
EST. AVERAGE GLUCOSE BLD GHB EST-MCNC: 117 MG/DL
GLOBULIN SER CALC-MCNC: 3.2 G/DL (ref 2–4)
GLUCOSE SERPL-MCNC: 87 MG/DL (ref 65–100)
GLUCOSE UR STRIP.AUTO-MCNC: NEGATIVE MG/DL
HBA1C MFR BLD: 5.7 % (ref 4–5.6)
HCT VFR BLD AUTO: 41.5 % (ref 35–47)
HGB BLD-MCNC: 13.6 G/DL (ref 11.5–16)
HGB UR QL STRIP: NEGATIVE
HYALINE CASTS URNS QL MICRO: NORMAL /LPF (ref 0–5)
IMM GRANULOCYTES # BLD AUTO: 0 K/UL (ref 0–0.04)
IMM GRANULOCYTES NFR BLD AUTO: 0 % (ref 0–0.5)
KETONES UR QL STRIP.AUTO: NEGATIVE MG/DL
LEUKOCYTE ESTERASE UR QL STRIP.AUTO: NEGATIVE
LYMPHOCYTES # BLD: 3.5 K/UL (ref 0.8–3.5)
LYMPHOCYTES NFR BLD: 38 % (ref 12–49)
MCH RBC QN AUTO: 30.9 PG (ref 26–34)
MCHC RBC AUTO-ENTMCNC: 32.8 G/DL (ref 30–36.5)
MCV RBC AUTO: 94.3 FL (ref 80–99)
MONOCYTES # BLD: 0.6 K/UL (ref 0–1)
MONOCYTES NFR BLD: 7 % (ref 5–13)
NEUTS SEG # BLD: 4.8 K/UL (ref 1.8–8)
NEUTS SEG NFR BLD: 52 % (ref 32–75)
NITRITE UR QL STRIP.AUTO: NEGATIVE
NRBC # BLD: 0 K/UL (ref 0–0.01)
NRBC BLD-RTO: 0 PER 100 WBC
PH UR STRIP: 6 [PH] (ref 5–8)
PLATELET # BLD AUTO: 251 K/UL (ref 150–400)
PMV BLD AUTO: 10.2 FL (ref 8.9–12.9)
POTASSIUM SERPL-SCNC: 4.4 MMOL/L (ref 3.5–5.1)
PROT SERPL-MCNC: 7.3 G/DL (ref 6.4–8.2)
PROT UR STRIP-MCNC: NEGATIVE MG/DL
RBC # BLD AUTO: 4.4 M/UL (ref 3.8–5.2)
RBC #/AREA URNS HPF: NORMAL /HPF (ref 0–5)
SODIUM SERPL-SCNC: 137 MMOL/L (ref 136–145)
SP GR UR REFRACTOMETRY: 1.01 (ref 1–1.03)
SPECIMEN EXP DATE BLD: NORMAL
UA: UC IF INDICATED,UAUC: NORMAL
UROBILINOGEN UR QL STRIP.AUTO: 0.2 EU/DL (ref 0.2–1)
WBC # BLD AUTO: 9.2 K/UL (ref 3.6–11)
WBC URNS QL MICRO: NORMAL /HPF (ref 0–4)

## 2020-07-16 PROCEDURE — 36415 COLL VENOUS BLD VENIPUNCTURE: CPT

## 2020-07-16 PROCEDURE — 85652 RBC SED RATE AUTOMATED: CPT

## 2020-07-16 PROCEDURE — 85025 COMPLETE CBC W/AUTO DIFF WBC: CPT

## 2020-07-16 PROCEDURE — 86140 C-REACTIVE PROTEIN: CPT

## 2020-07-16 PROCEDURE — 86900 BLOOD TYPING SEROLOGIC ABO: CPT

## 2020-07-16 PROCEDURE — 83036 HEMOGLOBIN GLYCOSYLATED A1C: CPT

## 2020-07-16 PROCEDURE — 93005 ELECTROCARDIOGRAM TRACING: CPT

## 2020-07-16 PROCEDURE — 81001 URINALYSIS AUTO W/SCOPE: CPT

## 2020-07-16 PROCEDURE — 80053 COMPREHEN METABOLIC PANEL: CPT

## 2020-07-16 RX ORDER — LISINOPRIL 5 MG/1
5 TABLET ORAL
COMMUNITY
End: 2021-01-13

## 2020-07-16 NOTE — PERIOP NOTES
[address] [address] [facility]   MAIN OR                                  [phone]   MAIN PRE OP                          [phone]                                                                                AMBULATORY PRE OP          [phone]  PRE-ADMISSION TESTING    [phone]   Surgery Date:  7/23/2020       Is surgery arrival time given by surgeon? NO  If NO, [facility] staff will call you between 3 and 7pm the day before your surgery with your arrival time. (If your surgery is on a Monday, we will call you the Friday before.)    Call [phone] after 7pm Monday-Friday if you did not receive this call. INSTRUCTIONS BEFORE YOUR SURGERY   When You  Arrive Arrive at the [address] [facility] on the day of your surgery  Have your insurance card, photo ID, and any copayment (if needed)   Food   and   Drink NO food or drink after midnight the night before surgery    This means NO water, gum, mints, coffee, juice, etc.  No alcohol (beer, wine, liquor) 24 hours before and after surgery   Medications to   TAKE   Morning of Surgery MEDICATIONS TO TAKE THE MORNING OF SURGERY WITH A SIP OF WATER:    Tylenol if needed   Medications  To  STOP      7 days before surgery  Non-Steroidal anti-inflammatory Drugs (NSAID's): for example, Ibuprofen (Advil, Motrin), Naproxen (Aleve)   Aspirin, if taking for pain    Herbal supplements, vitamins, and fish oil   Other:  (Pain medications not listed above, including Tylenol may be taken)   Blood  Thinners  If you take  Aspirin, Plavix, Coumadin, or any blood-thinning or anti-blood clot medicine, talk to the doctor who prescribed the medications for pre-operative instructions. Stop Aspirin 7 days before surgery as per Dr Cindy Murphy.    Bathing Clothing  Jewelry  Valuables      If you shower the morning of surgery, please do not apply anything to your skin (lotions, powders, deodorant, or makeup, especially mascara)   Follow Chlorhexidine Care Fusion body wash instructions provided to you during PAT appointment. Begin 3 days prior to surgery.  Do not shave or trim anywhere 24 hours before surgery   Wear your hair loose or down; no pony-tails, buns, or metal hair clips   Wear loose, comfortable, clean clothes   Wear glasses instead of contacts   Leave money, valuables, and jewelry, including body piercings, at home   Going Home - or Spending the Night  SAME-DAY SURGERY: You must have a responsible adult drive you home and stay with you 24 hours after surgery   ADMITS: If your doctor is keeping you in the hospital after surgery, leave personal belongings/luggage in your car until you have a hospital room number. Hospital discharge time is 12 noon  Drivers must be here before 12 noon unless you are told differently   Special Instructions   Special Instructions:  · Use Chlorhexidine Care Fusion wash and sponges 3 days prior to surgery as instructed. · Incentive spirometer given with instructions to practice at home and bring back to the hospital on the day of surgery. · [facility] will contact you if your Hemoglobin A1C is greater than 7.5. · Ensure/Glucerna  sample, nutritional information, and Ensure/Glucerna coupon given. · Pain pamphlet and Call Don't Fall reminder reviewed with patient. · Bring PTA Medication list day of surgery with the last doses taken documented    It is now mandated that all surgical patients be tested for COVID-19 prior to surgery. Testing has to be exactly 4 days prior to surgery. Your COVID test date is 7/19/2020 between 8:00 am and 11:00 am.       COVID testing will be performed curbside at the [facility] [address]. There will be signs leading you to the testing site. You will need to bring a photo ID with you to be swabbed.        Patients are advised to self-quarantine at home after testing and prior to your surgery date. You will be notified if your results are positive. What to watch for:   Coronavirus (COVID-19) affects different people in different ways   It also appears with a wide range of symptoms from mild to severe   Signs usually appear 2-14 days after exposure     If you develop any of the following, notify your doctor immediately:  o Fever  o Chills, with or without a shiver  o Muscle pain  o Headache  o Sore throat  o Dry cough  o New loss of taste or smell  o Tiredness      If you develop any of the following, call 911:  o Shortness of breath  o Difficulty breathing  o Chest pain  o New confusion  o Blueness of fingers and/or lips     Follow all instructions so your surgery wont be cancelled. Please, be on time. If a situation occurs and you are delayed the day of surgery, call [phone]. If your physical condition changes (like a fever, cold, flu, etc.) call your surgeon. Home medication(s) reviewed and verified via LIST during PAT appointment. The patient was contacted by IN-PERSON  The patient verbalizes understanding of all instructions and  DOES NOT   need reinforcement.

## 2020-07-17 LAB
ATRIAL RATE: 53 BPM
BACTERIA SPEC CULT: NORMAL
BACTERIA SPEC CULT: NORMAL
CALCULATED P AXIS, ECG09: 62 DEGREES
CALCULATED R AXIS, ECG10: 7 DEGREES
CALCULATED T AXIS, ECG11: 55 DEGREES
DIAGNOSIS, 93000: NORMAL
P-R INTERVAL, ECG05: 150 MS
Q-T INTERVAL, ECG07: 436 MS
QRS DURATION, ECG06: 68 MS
QTC CALCULATION (BEZET), ECG08: 409 MS
SERVICE CMNT-IMP: NORMAL
VENTRICULAR RATE, ECG03: 53 BPM

## 2020-07-17 NOTE — PROGRESS NOTES
07/17/20  Patient saw her PCP for a virtual visit on 7/15/20 which is in [address] [place] [address]. I have listened to her heart and lungs in accordance with H/P. Lungs: Clear to auscultation bilaterally. Heart: Regular rate and rhythm, S1, S2 normal. No murmur, click, rub or gallop. Labs reviewed. EKG and MRSA pending at this time.      07/19/20  MRSA negative  EKG WNL

## 2020-07-19 ENCOUNTER — HOSPITAL ENCOUNTER (OUTPATIENT)
Dept: PREADMISSION TESTING | Age: 65
Discharge: HOME | End: 2020-07-19
Payer: MEDICARE

## 2020-07-19 PROCEDURE — 87635 SARS-COV-2 COVID-19 AMP PRB: CPT

## 2020-07-20 LAB — SARS-COV-2, COV2NT: NOT DETECTED

## 2020-07-21 NOTE — DISCHARGE INSTRUCTIONS
TOTAL KNEE DISCHARGE INSTRUCTIONS      Patient: Anuradha Abarca MRN: [id number]  SSN: [id number]              Please take the time to review the following instructions before you leave the hospital and use them as guidelines during your recovery from surgery. If you have any questions you may contact my office at [phone]  After business hours or during the weekend you can contact me through [address] or text / call at [phone] (cell phone) for emergency's. Please use the office number during regular business hours. SPECIAL INSTRUCTIONS :   1. Full extension at the knee is the most important aspect of your range of motion. Avoid placing a pillow or bump behind the knee. Rather, place the heel up on a bump or pillow and allow gravity to help straighten the knee. 2. You may weight bear as tolerated on the knee and during the day you should bend the knee as much as possible. 3. Drainage from the incision more than 4 days from surgery is concerning. Contact my office if there is any question [phone].   4. You may contact me directly through [employer] if there are specific questions or text / call using my cell number [phone]. DRESSING :     Post-op Dressings : This should be removed by physical therapy or you may remove this yourself 7 days after the date of your surgery. If there is no drainage, then a simple dressing may be used or no dressing at all. Other dressing options can be purchased over the counter at a local pharmacy or medical supply vendor. A porous adhesive dressing such as pictured above can be purchased online ([employer]) or at your local [facility] or [facility]. You only need to keep the incision covered for 7 days after showers. A dressing may be used for longer if there are issues with clothing clinging to the incision. Showering/ Bathing: You may shower with the Post-op dressing in place.  This is left in place for 7 days following discharge from the hospital. If your incision is dry without drainage you may shower following your discharge home. After 7 days your dressing should be removed for showering. It is fine to have water run over the incision. Do not vigorously scrub your incision. Apply a clean, dry dressing after you have dried your incision. Do not take a bath or get into a swimming pool / [place] until you follow up with Dr. Ernie Thomas. Do not soak your incision under water. If there is continued drainage or you are concerned contact Dr Erinn Momin office prior to showering [phone] ext [phone] . Diet:  You may advance to your regular diet as tolerated. Increase your clear liquid intake for the next 2-3 days. Medication:      1. You will be given prescriptions for pain medication when you are discharged from the hospital. The side effects of these medications can be substantial and the narcotic medications are not mandatory. You may substitute these medications with Tylenol or Alleve / Motrin. 2. Please use the medications as prescribed. Pain medications may cause constipation- Colace twice daily and Miralax one scoop daily while taking the narcotic medication should help prevent constipation. Please discuss with your local pharmacist regarding increasing this dosage if constipation persists. Other possible side effects of pain medication are dizziness, headache, nausea, vomiting, and urinary retention. Discontinue the pain medication if you develop itching, rash, shortness of breath, or difficulties swallowing. If these symptoms become severe or are not relieved by discontinuing the medication, you should seek immediate medical attention. 3. Refills of pain medication are authorized during office hours only (8 AM- 5 PM  Monday thru Friday). Many of these medication will require you or a family member to pick-up a physical prescription at the office.    4. Medications other than antiinflammatories will not be called into the pharmacy after business hours. 5. You may resume the medication(s) you were taking prior to your surgery. Narcotics may change the effects of some antidepressant medication(s). If you have any questions about possible interactions between your regular medications and the pain medication, you should ask the pharmacist or contact the prescribing physician. 6. If you have constipation which is not improved by oral stool softeners then a Ducolax suppository should be purchased over the counter. 7. Continue the blood thinner (Aspirin or Lovenox) for a total of 30 days following surgery. Follow up appointment:    Please call our office at [phone] for your follow up appointment. This should be scheduled 14 days following the date of surgery. You should also have a scheduled appointment for physical therapy following surgery. Physical Therapy / Nursing:    Physical Therapy following surgery will be arranged as an outpatient or at home. They have specific instructions for rehab and wound care. It is fine to have physical therapy remove your dressing at 7 days following surgery. Returning to work:    Normal return to work is 6-12 weeks following surgery. Depending on your progression following surgery and specific job duties you may take longer for a full return to work. DRIVING    You should not return to driving until you are off all opioid pain medications and able to safely and quickly apply the brakes. This is normally 2-6 weeks for left sided surgery and 2-8 weeks for right sided surgery. Important Signs and Symptoms:    If any of the following signs or symptoms occur, you should contact Dr. Marily Narayanan office.   Please be advised if a problem arises which you feel requires immediate medical attention or you are unable to contact Dr. Marily Narayanan office you should seek immediate medical attention at the ER or other health care facility you have access to.    1. A sudden increase in swelling and/or redness or warmth at the area your surgery was performed which isnt relieved by rest, ice, and elevation. 2. Oral temperature greater than 101 degrees for 12 hours or more which isnt relieved by an increase in fluid intake and taking 2 Tylenol every 4-6 hours. 3. Excessive drainage from your incisions, or drainage which hasnt stopped by 72 hours after your surgery. 4. Fever, chills, shortness of breath, chest pain, nausea, vomiting or other signs and symptoms which are of concern to you. YOUR TOTAL JOINT REPLACEMENT  FREQUENTLY ASKED QUESTIONS   What should I take for pain?  o You will be discharged with four medications for pain (Oxycodone, Tramadol, Ibuprofen and Tylenol). These may vary slightly depending on what you were taking in the hospital.   - 1st Line - Tylenol Arthritis Strength - 325-650 mg every 4 hours (scheduled for the 1st 24 hours)  - 2nd Line -  Ibuprofen 400 (2 tabs) - 800 (4 tabs) mg every 8 hours  (scheduled for the 1st 24 hours)  - 3rd Line - Oxycodone 5 mg (1-2 tablets every 4-6 hrs)  - 4th Line - Tramadol 50 mg (1-2 tablets every 4-6 hours) - take these between Oxycodone doses if your pain is not alleviated.  When should I call for advice regarding my pain?  o If your pain is still uncontrolled after being on the regimen above for at least 12 hours, please call the office [phone] or text / call my cell after hours [phone].  Can I get refills?  o Opioid refills are provided for the first 2-6 weeks following surgery. o Use Tylenol 500 mg along with Aleve 220mg twice daily or Motrin 200-800mg every 4-6 hours during the daytime hours after two weeks. - After two weeks, I suggest the opioid pain medications be used only 1 hour prior to your physical therapy appointment and 1 hour before sleeping at night. Use Tylenol and Ibuprofen at other times during the day.    - Keep in mind that you will need to discontinue opioids before you resume driving.  Is swelling normal?  o Almost everyone has some degree of swelling following surgery. o Following hip and knee replacement surgery, swelling can be normal below the incision for the first few weeks. - This swelling peaks around 5-7 days after surgery. - It is not unusual to have some bruising about the back of the thigh, calf, ankle, and foot.  What should I do for the swelling?  o Keep the limb elevated above the level of your heart - 'Toes above Nose'. o Apply compression socks (knee high for total knees and up to the mid-thigh for total hips). o Use the ice packs that you are discharged home with several times a day for the first several weeks.  How long should I remain on blood thinners following surgery? o 30 days   Which blood thinners will I be on? Can I take them with Tylenol?  o  Aspirin 81 mg twice daily - these should be taken with meals and can be used with Tylenol. In certain instances, you may be sent home on Lovenox for 30 days. o For short periods of time (30 days), aspirin and anti-inflammatories (i.e. Aleve, Motrin / Advil / ibuprofen, diclofenac, etc. can be taken together).  When can I drive?  o Once you have stopped using regular narcotic pain medications (Oxycodone, Percocet, Lortab, Norco etc.) and can safely apply the brakes without hesitation, (emergency braking).  When can I shower?  o You may shower immediately if your Optifoam bandage is dry and without discharge. The Optifoam dressing should be removed 7 days following surgery, after which you may continue to shower.  o No submersion of the incision, bathing or swimming for 14 days following surgery or until cleared by Dr Kriss St.  Can I remove this dressing?  o Yes, this is removed just like removing a band aid.  o If you are concerned, this can be removed by your therapist.   [facility] What do I do with the dressing when I shower?  o The Optifoam dressing is waterproof and you may shower with it. o The incision is sealed with Dermabond, a biologic skin glue, which also serves as a watertight seal. If your incision is draining, it is no longer considered to be watertight - you should contact our office prior to showering if you experience any drainage.  Which dressing should I purchase after I remove my Optifoam?  o An occlusive dressing which covers your entire incision. This does not have to be waterproof, but will need to be removed when you shower and then replaced. (Example Only)   How active should I be following surgery? o Progress activities in moderation and at your own pace.   o Walking room to room in your house is encouraged. o Walk each day and set progressive goals with small increments (1st week - ?block of walking, 2nd week - 1 block, 3rd week - 2 blocks, etc.)   Will I need help at home?  o You will likely need a caretaker who should be available for the first week following surgery. It is fine for family members to work during the day, as long as they are available by phone. o Planning ahead makes coming home from the hospital a much easier transition.  How long will my surgery take?  o On average, total joint replacement takes approximately 1-2 hour.   o The entire process, including pre-op and post-op care can last as long as 4- 5 hours before you are transferred to your room. o stroke, pulmonary embolism (a clot going from the legs to the lungs), and even death with surgery.  Will I be given antibiotics? Will I need antibiotics at discharge?  o Antibiotics will be given to you both before and after your procedure. To further minimize the risk of infection, we have streamlined the surgical procedure to take less time in the operating room.    o You do not require antibiotics following surgery.       Please do not hesitate to contact me through [employer] or by text / call me at [phone] (cell phone) for questions following surgery - MD Katharine Winchester, MD  Cell [phone]  Dustin Bear PA-C  Cell [phone]  Medical Assistants: [address] [place], [place] [address] [phone]

## 2020-07-22 ENCOUNTER — ANESTHESIA EVENT (OUTPATIENT)
Dept: SURGERY | Age: 65
End: 2020-07-22
Payer: MEDICARE

## 2020-07-23 ENCOUNTER — ANESTHESIA (OUTPATIENT)
Dept: SURGERY | Age: 65
End: 2020-07-23
Payer: MEDICARE

## 2020-07-23 ENCOUNTER — APPOINTMENT (OUTPATIENT)
Dept: GENERAL RADIOLOGY | Age: 65
End: 2020-07-23
Attending: PHYSICIAN ASSISTANT
Payer: MEDICARE

## 2020-07-23 ENCOUNTER — HOSPITAL ENCOUNTER (OUTPATIENT)
Age: 65
Setting detail: OBSERVATION
Discharge: HOME | End: 2020-07-24
Attending: ORTHOPAEDIC SURGERY | Admitting: ORTHOPAEDIC SURGERY
Payer: MEDICARE

## 2020-07-23 DIAGNOSIS — M17.12 PRIMARY OSTEOARTHRITIS OF LEFT KNEE: Primary | ICD-10-CM

## 2020-07-23 PROCEDURE — 77030040361 HC SLV COMPR DVT MDII -B

## 2020-07-23 PROCEDURE — 77030020263 HC SOL INJ SOD CL0.9% LFCR 1000ML: Performed by: ORTHOPAEDIC SURGERY

## 2020-07-23 PROCEDURE — 77030002933 HC SUT MCRYL J&J -A: Performed by: ORTHOPAEDIC SURGERY

## 2020-07-23 PROCEDURE — 74011250636 HC RX REV CODE- 250/636: Performed by: ANESTHESIOLOGY

## 2020-07-23 PROCEDURE — 74011250636 HC RX REV CODE- 250/636: Performed by: PHYSICIAN ASSISTANT

## 2020-07-23 PROCEDURE — G0378 HOSPITAL OBSERVATION PER HR: HCPCS

## 2020-07-23 PROCEDURE — 76060000064 HC AMB SURG ANES 2 TO 2.5 HR: Performed by: ORTHOPAEDIC SURGERY

## 2020-07-23 PROCEDURE — 77030007866 HC KT SPN ANES BBMI -B

## 2020-07-23 PROCEDURE — 74011000250 HC RX REV CODE- 250

## 2020-07-23 PROCEDURE — 64445 NJX AA&/STRD SCIATIC NRV IMG: CPT

## 2020-07-23 PROCEDURE — 77030031139 HC SUT VCRL2 J&J -A: Performed by: ORTHOPAEDIC SURGERY

## 2020-07-23 PROCEDURE — 77030028907 HC WRP KNEE WO BGS SOLM -B

## 2020-07-23 PROCEDURE — 77030006835 HC BLD SAW SAG STRY -B: Performed by: ORTHOPAEDIC SURGERY

## 2020-07-23 PROCEDURE — 77030000032 HC CUF TRNQT ZIMM -B: Performed by: ORTHOPAEDIC SURGERY

## 2020-07-23 PROCEDURE — 74011000258 HC RX REV CODE- 258: Performed by: NURSE ANESTHETIST, CERTIFIED REGISTERED

## 2020-07-23 PROCEDURE — 64447 NJX AA&/STRD FEMORAL NRV IMG: CPT

## 2020-07-23 PROCEDURE — 77030018673: Performed by: ORTHOPAEDIC SURGERY

## 2020-07-23 PROCEDURE — 77030018723 HC ELCTRD BLD COVD -A: Performed by: ORTHOPAEDIC SURGERY

## 2020-07-23 PROCEDURE — 77030041680 HC PNCL ELECSURG SMK EVAC CNMD -B: Performed by: ORTHOPAEDIC SURGERY

## 2020-07-23 PROCEDURE — 74011000250 HC RX REV CODE- 250: Performed by: ORTHOPAEDIC SURGERY

## 2020-07-23 PROCEDURE — 99218 HC RM OBSERVATION: CPT

## 2020-07-23 PROCEDURE — 74011250637 HC RX REV CODE- 250/637: Performed by: PHYSICIAN ASSISTANT

## 2020-07-23 PROCEDURE — 76030000021 HC AMB SURG 2 TO 2.5 HR INTENSV-TIER 1: Performed by: ORTHOPAEDIC SURGERY

## 2020-07-23 PROCEDURE — C1776 JOINT DEVICE (IMPLANTABLE): HCPCS | Performed by: ORTHOPAEDIC SURGERY

## 2020-07-23 PROCEDURE — 77030003601 HC NDL NRV BLK BBMI -A

## 2020-07-23 PROCEDURE — 77030041075 HC DRSG AG OPTIFRM MDII -B: Performed by: ORTHOPAEDIC SURGERY

## 2020-07-23 PROCEDURE — 97116 GAIT TRAINING THERAPY: CPT

## 2020-07-23 PROCEDURE — 97161 PT EVAL LOW COMPLEX 20 MIN: CPT

## 2020-07-23 PROCEDURE — 77030038149 HC BLD SAW SAG STRY -D: Performed by: ORTHOPAEDIC SURGERY

## 2020-07-23 PROCEDURE — 77030029820: Performed by: ORTHOPAEDIC SURGERY

## 2020-07-23 PROCEDURE — 77030018836 HC SOL IRR NACL ICUM -A: Performed by: ORTHOPAEDIC SURGERY

## 2020-07-23 PROCEDURE — 77030010507 HC ADH SKN DERMBND J&J -B: Performed by: ORTHOPAEDIC SURGERY

## 2020-07-23 PROCEDURE — 74011000250 HC RX REV CODE- 250: Performed by: NURSE ANESTHETIST, CERTIFIED REGISTERED

## 2020-07-23 PROCEDURE — 74011250637 HC RX REV CODE- 250/637: Performed by: ANESTHESIOLOGY

## 2020-07-23 PROCEDURE — 73560 X-RAY EXAM OF KNEE 1 OR 2: CPT

## 2020-07-23 PROCEDURE — 77030013708 HC HNDPC SUC IRR PULS STRY –B: Performed by: ORTHOPAEDIC SURGERY

## 2020-07-23 PROCEDURE — 74011000250 HC RX REV CODE- 250: Performed by: PHYSICIAN ASSISTANT

## 2020-07-23 PROCEDURE — 77030011640 HC PAD GRND REM COVD -A: Performed by: ORTHOPAEDIC SURGERY

## 2020-07-23 PROCEDURE — 77030029828 HC FEM TIB CKPNT KT DISP STRY -B: Performed by: ORTHOPAEDIC SURGERY

## 2020-07-23 PROCEDURE — 74011250636 HC RX REV CODE- 250/636

## 2020-07-23 PROCEDURE — 76210000035 HC AMBSU PH I REC 1 TO 1.5 HR: Performed by: ORTHOPAEDIC SURGERY

## 2020-07-23 PROCEDURE — 77030035236 HC SUT PDS STRATFX BARB J&J -B: Performed by: ORTHOPAEDIC SURGERY

## 2020-07-23 PROCEDURE — 74011250636 HC RX REV CODE- 250/636: Performed by: NURSE ANESTHETIST, CERTIFIED REGISTERED

## 2020-07-23 DEVICE — CRUCIATE RETAINING FEMORAL
Type: IMPLANTABLE DEVICE | Site: KNEE | Status: FUNCTIONAL
Brand: TRIATHLON

## 2020-07-23 DEVICE — KNEE K2 TOT HEMI ADV CMTLS -- IMPL CAPPED K2: Type: IMPLANTABLE DEVICE | Status: FUNCTIONAL

## 2020-07-23 DEVICE — PATELLA
Type: IMPLANTABLE DEVICE | Site: KNEE | Status: FUNCTIONAL
Brand: TRIATHLON

## 2020-07-23 DEVICE — TIBIAL BEARING INSERT - CR
Type: IMPLANTABLE DEVICE | Site: KNEE | Status: FUNCTIONAL
Brand: TRIATHLON

## 2020-07-23 DEVICE — TIBIAL COMPONENT
Type: IMPLANTABLE DEVICE | Site: KNEE | Status: FUNCTIONAL
Brand: TRIATHLON

## 2020-07-23 RX ORDER — BUPIVACAINE HYDROCHLORIDE 5 MG/ML
INJECTION, SOLUTION EPIDURAL; INTRACAUDAL AS NEEDED
Status: DISCONTINUED | OUTPATIENT
Start: 2020-07-23 | End: 2020-07-23 | Stop reason: HOSPADM

## 2020-07-23 RX ORDER — GABAPENTIN 300 MG/1
300 CAPSULE ORAL
Status: DISCONTINUED | OUTPATIENT
Start: 2020-07-23 | End: 2020-07-23 | Stop reason: SDUPTHER

## 2020-07-23 RX ORDER — SODIUM CHLORIDE 0.9 % (FLUSH) 0.9 %
5-40 SYRINGE (ML) INJECTION EVERY 8 HOURS
Status: DISCONTINUED | OUTPATIENT
Start: 2020-07-23 | End: 2020-07-24 | Stop reason: HOSPADM

## 2020-07-23 RX ORDER — ASPIRIN 81 MG/1
81 TABLET ORAL 2 TIMES DAILY
Qty: 60 TAB | Refills: 0 | Status: SHIPPED | OUTPATIENT
Start: 2020-07-23 | End: 2020-08-10

## 2020-07-23 RX ORDER — CHOLECALCIFEROL TAB 125 MCG (5000 UNIT) 125 MCG
5000 TAB ORAL DAILY
Status: DISCONTINUED | OUTPATIENT
Start: 2020-07-24 | End: 2020-07-24 | Stop reason: HOSPADM

## 2020-07-23 RX ORDER — FENTANYL CITRATE 50 UG/ML
INJECTION, SOLUTION INTRAMUSCULAR; INTRAVENOUS AS NEEDED
Status: DISCONTINUED | OUTPATIENT
Start: 2020-07-23 | End: 2020-07-23 | Stop reason: HOSPADM

## 2020-07-23 RX ORDER — DIPHENHYDRAMINE HYDROCHLORIDE 50 MG/ML
12.5 INJECTION, SOLUTION INTRAMUSCULAR; INTRAVENOUS
Status: ACTIVE | OUTPATIENT
Start: 2020-07-23 | End: 2020-07-24

## 2020-07-23 RX ORDER — KETOROLAC TROMETHAMINE 30 MG/ML
15 INJECTION, SOLUTION INTRAMUSCULAR; INTRAVENOUS
Status: DISCONTINUED | OUTPATIENT
Start: 2020-07-23 | End: 2020-07-23 | Stop reason: HOSPADM

## 2020-07-23 RX ORDER — LIDOCAINE HYDROCHLORIDE 10 MG/ML
0.1 INJECTION, SOLUTION EPIDURAL; INFILTRATION; INTRACAUDAL; PERINEURAL AS NEEDED
Status: DISCONTINUED | OUTPATIENT
Start: 2020-07-23 | End: 2020-07-23 | Stop reason: HOSPADM

## 2020-07-23 RX ORDER — ONDANSETRON 2 MG/ML
4 INJECTION INTRAMUSCULAR; INTRAVENOUS AS NEEDED
Status: DISCONTINUED | OUTPATIENT
Start: 2020-07-23 | End: 2020-07-23 | Stop reason: HOSPADM

## 2020-07-23 RX ORDER — AMOXICILLIN 250 MG
1 CAPSULE ORAL 2 TIMES DAILY
Status: DISCONTINUED | OUTPATIENT
Start: 2020-07-23 | End: 2020-07-24 | Stop reason: HOSPADM

## 2020-07-23 RX ORDER — ACETAMINOPHEN 325 MG/1
975 TABLET ORAL ONCE
Status: COMPLETED | OUTPATIENT
Start: 2020-07-23 | End: 2020-07-23

## 2020-07-23 RX ORDER — HYDROMORPHONE HYDROCHLORIDE 1 MG/ML
0.5 INJECTION, SOLUTION INTRAMUSCULAR; INTRAVENOUS; SUBCUTANEOUS
Status: DISCONTINUED | OUTPATIENT
Start: 2020-07-23 | End: 2020-07-23 | Stop reason: HOSPADM

## 2020-07-23 RX ORDER — PROPOFOL 10 MG/ML
INJECTION, EMULSION INTRAVENOUS
Status: DISCONTINUED | OUTPATIENT
Start: 2020-07-23 | End: 2020-07-23 | Stop reason: HOSPADM

## 2020-07-23 RX ORDER — SODIUM CHLORIDE 0.9 % (FLUSH) 0.9 %
5-40 SYRINGE (ML) INJECTION AS NEEDED
Status: DISCONTINUED | OUTPATIENT
Start: 2020-07-23 | End: 2020-07-24 | Stop reason: HOSPADM

## 2020-07-23 RX ORDER — OXYCODONE HYDROCHLORIDE 5 MG/1
5 TABLET ORAL
Status: DISCONTINUED | OUTPATIENT
Start: 2020-07-23 | End: 2020-07-24 | Stop reason: HOSPADM

## 2020-07-23 RX ORDER — CELECOXIB 100 MG/1
200 CAPSULE ORAL 2 TIMES DAILY
Status: DISCONTINUED | OUTPATIENT
Start: 2020-07-23 | End: 2020-07-24 | Stop reason: HOSPADM

## 2020-07-23 RX ORDER — OXYCODONE HYDROCHLORIDE 5 MG/1
10 TABLET ORAL
Status: DISCONTINUED | OUTPATIENT
Start: 2020-07-23 | End: 2020-07-24 | Stop reason: HOSPADM

## 2020-07-23 RX ORDER — SODIUM CHLORIDE, SODIUM LACTATE, POTASSIUM CHLORIDE, CALCIUM CHLORIDE 600; 310; 30; 20 MG/100ML; MG/100ML; MG/100ML; MG/100ML
150 INJECTION, SOLUTION INTRAVENOUS CONTINUOUS
Status: DISCONTINUED | OUTPATIENT
Start: 2020-07-23 | End: 2020-07-23 | Stop reason: HOSPADM

## 2020-07-23 RX ORDER — ALBUTEROL SULFATE 0.83 MG/ML
2.5 SOLUTION RESPIRATORY (INHALATION) AS NEEDED
Status: DISCONTINUED | OUTPATIENT
Start: 2020-07-23 | End: 2020-07-23 | Stop reason: HOSPADM

## 2020-07-23 RX ORDER — DIPHENHYDRAMINE HYDROCHLORIDE 50 MG/ML
12.5 INJECTION, SOLUTION INTRAMUSCULAR; INTRAVENOUS AS NEEDED
Status: DISCONTINUED | OUTPATIENT
Start: 2020-07-23 | End: 2020-07-23 | Stop reason: HOSPADM

## 2020-07-23 RX ORDER — SODIUM CHLORIDE, SODIUM LACTATE, POTASSIUM CHLORIDE, CALCIUM CHLORIDE 600; 310; 30; 20 MG/100ML; MG/100ML; MG/100ML; MG/100ML
125 INJECTION, SOLUTION INTRAVENOUS CONTINUOUS
Status: DISCONTINUED | OUTPATIENT
Start: 2020-07-23 | End: 2020-07-23 | Stop reason: HOSPADM

## 2020-07-23 RX ORDER — TRAMADOL HYDROCHLORIDE 50 MG/1
50 TABLET ORAL
Qty: 40 TAB | Refills: 0 | Status: SHIPPED | OUTPATIENT
Start: 2020-07-23 | End: 2020-07-30

## 2020-07-23 RX ORDER — POVIDONE-IODINE 10 %
SOLUTION, NON-ORAL TOPICAL AS NEEDED
Status: DISCONTINUED | OUTPATIENT
Start: 2020-07-23 | End: 2020-07-23 | Stop reason: HOSPADM

## 2020-07-23 RX ORDER — OXYCODONE HYDROCHLORIDE 5 MG/1
10 TABLET ORAL
Status: DISCONTINUED | OUTPATIENT
Start: 2020-07-23 | End: 2020-07-23 | Stop reason: HOSPADM

## 2020-07-23 RX ORDER — SODIUM CHLORIDE 9 MG/ML
125 INJECTION, SOLUTION INTRAVENOUS CONTINUOUS
Status: DISPENSED | OUTPATIENT
Start: 2020-07-23 | End: 2020-07-24

## 2020-07-23 RX ORDER — ROPIVACAINE HYDROCHLORIDE 2 MG/ML
INJECTION, SOLUTION EPIDURAL; INFILTRATION; PERINEURAL AS NEEDED
Status: DISCONTINUED | OUTPATIENT
Start: 2020-07-23 | End: 2020-07-23 | Stop reason: HOSPADM

## 2020-07-23 RX ORDER — ONDANSETRON 2 MG/ML
4 INJECTION INTRAMUSCULAR; INTRAVENOUS
Status: ACTIVE | OUTPATIENT
Start: 2020-07-23 | End: 2020-07-24

## 2020-07-23 RX ORDER — OXYCODONE HCL 10 MG/1
10 TABLET, FILM COATED, EXTENDED RELEASE ORAL ONCE
Status: COMPLETED | OUTPATIENT
Start: 2020-07-23 | End: 2020-07-23

## 2020-07-23 RX ORDER — MIDAZOLAM HYDROCHLORIDE 1 MG/ML
INJECTION, SOLUTION INTRAMUSCULAR; INTRAVENOUS AS NEEDED
Status: DISCONTINUED | OUTPATIENT
Start: 2020-07-23 | End: 2020-07-23 | Stop reason: HOSPADM

## 2020-07-23 RX ORDER — PROPOFOL 10 MG/ML
INJECTION, EMULSION INTRAVENOUS AS NEEDED
Status: DISCONTINUED | OUTPATIENT
Start: 2020-07-23 | End: 2020-07-23 | Stop reason: HOSPADM

## 2020-07-23 RX ORDER — ACETAMINOPHEN 500 MG
500-1000 TABLET ORAL
Qty: 60 TAB | Refills: 0 | Status: SHIPPED | OUTPATIENT
Start: 2020-07-23 | End: 2021-03-18

## 2020-07-23 RX ORDER — GABAPENTIN 100 MG/1
CAPSULE ORAL
Qty: 120 CAP | Refills: 1 | Status: SHIPPED | OUTPATIENT
Start: 2020-07-23 | End: 2021-01-13 | Stop reason: DRUGHIGH

## 2020-07-23 RX ORDER — GABAPENTIN 300 MG/1
600 CAPSULE ORAL
Status: DISCONTINUED | OUTPATIENT
Start: 2020-07-23 | End: 2020-07-24 | Stop reason: HOSPADM

## 2020-07-23 RX ORDER — ONDANSETRON 8 MG/1
4 TABLET, ORALLY DISINTEGRATING ORAL
Qty: 30 TAB | Refills: 0 | Status: SHIPPED | OUTPATIENT
Start: 2020-07-23 | End: 2021-12-14

## 2020-07-23 RX ORDER — PREGABALIN 75 MG/1
75 CAPSULE ORAL ONCE
Status: COMPLETED | OUTPATIENT
Start: 2020-07-23 | End: 2020-07-23

## 2020-07-23 RX ORDER — ROSUVASTATIN CALCIUM 10 MG/1
10 TABLET, COATED ORAL
Status: DISCONTINUED | OUTPATIENT
Start: 2020-07-23 | End: 2020-07-24 | Stop reason: HOSPADM

## 2020-07-23 RX ORDER — IBUPROFEN 800 MG/1
800 TABLET ORAL
Qty: 50 TAB | Refills: 2 | Status: SHIPPED | OUTPATIENT
Start: 2020-07-23 | End: 2020-08-10

## 2020-07-23 RX ORDER — OXYCODONE HYDROCHLORIDE 5 MG/1
5 TABLET ORAL
Qty: 42 TAB | Refills: 0 | Status: SHIPPED | OUTPATIENT
Start: 2020-07-23 | End: 2020-07-30

## 2020-07-23 RX ORDER — ACETAMINOPHEN 325 MG/1
650 TABLET ORAL EVERY 6 HOURS
Status: DISCONTINUED | OUTPATIENT
Start: 2020-07-23 | End: 2020-07-24 | Stop reason: HOSPADM

## 2020-07-23 RX ORDER — HYDROMORPHONE HYDROCHLORIDE 1 MG/ML
0.5 INJECTION, SOLUTION INTRAMUSCULAR; INTRAVENOUS; SUBCUTANEOUS
Status: ACTIVE | OUTPATIENT
Start: 2020-07-23 | End: 2020-07-24

## 2020-07-23 RX ORDER — ASPIRIN 81 MG/1
81 TABLET ORAL 2 TIMES DAILY
Status: DISCONTINUED | OUTPATIENT
Start: 2020-07-23 | End: 2020-07-24 | Stop reason: HOSPADM

## 2020-07-23 RX ORDER — FACIAL-BODY WIPES
10 EACH TOPICAL DAILY PRN
Status: DISCONTINUED | OUTPATIENT
Start: 2020-07-25 | End: 2020-07-24 | Stop reason: HOSPADM

## 2020-07-23 RX ORDER — CELECOXIB 100 MG/1
100 CAPSULE ORAL ONCE
Status: COMPLETED | OUTPATIENT
Start: 2020-07-23 | End: 2020-07-23

## 2020-07-23 RX ORDER — FAMOTIDINE 20 MG/1
20 TABLET, FILM COATED ORAL 2 TIMES DAILY
Status: DISCONTINUED | OUTPATIENT
Start: 2020-07-23 | End: 2020-07-24 | Stop reason: HOSPADM

## 2020-07-23 RX ORDER — GABAPENTIN 100 MG/1
100 CAPSULE ORAL
Status: DISCONTINUED | OUTPATIENT
Start: 2020-07-24 | End: 2020-07-24 | Stop reason: HOSPADM

## 2020-07-23 RX ORDER — EPHEDRINE SULFATE/0.9% NACL/PF 50 MG/5 ML
SYRINGE (ML) INTRAVENOUS AS NEEDED
Status: DISCONTINUED | OUTPATIENT
Start: 2020-07-23 | End: 2020-07-23 | Stop reason: HOSPADM

## 2020-07-23 RX ORDER — NALOXONE HYDROCHLORIDE 0.4 MG/ML
0.4 INJECTION, SOLUTION INTRAMUSCULAR; INTRAVENOUS; SUBCUTANEOUS AS NEEDED
Status: DISCONTINUED | OUTPATIENT
Start: 2020-07-23 | End: 2020-07-24 | Stop reason: HOSPADM

## 2020-07-23 RX ORDER — POLYETHYLENE GLYCOL 3350 17 G/17G
17 POWDER, FOR SOLUTION ORAL DAILY
Status: DISCONTINUED | OUTPATIENT
Start: 2020-07-24 | End: 2020-07-24 | Stop reason: HOSPADM

## 2020-07-23 RX ADMIN — MIDAZOLAM HYDROCHLORIDE 1 MG: 2 INJECTION, SOLUTION INTRAMUSCULAR; INTRAVENOUS at 09:02

## 2020-07-23 RX ADMIN — CEFAZOLIN SODIUM 2 G: 1 POWDER, FOR SOLUTION INTRAMUSCULAR; INTRAVENOUS at 09:19

## 2020-07-23 RX ADMIN — ACETAMINOPHEN 975 MG: 325 TABLET ORAL at 07:30

## 2020-07-23 RX ADMIN — ACETAMINOPHEN 650 MG: 325 TABLET ORAL at 15:34

## 2020-07-23 RX ADMIN — Medication 10 MG: at 09:13

## 2020-07-23 RX ADMIN — Medication 10 MG: at 09:31

## 2020-07-23 RX ADMIN — GABAPENTIN 600 MG: 300 CAPSULE ORAL at 22:07

## 2020-07-23 RX ADMIN — PREGABALIN 75 MG: 75 CAPSULE ORAL at 07:30

## 2020-07-23 RX ADMIN — Medication 10 ML: at 15:35

## 2020-07-23 RX ADMIN — FENTANYL CITRATE 50 MCG: 0.05 INJECTION, SOLUTION INTRAMUSCULAR; INTRAVENOUS at 09:02

## 2020-07-23 RX ADMIN — PROPOFOL 12 MG: 10 INJECTION, EMULSION INTRAVENOUS at 10:25

## 2020-07-23 RX ADMIN — SODIUM CHLORIDE 125 ML/HR: 900 INJECTION, SOLUTION INTRAVENOUS at 11:56

## 2020-07-23 RX ADMIN — SODIUM CHLORIDE, SODIUM LACTATE, POTASSIUM CHLORIDE, AND CALCIUM CHLORIDE: 600; 310; 30; 20 INJECTION, SOLUTION INTRAVENOUS at 09:31

## 2020-07-23 RX ADMIN — FENTANYL CITRATE 50 MCG: 0.05 INJECTION, SOLUTION INTRAMUSCULAR; INTRAVENOUS at 08:35

## 2020-07-23 RX ADMIN — CEFAZOLIN SODIUM 2 G: 1 POWDER, FOR SOLUTION INTRAMUSCULAR; INTRAVENOUS at 23:58

## 2020-07-23 RX ADMIN — BUPIVACAINE HYDROCHLORIDE 2 ML: 5 INJECTION, SOLUTION EPIDURAL; INTRACAUDAL; PERINEURAL at 09:08

## 2020-07-23 RX ADMIN — MIDAZOLAM HYDROCHLORIDE 1 MG: 2 INJECTION, SOLUTION INTRAMUSCULAR; INTRAVENOUS at 08:35

## 2020-07-23 RX ADMIN — Medication 5 MG: at 09:44

## 2020-07-23 RX ADMIN — PROPOFOL 12 MG: 10 INJECTION, EMULSION INTRAVENOUS at 09:26

## 2020-07-23 RX ADMIN — Medication 5 MG: at 09:46

## 2020-07-23 RX ADMIN — CELECOXIB 100 MG: 100 CAPSULE ORAL at 07:30

## 2020-07-23 RX ADMIN — TRANEXAMIC ACID 1 G: 100 INJECTION, SOLUTION INTRAVENOUS at 09:01

## 2020-07-23 RX ADMIN — CELECOXIB 200 MG: 100 CAPSULE ORAL at 17:58

## 2020-07-23 RX ADMIN — DOCUSATE SODIUM 50MG AND SENNOSIDES 8.6MG 1 TABLET: 8.6; 5 TABLET, FILM COATED ORAL at 17:58

## 2020-07-23 RX ADMIN — SODIUM CHLORIDE, SODIUM LACTATE, POTASSIUM CHLORIDE, AND CALCIUM CHLORIDE: 600; 310; 30; 20 INJECTION, SOLUTION INTRAVENOUS at 09:14

## 2020-07-23 RX ADMIN — Medication 10 ML: at 22:08

## 2020-07-23 RX ADMIN — PROPOFOL 12 MG: 10 INJECTION, EMULSION INTRAVENOUS at 10:32

## 2020-07-23 RX ADMIN — SODIUM CHLORIDE, SODIUM LACTATE, POTASSIUM CHLORIDE, AND CALCIUM CHLORIDE 150 ML/HR: 600; 310; 30; 20 INJECTION, SOLUTION INTRAVENOUS at 07:33

## 2020-07-23 RX ADMIN — OXYCODONE HYDROCHLORIDE 10 MG: 10 TABLET, FILM COATED, EXTENDED RELEASE ORAL at 07:30

## 2020-07-23 RX ADMIN — CEFAZOLIN SODIUM 2 G: 1 POWDER, FOR SOLUTION INTRAMUSCULAR; INTRAVENOUS at 15:34

## 2020-07-23 RX ADMIN — Medication 10 MG: at 10:07

## 2020-07-23 RX ADMIN — TRANEXAMIC ACID 1 G: 100 INJECTION, SOLUTION INTRAVENOUS at 10:52

## 2020-07-23 RX ADMIN — ROPIVACAINE HYDROCHLORIDE 20 ML: 2 INJECTION, SOLUTION EPIDURAL; INFILTRATION; PERINEURAL at 08:42

## 2020-07-23 RX ADMIN — ROSUVASTATIN CALCIUM 10 MG: 10 TABLET, COATED ORAL at 22:07

## 2020-07-23 RX ADMIN — FAMOTIDINE 20 MG: 20 TABLET, FILM COATED ORAL at 17:58

## 2020-07-23 RX ADMIN — ACETAMINOPHEN 650 MG: 325 TABLET ORAL at 23:58

## 2020-07-23 RX ADMIN — ASPIRIN 81 MG: 81 TABLET, COATED ORAL at 15:34

## 2020-07-23 RX ADMIN — PROPOFOL 75 MCG/KG/MIN: 10 INJECTION, EMULSION INTRAVENOUS at 09:26

## 2020-07-23 RX ADMIN — Medication 10 MG: at 09:56

## 2020-07-23 RX ADMIN — ROPIVACAINE HYDROCHLORIDE 20 ML: 2 INJECTION, SOLUTION EPIDURAL; INFILTRATION; PERINEURAL at 08:39

## 2020-07-23 NOTE — PROGRESS NOTES
Problem: Mobility Impaired (Adult and Pediatric)  Goal: *Acute Goals and Plan of Care (Insert Text)  Description: FUNCTIONAL STATUS PRIOR TO ADMISSION: Patient was independent and active without use of DME.    HOME SUPPORT PRIOR TO ADMISSION: The patient lived alone; her son will assist her upon discharge. Physical Therapy Goals  Initiated 7/23/2020    1. Patient will move from supine to sit and sit to supine  in bed with modified independence within 4 days. 2. Patient will perform sit to stand with modified independence within 4 days. 3. Patient will ambulate with modified independence for 100 feet with the least restrictive device within 4 days. 4. Patient will ascend/descend 1 stairs with 0 handrail(s) with minimal assistance/contact guard assist within 4 days. 5. Patient will perform home exercise program per protocol with independence within 4 days. 6. Patient will demonstrate AROM 0-90 degrees in operative joint within 4 days. Outcome: Progressing Towards Goal   PHYSICAL THERAPY EVALUATION  Patient: Sandy Landon (89 y.o. female)  Date: 7/23/2020  Primary Diagnosis: Primary osteoarthritis of left knee [M17.12]  Primary osteoarthritis of left knee [M17.12]  Procedure(s) (LRB):  LEFT TOTAL KNEE ARTHROPLASTY MAKOPLASTY (Left) Day of Surgery   Precautions:   WBAT, Fall(limit flexion on operative knee to 90 degrees)    ASSESSMENT  Based on the objective data described below, the patient presents with decreased ROM and strength to LLE, decreased bed mobility, transfers and gait following admission for left TKA, makoplasty. Patient lives alone but son will assist upon discharge. She will need a rolling walker and OP PT. Current Level of Function Impacting Discharge (mobility/balance): Educated patient regarding bed exercises and limit of flexion on operative knee to 90 degrees. Handout provided and she expressed understanding. Patient is currently lacking left dorsiflexion and SLR.  She was able to stand and ambulate with rolling walker to bedside commode and back with +2 min assist. BP low but stable. Returned to bed and placed on blue wedge pillow with ice in place. Functional Outcome Measure: The patient scored 50/100 on the Barthel outcome measure. Other factors to consider for discharge: none     Patient will benefit from skilled therapy intervention to address the above noted impairments. PLAN :  Recommendations and Planned Interventions: bed mobility training, transfer training, gait training, and therapeutic exercises      Frequency/Duration: Patient will be followed by physical therapy:  twice daily to address goals. Recommendation for discharge: (in order for the patient to meet his/her long term goals)  Outpatient physical therapy follow up recommended for TKA    This discharge recommendation:  Has not yet been discussed the attending provider and/or case management    IF patient discharges home will need the following DME: rolling walker         SUBJECTIVE:   Patient stated I want to get better so I can  play with my 3year old grandchild.     OBJECTIVE DATA SUMMARY:   HISTORY:    Past Medical History:   Diagnosis Date    Arthritis     Colon polyps 3/23/2010    Hypercholesterolemia     Sleep apnea 3/23/2010     Past Surgical History:   Procedure Laterality Date    ABDOMEN SURGERY PROC UNLISTED      ruptured spleen    HX ORTHOPAEDIC      multiple fxs       Personal factors and/or comorbidities impacting plan of care: none    Home Situation  Rails to Enter: No  Current DME Used/Available at Home: Blood pressure cuff, Cane, straight    EXAMINATION/PRESENTATION/DECISION MAKING:   Critical Behavior:  Neurologic State: Alert  Orientation Level: Oriented X4     Safety/Judgement: Insight into deficits, Good awareness of safety precautions       Skin:  not fully observed    Range Of Motion:  AROM: Within functional limits(lacking dorsiflexion on left)        LLE AROM  L Knee Flexion: 90  L Knee Extension: 5              Strength:    Strength: Within functional limits(unable to do SLRL on left)                    Tone & Sensation:   Tone: Normal              Sensation: Intact(except numb to LLE)                       Functional Mobility:  Bed Mobility:     Supine to Sit: Contact guard assistance  Sit to Supine: Contact guard assistance  Scooting: Stand-by assistance  Transfers:  Sit to Stand: Contact guard assistance;Assist x2  Stand to Sit: Contact guard assistance;Assist x2                       Balance:   Sitting: Intact  Standing: Intact; With support  Ambulation/Gait Training:  Distance (ft): 6 Feet (ft)  Assistive Device: Gait belt;Walker, rolling  Ambulation - Level of Assistance: Assist x2;Contact guard assistance        Gait Abnormalities: Step to gait     Left Side Weight Bearing: As tolerated                                        Therapeutic Exercises: Ankle pumps, quad and heel sets, heel slides    Functional Measure:  Barthel Index:    Bathin  Bladder: 10  Bowels: 10  Groomin  Dressin  Feeding: 10  Mobility: 0  Stairs: 0  Toilet Use: 5  Transfer (Bed to Chair and Back): 5  Total: 50/100       The Barthel ADL Index: Guidelines  1. The index should be used as a record of what a patient does, not as a record of what a patient could do. 2. The main aim is to establish degree of independence from any help, physical or verbal, however minor and for whatever reason. 3. The need for supervision renders the patient not independent. 4. A patient's performance should be established using the best available evidence. Asking the patient, friends/relatives and nurses are the usual sources, but direct observation and common sense are also important. However direct testing is not needed. 5. Usually the patient's performance over the preceding 24-48 hours is important, but occasionally longer periods will be relevant.   6. Middle categories imply that the patient supplies over 50 per cent of the effort. 7. Use of aids to be independent is allowed. Viki Pham., Barthel, D.W. (4387). Functional evaluation: the Barthel Index. [address] (14)2. PENNY Balderas, Lauren Bender., Savagemuel Hatchet., [place], [address] (1999). Measuring the change indisability after inpatient rehabilitation; comparison of the responsiveness of the Barthel Index and Functional [place] Measure. Journal of Neurology, Neurosurgery, and Psychiatry, 66(4), 365-056. Lisset Worley, JOSE, MARGARITA Hood, & Chanda James M.A. (2004.) Assessment of post-stroke quality of life in cost-effectiveness studies: The usefulness of the Barthel Index and the EuroQoL-5D. Quality of Life Research, 15, 601-52           Physical Therapy Evaluation Charge Determination   History Examination Presentation Decision-Making   LOW Complexity : Zero comorbidities / personal factors that will impact the outcome / POC LOW Complexity : 1-2 Standardized tests and measures addressing body structure, function, activity limitation and / or participation in recreation  LOW Complexity : Stable, uncomplicated  Other outcome measures Barthel  LOW       Based on the above components, the patient evaluation is determined to be of the following complexity level: LOW     Pain Rating:  None at this time    Activity Tolerance:   Good  Please refer to the flowsheet for vital signs taken during this treatment. After treatment patient left in no apparent distress:   Supine in bed, Call bell within reach, Bed / chair alarm activated, and Side rails x 3    COMMUNICATION/EDUCATION:   The patients plan of care was discussed with: Registered nurse. Fall prevention education was provided and the patient/caregiver indicated understanding. and Patient/family agree to work toward stated goals and plan of care.     Thank you for this referral.  Mary Worley, PT   Time Calculation: 27 mins

## 2020-07-23 NOTE — ROUTINE PROCESS
TRANSFER - OUT REPORT:    Verbal report given to ZECHARIAH Dorman (name) on Sung Arnold  being transferred to [facility] (unit) for routine post - op       Report consisted of patients Situation, Background, Assessment and   Recommendations(SBAR). Information from the following report(s) SBAR, OR Summary, Intake/Output, MAR and Cardiac Rhythm NSR  was reviewed with the receiving nurse. Lines:   Peripheral IV 07/23/20 Right Forearm (Active)   Site Assessment Clean, dry, & intact 07/23/20 1154   Phlebitis Assessment 0 07/23/20 1154   Infiltration Assessment 0 07/23/20 1154   Dressing Status Clean, dry, & intact 07/23/20 1154   Dressing Type Transparent 07/23/20 1154   Hub Color/Line Status Infusing 07/23/20 1154   Alcohol Cap Used Yes 07/23/20 0732        Opportunity for questions and clarification was provided.       Patient transported with:   Registered Nurse

## 2020-07-23 NOTE — ANESTHESIA PREPROCEDURE EVALUATION
Relevant Problems   No relevant active problems       Anesthetic History   No history of anesthetic complications            Review of Systems / Medical History  Patient summary reviewed, nursing notes reviewed and pertinent labs reviewed    Pulmonary          Smoker         Neuro/Psych   Within defined limits           Cardiovascular    Hypertension          Hyperlipidemia    Exercise tolerance: >4 METS     GI/Hepatic/Renal  Within defined limits              Endo/Other        Arthritis     Other Findings              Physical Exam    Airway  Mallampati: III    Neck ROM: normal range of motion   Mouth opening: Normal     Cardiovascular    Rhythm: regular  Rate: normal         Dental  No notable dental hx       Pulmonary  Breath sounds clear to auscultation               Abdominal         Other Findings            Anesthetic Plan    ASA: 2  Anesthesia type: spinal and regional - femoral single shot and popliteal fossa block            Anesthetic plan and risks discussed with: Patient      Informed consent obtained.

## 2020-07-23 NOTE — ANESTHESIA POSTPROCEDURE EVALUATION
Procedure(s):  LEFT TOTAL KNEE ARTHROPLASTY MAKOPLASTY. spinal, regional    Anesthesia Post Evaluation      Multimodal analgesia: multimodal analgesia used between 6 hours prior to anesthesia start to PACU discharge  Patient location during evaluation: PACU  Patient participation: complete - patient participated  Level of consciousness: awake and alert  Airway patency: patent  Anesthetic complications: no  Cardiovascular status: acceptable  Respiratory status: acceptable  Hydration status: acceptable        INITIAL Post-op Vital signs:   Vitals Value Taken Time   /55 7/23/2020 12:20 PM   Temp 36.6 °C (97.9 °F) 7/23/2020 11:37 AM   Pulse 66 7/23/2020 12:22 PM   Resp 17 7/23/2020 12:22 PM   SpO2 97 % 7/23/2020 12:22 PM   Vitals shown include unvalidated device data.

## 2020-07-23 NOTE — OP NOTES
OPERATIVE REPORT     Admit Date: 7/23/2020  Admit Diagnosis: Primary osteoarthritis of left knee [M17.12]  Primary osteoarthritis of left knee [M17.12]    Date of Procedure: 7/23/2020   Preoperative Diagnosis: Primary osteoarthritis of left knee [M17.12]  Postoperative Diagnosis: Primary osteoarthritis of left knee     Procedure: Procedure(s):  LEFT TOTAL KNEE ARTHROPLASTY MAKOPLASTY  Surgeon: Jose Angel Argueta MD  Assistant(s): Marek Masters PA-C  Anesthesia: Spinal   Estimated Blood Loss: 300cc  Specimens: * No specimens in log *   Complications: None       INDICATIONS:   The patient is a 76 y.o., female who has complained of a long history of knee pain. The patient  has failed conservative treatment and presents for definitive operative care. Informed consent obtained including a discussion of the risks and benefits, which include, but are not limited to, bleeding, infection, neurovascular damage, wound complications, pain and stiffness in the knee, periprosthetic loosening, fracture dislocation and DVT, the patient consented for the procedure. DESCRIPTION OF PROCEDURE:        The patient was seen in the preoperative holding area. The patient was positively identified. The limb was initialed,  questions were answered. The patient was given Ancef preop for an antibiotic. The patient was subsequently taken to the operating room. The patient underwent spinal anesthesia. The patient was positioned in the supine position. All bony prominences were well padded. The limb was prepped and draped in a sterile fashion. The appropriate pause for safety was performed. A mid-line incision was created. Utilizing an incision from above the superior pole of the patella distally to the tibial tubercle. The incision was taken down through the skin and subcutaneous tissue until the retinaculum could be identified. This was sharply incised utilizing a medial parapatellar incision.  The femoral array was placed at the superior portion of the patella. The patellar was everted, a planar resurfacing was performed and the drill guide was placed with the appropriate rotation. The medial-based soft tissue was then retracted as well as well as the lateral tissue. Amado pins were placed within the incision for thetibial array (one hand breadth proximal to the superior pole of the patella). The femoral and tibial trackers were placed. The hip center or rotation was established. Registration was performed on the femur and tibia. Osteophytes were removed. The knee was balanced in both flexion and extension with force applied. The computer model of implants and position was verified. Once this was complete the MAKOplasty robot was positioned. Standard cuts were made for the tibia first. The tibial cut was removed. The remaining femoral cuts were made with the robot. The blade was changed and the medial meniscus was removed. The tibial preparation was completed. Including removal of residual medial and lateral mensci. Tibial baseplate placement and keel preparation were performed along with drill holes for the tibial baseplate. Final bony osteophytes were removed and the meniscal rim was removed. The knee was injected with 60cc of Morphine / Ketoralac and Lidocaine. Trial implants were placed and range of motion along with overall fit was established with very good integrity of the MCL noted. The amado pins and trackers were removed and accounted for prior to closure. All the bony surfaces were irrigated. The tibia was placed first, then the poly, residual osteophytes were removed and then the femur. Finally, the patella was placed and press-fit with the clamp / impaction. There was normal tracking of the patella at the conclusion of the case. The knee was very stable after it was taken through a full range of motion. The wound was closed with 0 Vicryl and then number 2 Quill proximally.  Once this had been completed, the skin was closed with 2-0 Vicryl 4-0 monocryl suture and Dermabond. A sterile dressing was applied. The patient was taken from the operating room in stable condition. OPERATIVE FINDINGS : Severe varus OA of the knee. IMPLANTS :   Implant Name Type Inv. Item Serial No.  Lot No. LRB No. Used Action   IMPL KNEE TIB INSRT X3 SZ 2 9MM - SNA  IMPL KNEE TIB INSRT X3 SZ 2 9MM NA [employer] HOW [id number] Left 1 Implanted   PAT ASYM MTL-BK 9MM SZ A29 -- TRIATHLON - SNA  PAT ASYM MTL-BK 9MM SZ A29 -- TRIATHLON NA [employer] HOW  Left 1 Implanted   BASEPLT TIB PC TRITNM SZ 2 -- TRIATHLON - SNA  BASEPLT TIB PC TRITNM SZ 2 -- TRIATHLON NA [employer] HOW [id number] Left 1 Implanted   COMPNT FEM CR TRIATHLN 2 L PA --  - SNA  COMPNT FEM CR TRIATHLN 2 L PA --  NA [employer] HOW [id number] Left 1 Implanted       POST OPERATIVE CONSIDERATIONS :  WBAT    JUSTIFICATION FOR SURGICAL ASSISTANT:   Surgical Assistant, was requried and necessary in this case, to help with soft tissue retraction, extremity positioning, equiment management, implant management, and wound closure.     Moira Brittle, MD

## 2020-07-23 NOTE — ANESTHESIA PROCEDURE NOTES
Peripheral Block    Start time: 7/23/2020 8:35 AM  End time: 7/23/2020 8:42 AM  Performed by: Bruna Lam CRNA  Authorized by: Rebecca Little DO       Pre-procedure: Indications: at surgeon's request and post-op pain management    Preanesthetic Checklist: patient identified, risks and benefits discussed, site marked, timeout performed, anesthesia consent given and patient being monitored    Timeout Time: 08:35          Block Type:   Block Type:  Popliteal and femoral single shot  Laterality:  Left  Monitoring:  Continuous pulse ox, frequent vital sign checks, heart rate and responsive to questions  Injection Technique:  Single shot  Procedures: ultrasound guided and nerve stimulator    Patient Position: supine  Prep: chlorhexidine    Location:  Upper thigh  Needle Type:  Stimuplex  Needle Gauge:  22 G  Needle Localization:  Ultrasound guidance    Assessment:  Number of attempts:  1  Injection Assessment:  Incremental injection every 5 mL, local visualized surrounding nerve on ultrasound, negative aspiration for blood, no paresthesia and no intravascular symptoms  Patient tolerance:  Patient tolerated the procedure well with no immediate complications  Popliteal block done under ultrasound guidance with incremental injection of Ropivacaine 0.2% 20 cc using a 21 G Stimuplex needle.

## 2020-07-23 NOTE — ANESTHESIA PROCEDURE NOTES
Spinal Block    Start time: 7/23/2020 9:01 AM  End time: 7/23/2020 9:08 AM  Performed by: Richard Lang CRNA  Authorized by: Sonya Melgar DO     Pre-procedure:   Indications: at surgeon's request and primary anesthetic  Preanesthetic Checklist: patient identified, risks and benefits discussed, anesthesia consent, site marked, patient being monitored and timeout performed    Timeout Time: 09:01          Spinal Block:   Patient Position:  Seated  Prep Region:  Lumbar  Prep: DuraPrep      Location:  L3-4  Technique:  Single shot        Needle:   Needle Type:  Pencan  Needle Gauge:  25 G  Attempts:  2      Events: CSF confirmed, no blood with aspiration and no paresthesia        Assessment:  Insertion:  Uncomplicated  Patient tolerance:  Patient tolerated the procedure well with no immediate complications

## 2020-07-24 VITALS
HEART RATE: 66 BPM | WEIGHT: 129.19 LBS | OXYGEN SATURATION: 96 % | RESPIRATION RATE: 16 BRPM | DIASTOLIC BLOOD PRESSURE: 68 MMHG | BODY MASS INDEX: 23.77 KG/M2 | TEMPERATURE: 98 F | SYSTOLIC BLOOD PRESSURE: 128 MMHG | HEIGHT: 62 IN

## 2020-07-24 LAB
ANION GAP SERPL CALC-SCNC: 2 MMOL/L (ref 5–15)
BUN SERPL-MCNC: 13 MG/DL (ref 6–20)
BUN/CREAT SERPL: 16 (ref 12–20)
CALCIUM SERPL-MCNC: 7.4 MG/DL (ref 8.5–10.1)
CHLORIDE SERPL-SCNC: 111 MMOL/L (ref 97–108)
CO2 SERPL-SCNC: 26 MMOL/L (ref 21–32)
CREAT SERPL-MCNC: 0.79 MG/DL (ref 0.55–1.02)
GLUCOSE SERPL-MCNC: 88 MG/DL (ref 65–100)
HGB BLD-MCNC: 10 G/DL (ref 11.5–16)
POTASSIUM SERPL-SCNC: 4.4 MMOL/L (ref 3.5–5.1)
SODIUM SERPL-SCNC: 139 MMOL/L (ref 136–145)

## 2020-07-24 PROCEDURE — 97530 THERAPEUTIC ACTIVITIES: CPT

## 2020-07-24 PROCEDURE — 99218 HC RM OBSERVATION: CPT

## 2020-07-24 PROCEDURE — 97116 GAIT TRAINING THERAPY: CPT

## 2020-07-24 PROCEDURE — 97535 SELF CARE MNGMENT TRAINING: CPT | Performed by: OCCUPATIONAL THERAPIST

## 2020-07-24 PROCEDURE — 94760 N-INVAS EAR/PLS OXIMETRY 1: CPT

## 2020-07-24 PROCEDURE — 85018 HEMOGLOBIN: CPT

## 2020-07-24 PROCEDURE — 74011250636 HC RX REV CODE- 250/636: Performed by: PHYSICIAN ASSISTANT

## 2020-07-24 PROCEDURE — 36415 COLL VENOUS BLD VENIPUNCTURE: CPT

## 2020-07-24 PROCEDURE — 80048 BASIC METABOLIC PNL TOTAL CA: CPT

## 2020-07-24 PROCEDURE — G0378 HOSPITAL OBSERVATION PER HR: HCPCS

## 2020-07-24 PROCEDURE — 74011250637 HC RX REV CODE- 250/637: Performed by: PHYSICIAN ASSISTANT

## 2020-07-24 PROCEDURE — 74011000250 HC RX REV CODE- 250: Performed by: PHYSICIAN ASSISTANT

## 2020-07-24 PROCEDURE — 97165 OT EVAL LOW COMPLEX 30 MIN: CPT | Performed by: OCCUPATIONAL THERAPIST

## 2020-07-24 RX ADMIN — OXYCODONE 5 MG: 5 TABLET ORAL at 05:21

## 2020-07-24 RX ADMIN — OXYCODONE 5 MG: 5 TABLET ORAL at 13:39

## 2020-07-24 RX ADMIN — GABAPENTIN 100 MG: 100 CAPSULE ORAL at 09:04

## 2020-07-24 RX ADMIN — Medication 10 ML: at 05:17

## 2020-07-24 RX ADMIN — CELECOXIB 200 MG: 100 CAPSULE ORAL at 09:04

## 2020-07-24 RX ADMIN — ASPIRIN 81 MG: 81 TABLET, COATED ORAL at 09:04

## 2020-07-24 RX ADMIN — ACETAMINOPHEN 650 MG: 325 TABLET ORAL at 13:35

## 2020-07-24 RX ADMIN — CHOLECALCIFEROL TAB 125 MCG (5000 UNIT) 5000 UNITS: 125 TAB at 09:05

## 2020-07-24 RX ADMIN — FAMOTIDINE 20 MG: 20 TABLET, FILM COATED ORAL at 09:04

## 2020-07-24 RX ADMIN — DOCUSATE SODIUM 50MG AND SENNOSIDES 8.6MG 1 TABLET: 8.6; 5 TABLET, FILM COATED ORAL at 09:04

## 2020-07-24 RX ADMIN — OXYCODONE 5 MG: 5 TABLET ORAL at 09:05

## 2020-07-24 RX ADMIN — POLYETHYLENE GLYCOL 3350 17 G: 17 POWDER, FOR SOLUTION ORAL at 09:04

## 2020-07-24 RX ADMIN — CEFAZOLIN SODIUM 2 G: 1 POWDER, FOR SOLUTION INTRAMUSCULAR; INTRAVENOUS at 09:04

## 2020-07-24 RX ADMIN — ACETAMINOPHEN 650 MG: 325 TABLET ORAL at 05:17
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
## 2020-07-24 NOTE — PROGRESS NOTES
TOTAL KNEE ARTHROPLASTY DAILY NOTE     ASSESSMENT / PLAN :   1. Pain Control : Excellent - Able to sleep and participate with therapy  2. Overnight Issues : none  3. Wound or incisional issue : Healing incision with no visible drainage  4. Therapy / Weight Bearing Recommendations : Weight bear as tolerated with use of a walker and two person assist while mobilizing  5. DVT Prophylaxis : Mechanical and Aspirin and mechanical lower extremity compression device  6. Disposition : Discharge to home with home health (maximum of 4 visits)  7. Medical Concerns : Stable  8. Comments : Home today if doing well with therapy. POD  1 Day Post-Op s/p Procedure(s):  LEFT TOTAL KNEE ARTHROPLASTY MAKOPLASTY     SUBJECTIVE :     Overnight complaints : none. OBJECTIVE :     Vitals:    07/23/20 2307 07/24/20 0239 07/24/20 0838 07/24/20 1157   BP: 121/62 116/49 101/58 128/68   Pulse: 63 64 67 66   Resp: 16 16 16 16   Temp: 98 °F (36.7 °C) 98.3 °F (36.8 °C) 98 °F (36.7 °C) 98 °F (36.7 °C)   SpO2: 96% 96% 97% 96%   Weight:       Height:       LMP: 01/01/2007       Alert and oriented x3. Examination of the left knee reveals that the dressing is clean, dry and intact. Motor Exam : Pt is able to perform a straight leg raise. Sensation is intact to light touch. No calf pain. MEDS / LABS :     Key Anti-Platelet Anticoagulant Meds             aspirin delayed-release 81 mg tablet (Taking) Take 1 Tab by mouth two (2) times a day. aspirin delayed-release (ASPIR-81) 81 mg tablet Take 81 mg by mouth daily.            Labs:  Recent Labs     07/24/20  0540   HGB 10.0*      K 4.4   *   CO2 26   BUN 13   CREA 0.79   GLU 88      Patient mobility  Gait  Gait Abnormalities: Decreased step clearance, Step to gait  Ambulation - Level of Assistance: Contact guard assistance  Distance (ft): 100 Feet (ft)  Assistive Device: Walker, rolling, Gait belt  Rail Use: None(RW)  Stairs - Level of Assistance: Contact guard assistance  Number of Stairs Trained:  2(platform)        Gato Beavers MD  Cell [phone]  Paz Lorenzo PA-C  Cell [phone]  Medical Assistants: Carlos1 Emelia Mabryc [phone]                 Surgery Scheduler: [address] [phone] ext [phone]

## 2020-07-24 NOTE — PROGRESS NOTES
7/24/2020   12:23 PM  CM [facility] and who goes to pt's address. CM notified pt she will need to go to outpatient PT. Pt agreeable. RW delivered through [employer]. 10:02 AM  Reason for Admission: Elective - POA left knee requiring Surgery    Assessment:   [x]In person with pt   []Via p/c with pt   []With family member in person. Who/Relation:     []With family member via p/c. Who/Relation:   []Chart Review    RUR:  NA - OBS  Risk Level: [x]Low []Moderate []High  Value-based purchasing: [] Yes [x] No  Bundle patient: [] Yes [x] No   Specify:     Advance Directive: Full Code. No AD on file. Assessment:    Age: 76    Sex: [] Male [x]Female     Residency: [x]Private residence []Apartment []Assisted Living []LTC []Other:   Exterior Steps: 1  Interior Steps: 0    Lives With: []With spouse []Other family members []Underage children [x]Alone []Care provider [x]Other: Pt's son and neighbor will assist pt during recovery. Prior functioning:  [x]Independent []Dependent []Partial dependence   Pt requires assistance with: []Bathing []Dressing []Toileting []Ambulation     Prior DME required:  [x]None []RW []Cane []Crutches []Bedside commode []CPAP []Home O2 (Liter/Provider: ) []Nebulizer   []Shower Chair []Wheelchair []Hospital Bed []Rufus []Stair lift []Rollator []Other:    DME available: []None []RW []Cane []Crutches []Bedside commode []CPAP []Home O2 (Liter/Provider: ) []Nebulizer   []Shower Chair []Wheelchair []Hospital Bed []Rufus []Stair lift []Rollator [x]Other: RW ordered    Rehab history: []None []Outpatient PT [x]Home Health (Provider/Date: [facility] / unknown) []SNF (Provider/Date: ) []IPR (Provider/Date: ) []LTC (Provider/Date: )    Discharge Concerns: []Yes [x]No []Unknown   Describe: Insurer: [employer]    Observation notice provided in writing to patient and/or caregiver as well as verbal explanation of the policy. Patients who are in outpatient status also receive the Observation notice. PCP: Travis Sanchez MD   [facility] of Practice[facility]   Current patient: [x]Yes []No   Approximate date of last visit: 7/15/10   Access to virtual PCP visits: [x]Yes []No    Pharmacy:  [facility] - [place]    DC Transport:  Family        Transition of care plan:    []Unable to determine at this time. Awaiting clinical progress, and disposition recommendations. [] Home with outpatient follow-up    [] Home with Outpatient PT and outpatient follow-up   Pt aware of OP appt? []Yes, Provider:   []Not scheduled   Transport provider:     [] Home with family assistance as needed and outpatient follow-up   Family able to assist:    Schedule:  Transport provider:      [] Home with Home Health   - Provider:     []SNF/IPR   -[]Preferences given:   []Listing provided and preferences requested   -Status: []Pending []Accepted:    -Auth required: []Yes []No    -Auth initiated date:   -3 midnight stay required: []Yes []No  Date satisfied:     [] Other:     Sandeep Ramachandran MA    Care Management Interventions  PCP Verified by CM: Yes([facility])  Mode of Transport at Discharge:  Other (see comment)(Family)  Transition of Care Consult (CM Consult): Discharge Planning, [address]: No  Reason Outside [place]: Out of service area  Azra Carole: No  Discharge Durable Medical Equipment: Yes(RW)  Physical Therapy Consult: Yes  Occupational Therapy Consult: Yes  Speech Therapy Consult: No  Current Support Network: Lives Alone, Family Lives Nearby  Confirm Follow Up Transport: Family  The Plan for Transition of Care is Related to the Following Treatment Goals : POA left knee  The Patient and/or Patient Representative was Provided with a Choice of Provider and Agrees with the Discharge Plan?: (S) Yes  Name of the Patient Representative Who was Provided with a Choice of Provider and Agrees with the Discharge Plan: Self  Freedom of Choice List was Provided with Basic Dialogue that Supports the Patient's Individualized Plan of Care/Goals, Treatment Preferences and Shares the Quality Data Associated with the Providers?: (S) Yes  Discharge Location  Discharge Placement: Home with home health

## 2020-07-24 NOTE — PROGRESS NOTES
Problem: Falls - Risk of  Goal: *Absence of Falls  Description: Document Donata Carrel Fall Risk and appropriate interventions in the flowsheet.   Outcome: Resolved/Met       Problem: Knee Replacement: Post-Op Day 1  Goal: Off Pathway (Use only if patient is Off Pathway)  Outcome: Resolved/Met  Goal: Activity/Safety  Outcome: Resolved/Met  Goal: Diagnostic Test/Procedures  Outcome: Resolved/Met  Goal: Nutrition/Diet  Outcome: Resolved/Met  Goal: Medications  Outcome: Resolved/Met  Goal: Respiratory  Outcome: Resolved/Met  Goal: Treatments/Interventions/Procedures  Outcome: Resolved/Met  Goal: Psychosocial  Outcome: Resolved/Met  Goal: Discharge Planning  Outcome: Resolved/Met  Goal: *Demonstrates progressive activity  Outcome: Resolved/Met  Goal: *Optimal pain control at patient's stated goal  Outcome: Resolved/Met  Goal: *Hemodynamically stable  Outcome: Resolved/Met  Goal: *Discharge plan identified  Outcome: Resolved/Met

## 2020-07-24 NOTE — PROGRESS NOTES
Comprehensive Nutrition Assessment    Type and Reason for Visit: Consult    Nutrition Recommendations/Plan:   1. Continue Regular diet. 2. Recommend Ensure HP x1/d (160 kcal, 16 gm protein) to promote adequate protein intake. 3. Monitor PO intakes, GI function, labs, and wt trends. Nutrition Assessment:     7/24: 77 y/o F admitted with primary osteoarthritis of L knee. MD consult for ONS. PMH - diverticulitis, HTN. S/p L total knee arthroplasty makoplasty 7/23. Pt reports fair appetite with ~30% breakfast intake. Pt reports fair appetite PTA. Pt usually consumes x1-2 meals/d + snacks. No change in typical eating patterns PTA.  lb. BMI 23.6 WNL. Weight Hx per EMR shows 7 lb (5%) weight loss x 2 mo which is not significant for timeframe but noted. NKFA. No c/s difficulties. Pt denies n/v. LBM 7/22, on bowel regimen. Skin without PI. Educated pt on increased protein s/p surgery. Discussed protein rich sources and how to incorporate into diet. Pt agreeable to ONS x1/d in-house, prefers chocolate flavor. Labs - Ca 7.4 L, Hgb A1c 5.7. Meds - cholecalciferol, famotidine, polyethylene glycol, senna-docusate. PO Intakes:  No data found. Weight Hx:   Wt Readings from Last 10 Encounters:   07/23/20 58.6 kg (129 lb 3 oz)   07/16/20 59.4 kg (130 lb 15.3 oz)   06/03/20 61.7 kg (136 lb)   04/01/20 60.3 kg (133 lb)   03/18/20 60.3 kg (133 lb)   12/18/19 61 kg (134 lb 6.4 oz)   10/11/19 59.9 kg (132 lb)   07/15/19 60 kg (132 lb 3.2 oz)   06/18/19 60.8 kg (134 lb)   05/16/19 60.3 kg (133 lb)   ]    Malnutrition Assessment:  Malnutrition Status:  No criteria     Estimated Daily Nutrient Needs:  Energy (kcal):  1403(REE 1079 x 1.3)  Protein (g):  70 - 82(1.2 - 1.4 gm/kg)       Fluid (ml/day):  1403(1 ml/kcal)    Nutrition Related Findings:       LBM 7/22 - on bowel regimen     Wounds:    Surgical wound(knee L)       Current Nutrition Therapies:   DIET REGULAR  DIET NUTRITIONAL SUPPLEMENTS Lunch;  Ensure High Protein    Anthropometric Measures:  · Height:  5' 2\" (157.5 cm)  · Current Body Wt:  58.6 kg (129 lb 3 oz)   · Adjusted Body Weight:   ; Weight Adjustment for:     · Adjusted BMI:       · BMI Categories:  Normal weight (BMI 22.0-24.9) age over 72       Nutrition Diagnosis:   · Increased nutrient needs related to (increased demand for protein) as evidenced by (RD eval protein needs s/p surgery)      Nutrition Interventions:   Food and/or Nutrient Delivery: Continue current diet, Start oral nutrition supplement  Nutrition Education and Counseling: Education completed  Coordination of Nutrition Care: No recommendation at this time    Goals:  PO intakes >50% + ONS within 5-7 days       Nutrition Monitoring and Evaluation:   Behavioral-Environmental Outcomes:  None  Food/Nutrient Intake Outcomes: Food and nutrient intake, Supplement intake  Physical Signs/Symptoms Outcomes: GI status, Weight    Discharge Planning:    Continue oral nutrition supplement, Continue current diet     Electronically signed by Lenny Boast , RDN on 7/24/2020 at 10:54 AM    Contact: [phone] (office)

## 2020-07-24 NOTE — PROGRESS NOTES
Problem: Mobility Impaired (Adult and Pediatric)  Goal: *Acute Goals and Plan of Care (Insert Text)  Description: FUNCTIONAL STATUS PRIOR TO ADMISSION: Patient was independent and active without use of DME.    HOME SUPPORT PRIOR TO ADMISSION: The patient lived alone; her son will assist her upon discharge. Physical Therapy Goals  Initiated 7/23/2020    1. Patient will move from supine to sit and sit to supine  in bed with modified independence within 4 days. 2. Patient will perform sit to stand with modified independence within 4 days. 3. Patient will ambulate with modified independence for 100 feet with the least restrictive device within 4 days. 4. Patient will ascend/descend 1 stairs with 0 handrail(s) with minimal assistance/contact guard assist within 4 days. 5. Patient will perform home exercise program per protocol with independence within 4 days. 6. Patient will demonstrate AROM 0-90 degrees in operative joint within 4 days. Outcome: Progressing Towards Goal  Note:   PHYSICAL THERAPY TREATMENT  Patient: Phillip Luna (77 y.o. female)  Date: 7/24/2020  Diagnosis: Primary osteoarthritis of left knee [M17.12]  Primary osteoarthritis of left knee [M17.12]   <principal problem not specified>  Procedure(s) (LRB):  LEFT TOTAL KNEE ARTHROPLASTY MAKOPLASTY (Left) 1 Day Post-Op  Precautions: WBAT, Other (comment)(limit operative knee flexion to 90 degrees)  Chart, physical therapy assessment, plan of care and goals were reviewed. ASSESSMENT  Patient continues with skilled PT services and progressing towards goals. reports RLE pain from buttock to ankle, denies L knee pain. Performs at [facility] level for functional tranfers and gait training using RW, no knee buckling noted. Demonstrates good ROM, educated pt not to force flexion beyond 90 degrees. Pt verbalized understanding to use RW for all gait and transfer, reviewed HEP and proper car transfers. Pt is cleared for discharge from PT standpoint. Current Level of Function Impacting Discharge (mobility/balance): CGA    Other factors to consider for discharge: none         PLAN :  Patient continues to benefit from skilled intervention to address the above impairments. Continue treatment per established plan of care. to address goals. Recommendation for discharge: (in order for the patient to meet his/her long term goals)  Outpatient physical therapy follow up recommended for TKA  vs HHPT    This discharge recommendation:  Has been made in collaboration with the attending provider and/or case management    IF patient discharges home will need the following DME: rolling walker       SUBJECTIVE:   Patient stated I feel great except. .my R leg.     OBJECTIVE DATA SUMMARY:   Critical Behavior:  Neurologic State: Alert  Orientation Level: Oriented X4  Cognition: Appropriate decision making, Appropriate for age attention/concentration, Appropriate safety awareness, Follows commands  Safety/Judgement: Insight into deficits, Good awareness of safety precautions    Range of Motion:  AROM: Within functional limits                 LLE AROM  L Knee Flexion: 90  L Knee Extension: 5       Functional Mobility Training:  Bed Mobility:     Supine to Sit: Independent  Sit to Supine: Modified independent  Scooting: Modified independent        Transfers:  Sit to Stand: Stand-by assistance  Stand to Sit: Stand-by assistance        Bed to Chair: Modified independent;Supervision                    Balance:  Sitting: Intact  Standing: Intact; With support  Ambulation/Gait Training:  Distance (ft): 100 Feet (ft)  Assistive Device: Walker, rolling;Gait belt  Ambulation - Level of Assistance: Contact guard assistance        Gait Abnormalities: Decreased step clearance; Step to gait                                      Stairs:  Number of Stairs Trained:  2(platform)  Stairs - Level of Assistance: Contact guard assistance   Rail Use: None(RW)    Therapeutic Exercises:     EXERCISE Sets   Reps   Active Active Assist   Passive Self ROM   Comments   Ankle Pumps   [x]                                        []                                        []                                        []                                           Quad Sets   [x]                                        []                                        []                                        []                                           Hamstring Sets   [x]                                        []                                        []                                        []                                           Short Arc Quads   [x]                                        []                                        []                                        []                                           Knee Extension Stretch     []                                          []                                          [x]                                          []                                           Heel Slides   [x]                                        []                                        []                                        []                                           Long Arc Quads   [x]                                        []                                        []                                        []                                           Knee Flexion Stretch   []                                        []                                        []                                        []                                           Straight Leg Raises   [x]                                        []                                        []                                        []                                               Pain Ratin/10    Activity Tolerance:   Good  Please refer to the flowsheet for vital signs taken during this treatment.     After treatment patient left in no apparent distress:   Supine in bed, Call bell within reach, and Bed / chair alarm activated    COMMUNICATION/COLLABORATION:   The patients plan of care was discussed with: Registered nurse.      Chris Anis   Time Calculation: 26 mins
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
## 2020-07-24 NOTE — PROGRESS NOTES
Problem: Falls - Risk of  Goal: *Absence of Falls  Description: Document Jamie Shore Fall Risk and appropriate interventions in the flowsheet.   Outcome: Progressing Towards Goal  Note: Fall Risk Interventions:  Mobility Interventions: Communicate number of staff needed for ambulation/transfer, Patient to call before getting OOB, Utilize walker, cane, or other assistive device, Utilize gait belt for transfers/ambulation         Medication Interventions: Patient to call before getting OOB, Teach patient to arise slowly, Utilize gait belt for transfers/ambulation    Elimination Interventions: Call light in reach, Patient to call for help with toileting needs, Stay With Me (per policy)              Problem: Patient Education: Go to Patient Education Activity  Goal: Patient/Family Education  Outcome: Progressing Towards Goal     Problem: Patient Education: Go to Patient Education Activity  Goal: Patient/Family Education  Outcome: Progressing Towards Goal     Problem: Patient Education: Go to Patient Education Activity  Goal: Patient/Family Education  Outcome: Progressing Towards Goal     Problem: Knee Replacement: Day of Surgery/Unit  Goal: Activity/Safety  Outcome: Progressing Towards Goal  Goal: Consults, if ordered  Outcome: Progressing Towards Goal  Goal: Diagnostic Test/Procedures  Outcome: Progressing Towards Goal  Goal: Nutrition/Diet  Outcome: Progressing Towards Goal  Goal: Medications  Outcome: Progressing Towards Goal  Goal: Respiratory  Outcome: Progressing Towards Goal  Goal: Treatments/Interventions/Procedures  Outcome: Progressing Towards Goal  Goal: Psychosocial  Outcome: Progressing Towards Goal  Goal: *Initiate mobility  Outcome: Progressing Towards Goal  Goal: *Optimal pain control at patient's stated goal  Outcome: Progressing Towards Goal  Goal: *Hemodynamically stable  Outcome: Progressing Towards Goal     Problem: Knee Replacement: Post-Op Day 1  Goal: Activity/Safety  Outcome: Progressing Towards Goal  Goal: Diagnostic Test/Procedures  Outcome: Progressing Towards Goal  Goal: Nutrition/Diet  Outcome: Progressing Towards Goal  Goal: Medications  Outcome: Progressing Towards Goal  Goal: Respiratory  Outcome: Progressing Towards Goal  Goal: Treatments/Interventions/Procedures  Outcome: Progressing Towards Goal  Goal: Psychosocial  Outcome: Progressing Towards Goal  Goal: Discharge Planning  Outcome: Progressing Towards Goal  Goal: *Demonstrates progressive activity  Outcome: Progressing Towards Goal  Goal: *Optimal pain control at patient's stated goal  Outcome: Progressing Towards Goal  Goal: *Hemodynamically stable  Outcome: Progressing Towards Goal  Goal: *Discharge plan identified  Outcome: Progressing Towards Goal     Problem: Knee Replacement: Post-Op Day 2  Goal: Activity/Safety  Outcome: Progressing Towards Goal  Goal: Diagnostic Test/Procedures  Outcome: Progressing Towards Goal  Goal: Medications  Outcome: Progressing Towards Goal  Goal: Respiratory  Outcome: Progressing Towards Goal  Goal: Treatments/Interventions/Procedures  Outcome: Progressing Towards Goal  Goal: Psychosocial  Outcome: Progressing Towards Goal  Goal: *Met physical therapy criteria for discharge to the next level of care  Outcome: Progressing Towards Goal  Goal: *Optimal pain control with oral analgesia  Outcome: Progressing Towards Goal  Goal: *Hemodynamically stable  Outcome: Progressing Towards Goal  Goal: *Tolerating diet  Outcome: Progressing Towards Goal  Goal: *Patient verbalizes understanding of discharge instructions  Outcome: Progressing Towards Goal

## 2020-07-24 NOTE — PROGRESS NOTES
Rounds with Dr. Mariza Sanchez completed. Patient has cleared PTDavid Choudhury for discharge home. Will place orders accordingly.     Jane Mayers NP

## 2020-08-04 NOTE — PERIOP NOTES
08/04/20, 5:11 PM    [facility] Services Chart Audit,  follow up - Patient Encounter Reviewed

## 2020-08-10 ENCOUNTER — VIRTUAL VISIT (OUTPATIENT)
Dept: FAMILY MEDICINE CLINIC | Age: 65
End: 2020-08-10
Payer: MEDICARE

## 2020-08-10 DIAGNOSIS — N18.30 CHRONIC KIDNEY DISEASE, STAGE 3 (MODERATE): ICD-10-CM

## 2020-08-10 DIAGNOSIS — M85.80 OSTEOPENIA, UNSPECIFIED LOCATION: ICD-10-CM

## 2020-08-10 DIAGNOSIS — Z92.89 HISTORY OF BLOOD TRANSFUSION: ICD-10-CM

## 2020-08-10 DIAGNOSIS — E78.2 MIXED HYPERLIPIDEMIA: ICD-10-CM

## 2020-08-10 DIAGNOSIS — K92.2 GASTROINTESTINAL HEMORRHAGE, UNSPECIFIED GASTROINTESTINAL HEMORRHAGE TYPE: Primary | ICD-10-CM

## 2020-08-10 DIAGNOSIS — I10 ESSENTIAL HYPERTENSION: ICD-10-CM

## 2020-08-10 DIAGNOSIS — D62 ANEMIA DUE TO ACUTE BLOOD LOSS: ICD-10-CM

## 2020-08-10 PROCEDURE — 99443 PR PHYS/QHP TELEPHONE EVALUATION 21-30 MIN: CPT | Performed by: FAMILY MEDICINE

## 2020-08-10 RX ORDER — PANTOPRAZOLE SODIUM 40 MG/1
TABLET, DELAYED RELEASE ORAL
COMMUNITY
Start: 2020-08-07 | End: 2021-03-18

## 2020-08-10 RX ORDER — CYCLOBENZAPRINE HCL 5 MG
TABLET ORAL
COMMUNITY
Start: 2020-08-07 | End: 2020-09-02 | Stop reason: SDUPTHER
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
## 2020-08-14 ENCOUNTER — TELEPHONE (OUTPATIENT)
Dept: FAMILY MEDICINE CLINIC | Age: 65
End: 2020-08-14

## 2020-08-14 NOTE — TELEPHONE ENCOUNTER
Returned call to Jenelle Blackwell, with PT. Verbal order given to Jenelle Blackwell per Dr. Tracie Espinoza: PT today and 1x week one and 2x 3 weeks. Jenelle Blackwell will fax over order to be signed.

## 2020-08-14 NOTE — TELEPHONE ENCOUNTER
----- Message from Sarahdonnydev Rosalio sent at 8/14/2020  7:58 AM EDT -----  Regarding: Dr. Daphne Millan Message/Vendor Calls    Caller's first and last name: Constantino Mota Physical Therapist      Reason for call:verbal order for physical therapy today       Callback required yes/no and why:yes      Best contact number(s):[phone]      Details to clarify the request:wants a verbal order for physical therapy for the patient      Chacha Mirandas

## 2020-09-04 RX ORDER — CYCLOBENZAPRINE HCL 5 MG
5 TABLET ORAL
Qty: 90 TAB | Refills: 0 | Status: SHIPPED | OUTPATIENT
Start: 2020-09-04 | End: 2021-03-18

## 2020-09-15 ENCOUNTER — VIRTUAL VISIT (OUTPATIENT)
Dept: FAMILY MEDICINE CLINIC | Age: 65
End: 2020-09-15
Payer: MEDICARE

## 2020-09-15 DIAGNOSIS — D64.9 ANEMIA, UNSPECIFIED TYPE: ICD-10-CM

## 2020-09-15 DIAGNOSIS — Z96.659 HISTORY OF KNEE REPLACEMENT, UNSPECIFIED LATERALITY: ICD-10-CM

## 2020-09-15 DIAGNOSIS — M51.36 DDD (DEGENERATIVE DISC DISEASE), LUMBAR: ICD-10-CM

## 2020-09-15 DIAGNOSIS — D64.9 ANEMIA, UNSPECIFIED TYPE: Primary | ICD-10-CM

## 2020-09-15 PROCEDURE — 99443 PR PHYS/QHP TELEPHONE EVALUATION 21-30 MIN: CPT | Performed by: FAMILY MEDICINE

## 2020-09-15 NOTE — PROGRESS NOTES
Chief Complaint   Patient presents with   [facility] Medication Evaluation     1. Have you been to the ER, urgent care clinic since your last visit? Hospitalized since your last visit? No    2. Have you seen or consulted any other health care providers outside of the [address] [place] [address] since your last visit? Include any pap smears or colon screening.  No    Reviewed record in preparation for visit and have necessary documentation  Pt did not bring medication to office visit for review  opportunity was given for questions  Goals that were addressed and/or need to be completed during or after this appointment include   Health Maintenance Due   Topic Date Due    GLAUCOMA SCREENING Q2Y  04/13/2019    Colonoscopy  07/27/2019    Flu Vaccine (1) 09/01/2020
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
## 2020-09-16 NOTE — PROGRESS NOTES
CMP unremarkable, Iron profile normal, and Hemoglobin is normal again though still lower than patient baseline.

## 2020-09-29 ENCOUNTER — TELEPHONE (OUTPATIENT)
Dept: FAMILY MEDICINE CLINIC | Age: 65
End: 2020-09-29

## 2020-09-29 ENCOUNTER — CLINICAL SUPPORT (OUTPATIENT)
Dept: FAMILY MEDICINE CLINIC | Age: 65
End: 2020-09-29
Payer: MEDICARE

## 2020-09-29 VITALS — TEMPERATURE: 97.9 F

## 2020-09-29 DIAGNOSIS — Z23 ENCOUNTER FOR IMMUNIZATION: Primary | ICD-10-CM

## 2020-09-29 PROCEDURE — 90694 VACC AIIV4 NO PRSRV 0.5ML IM: CPT | Performed by: FAMILY MEDICINE

## 2020-09-29 PROCEDURE — G0008 ADMIN INFLUENZA VIRUS VAC: HCPCS | Performed by: FAMILY MEDICINE

## 2020-09-29 NOTE — TELEPHONE ENCOUNTER
Returned call to Ronda Gunderson available, message left to return call, need to inform per Dr. Abner Purdy:  tell her Lisinopril 5 mg is correct.

## 2020-09-29 NOTE — TELEPHONE ENCOUNTER
----- Message from Marissa Fuentes sent at 9/29/2020 10:42 AM EDT -----  Regarding: Dr. Teofilo Clancy  General Message/Vendor Calls    Caller's first and last name:Annetta From [facility]      Reason for call:strength of Lisinopril      Callback required yes/no and why:yes      Best contact number(s):[phone]      Details to clarify the request:Annetta needs to tell the patient what strength for the Lisinopril 20 mgs or 5 mgs      Marissa Lies

## 2020-09-29 NOTE — TELEPHONE ENCOUNTER
[address] [facility]               Patient return call     Caller's first and last name and relationship (if not the patient)::Annetta From [id number] contact number(s):[phone]       Whose call is being returned:Agnew, Corinne L       Details to clarify the request:       Albin Gibbons

## 2020-09-29 NOTE — PROGRESS NOTES
Pt in for flu shot only and denies any recent illness. VIS available, no questions. Pt tolerated well.

## 2020-09-29 NOTE — TELEPHONE ENCOUNTER
Returned call to [facility], informed per Dr. Yuni Malik:  tell her Lisinopril 5 mg is correct. [facility] verbalized understanding and appreciative.

## 2020-10-28 ENCOUNTER — VIRTUAL VISIT (OUTPATIENT)
Dept: FAMILY MEDICINE CLINIC | Age: 65
End: 2020-10-28
Payer: MEDICARE

## 2020-10-28 DIAGNOSIS — R42 VERTIGO: Primary | ICD-10-CM

## 2020-10-28 PROCEDURE — 99442 PR PHYS/QHP TELEPHONE EVALUATION 11-20 MIN: CPT | Performed by: FAMILY MEDICINE

## 2020-10-28 RX ORDER — MECLIZINE HYDROCHLORIDE 25 MG/1
25 TABLET ORAL
Qty: 30 TAB | Refills: 0 | Status: SHIPPED | OUTPATIENT
Start: 2020-10-28 | End: 2020-11-07

## 2020-10-28 NOTE — PROGRESS NOTES
Chief Complaint   Patient presents with    Dizziness     1. Have you been to the ER, urgent care clinic since your last visit? Hospitalized since your last visit? No    2. Have you seen or consulted any other health care providers outside of the [address] [place] [address] since your last visit? Include any pap smears or colon screening.  No    Reviewed record in preparation for visit and have necessary documentation  Pt did not bring medication to office visit for review  Information was given to pt on Advanced Directives, Living Will  Information was given on Shingles Vaccine  Opportunity was given for questions  Goals that were addressed and/or need to be completed after this appointment include:     Health Maintenance Due   Topic Date Due    GLAUCOMA SCREENING Q2Y  04/13/2019
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
## 2021-01-11 DIAGNOSIS — M51.36 DDD (DEGENERATIVE DISC DISEASE), LUMBAR: Chronic | ICD-10-CM

## 2021-01-13 RX ORDER — GABAPENTIN 300 MG/1
CAPSULE ORAL
Qty: 60 CAP | Refills: 0 | Status: SHIPPED | OUTPATIENT
Start: 2021-01-13 | End: 2021-03-18 | Stop reason: SDUPTHER

## 2021-03-18 ENCOUNTER — VIRTUAL VISIT (OUTPATIENT)
Dept: FAMILY MEDICINE CLINIC | Age: 66
End: 2021-03-18
Payer: MEDICARE

## 2021-03-18 DIAGNOSIS — E78.5 HYPERLIPIDEMIA, UNSPECIFIED HYPERLIPIDEMIA TYPE: ICD-10-CM

## 2021-03-18 DIAGNOSIS — M79.2 NEUROPATHIC PAIN: ICD-10-CM

## 2021-03-18 DIAGNOSIS — M51.36 DDD (DEGENERATIVE DISC DISEASE), LUMBAR: Chronic | ICD-10-CM

## 2021-03-18 DIAGNOSIS — I10 ESSENTIAL HYPERTENSION: Primary | ICD-10-CM

## 2021-03-18 PROCEDURE — 99442 PR PHYS/QHP TELEPHONE EVALUATION 11-20 MIN: CPT | Performed by: FAMILY MEDICINE

## 2021-03-18 RX ORDER — GABAPENTIN 300 MG/1
CAPSULE ORAL
Qty: 60 CAP | Refills: 2 | Status: SHIPPED | OUTPATIENT
Start: 2021-03-18 | End: 2021-05-17
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
## 2021-06-16 ENCOUNTER — OFFICE VISIT (OUTPATIENT)
Dept: FAMILY MEDICINE CLINIC | Age: 66
End: 2021-06-16
Payer: MEDICARE

## 2021-06-16 VITALS
RESPIRATION RATE: 18 BRPM | HEART RATE: 60 BPM | SYSTOLIC BLOOD PRESSURE: 134 MMHG | OXYGEN SATURATION: 99 % | TEMPERATURE: 97.2 F | DIASTOLIC BLOOD PRESSURE: 67 MMHG | BODY MASS INDEX: 25.98 KG/M2 | HEIGHT: 62 IN | WEIGHT: 141.2 LBS

## 2021-06-16 DIAGNOSIS — M25.562 CHRONIC PAIN OF LEFT KNEE: ICD-10-CM

## 2021-06-16 DIAGNOSIS — G89.29 CHRONIC PAIN OF LEFT KNEE: ICD-10-CM

## 2021-06-16 DIAGNOSIS — I10 ESSENTIAL HYPERTENSION: ICD-10-CM

## 2021-06-16 DIAGNOSIS — M81.0 AGE-RELATED OSTEOPOROSIS WITHOUT CURRENT PATHOLOGICAL FRACTURE: Primary | ICD-10-CM

## 2021-06-16 DIAGNOSIS — M79.604 LEG PAIN, RIGHT: ICD-10-CM

## 2021-06-16 DIAGNOSIS — M17.32 POST-TRAUMATIC OSTEOARTHRITIS OF LEFT KNEE: ICD-10-CM

## 2021-06-16 DIAGNOSIS — E78.5 HYPERLIPIDEMIA, UNSPECIFIED HYPERLIPIDEMIA TYPE: ICD-10-CM

## 2021-06-16 PROBLEM — M17.12 PRIMARY OSTEOARTHRITIS OF LEFT KNEE: Status: RESOLVED | Noted: 2020-07-23 | Resolved: 2021-06-16

## 2021-06-16 PROBLEM — M17.12 ARTHRITIS OF LEFT KNEE: Chronic | Status: RESOLVED | Noted: 2019-12-18 | Resolved: 2021-06-16

## 2021-06-16 PROBLEM — Z96.652 STATUS POST LEFT KNEE REPLACEMENT: Status: ACTIVE | Noted: 2021-06-16

## 2021-06-16 PROCEDURE — G8752 SYS BP LESS 140: HCPCS | Performed by: FAMILY MEDICINE

## 2021-06-16 PROCEDURE — G8419 CALC BMI OUT NRM PARAM NOF/U: HCPCS | Performed by: FAMILY MEDICINE

## 2021-06-16 PROCEDURE — G8754 DIAS BP LESS 90: HCPCS | Performed by: FAMILY MEDICINE

## 2021-06-16 PROCEDURE — 1101F PT FALLS ASSESS-DOCD LE1/YR: CPT | Performed by: FAMILY MEDICINE

## 2021-06-16 PROCEDURE — 3017F COLORECTAL CA SCREEN DOC REV: CPT | Performed by: FAMILY MEDICINE

## 2021-06-16 PROCEDURE — G8510 SCR DEP NEG, NO PLAN REQD: HCPCS | Performed by: FAMILY MEDICINE

## 2021-06-16 PROCEDURE — G8427 DOCREV CUR MEDS BY ELIG CLIN: HCPCS | Performed by: FAMILY MEDICINE

## 2021-06-16 PROCEDURE — 1090F PRES/ABSN URINE INCON ASSESS: CPT | Performed by: FAMILY MEDICINE

## 2021-06-16 PROCEDURE — 99214 OFFICE O/P EST MOD 30 MIN: CPT | Performed by: FAMILY MEDICINE

## 2021-06-16 PROCEDURE — G8536 NO DOC ELDER MAL SCRN: HCPCS | Performed by: FAMILY MEDICINE

## 2021-06-16 NOTE — PROGRESS NOTES
Chief Complaint   Patient presents with    Follow Up Chronic Condition     Visit Vitals  /67 (BP 1 Location: Right arm, BP Patient Position: Sitting, BP Cuff Size: Adult)   Pulse 60   Temp 97.2 °F (36.2 °C) (Temporal)   Resp 18   Ht 5' 2\" (1.575 m)   Wt 141 lb 3.2 oz (64 kg)   LMP 01/01/2007   SpO2 99%   BMI 25.83 kg/m²     1. Have you been to the ER, urgent care clinic since your last visit? Hospitalized since your last visit? No    2. Have you seen or consulted any other health care providers outside of the [address] [place] [address] since your last visit? Include any pap smears or colon screening.  No    Reviewed record in preparation for visit and have necessary documentation  Pt did not bring medication to office visit for review  opportunity was given for questions  Goals that were addressed and/or need to be completed during or after this appointment include   Health Maintenance Due   Topic Date Due    COVID-19 Vaccine (1) Never done    Shingrix Vaccine Age 50> (2 of 2) 11/26/2019    Colorectal Cancer Screening Combo  03/22/2021    Breast Cancer Screen Mammogram  05/29/2021    Medicare Yearly Exam  07/16/2021
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
## 2021-06-17 ENCOUNTER — TELEPHONE (OUTPATIENT)
Dept: FAMILY MEDICINE CLINIC | Age: 66
End: 2021-06-17

## 2021-06-17 LAB
ALBUMIN SERPL-MCNC: 4.2 G/DL (ref 3.5–5)
ALBUMIN/GLOB SERPL: 1.5 {RATIO} (ref 1.1–2.2)
ALP SERPL-CCNC: 74 U/L (ref 45–117)
ALT SERPL-CCNC: 17 U/L (ref 12–78)
ANION GAP SERPL CALC-SCNC: 3 MMOL/L (ref 5–15)
AST SERPL-CCNC: 17 U/L (ref 15–37)
BILIRUB SERPL-MCNC: 0.6 MG/DL (ref 0.2–1)
BUN SERPL-MCNC: 21 MG/DL (ref 6–20)
BUN/CREAT SERPL: 27 (ref 12–20)
CALCIUM SERPL-MCNC: 9.4 MG/DL (ref 8.5–10.1)
CHLORIDE SERPL-SCNC: 107 MMOL/L (ref 97–108)
CHOLEST SERPL-MCNC: 155 MG/DL
CO2 SERPL-SCNC: 28 MMOL/L (ref 21–32)
CREAT SERPL-MCNC: 0.77 MG/DL (ref 0.55–1.02)
ERYTHROCYTE [DISTWIDTH] IN BLOOD BY AUTOMATED COUNT: 12.2 % (ref 11.5–14.5)
GLOBULIN SER CALC-MCNC: 2.8 G/DL (ref 2–4)
GLUCOSE SERPL-MCNC: 80 MG/DL (ref 65–100)
HCT VFR BLD AUTO: 39.3 % (ref 35–47)
HDLC SERPL-MCNC: 83 MG/DL
HDLC SERPL: 1.9 {RATIO} (ref 0–5)
HGB BLD-MCNC: 12 G/DL (ref 11.5–16)
LDLC SERPL CALC-MCNC: 60.6 MG/DL (ref 0–100)
MCH RBC QN AUTO: 30.3 PG (ref 26–34)
MCHC RBC AUTO-ENTMCNC: 30.5 G/DL (ref 30–36.5)
MCV RBC AUTO: 99.2 FL (ref 80–99)
NRBC # BLD: 0 K/UL (ref 0–0.01)
NRBC BLD-RTO: 0 PER 100 WBC
PLATELET # BLD AUTO: 247 K/UL (ref 150–400)
PMV BLD AUTO: 11.4 FL (ref 8.9–12.9)
POTASSIUM SERPL-SCNC: 5.6 MMOL/L (ref 3.5–5.1)
PROT SERPL-MCNC: 7 G/DL (ref 6.4–8.2)
RBC # BLD AUTO: 3.96 M/UL (ref 3.8–5.2)
SODIUM SERPL-SCNC: 138 MMOL/L (ref 136–145)
TRIGL SERPL-MCNC: 57 MG/DL (ref ?–150)
VLDLC SERPL CALC-MCNC: 11.4 MG/DL
WBC # BLD AUTO: 8.8 K/UL (ref 3.6–11)

## 2021-06-17 NOTE — TELEPHONE ENCOUNTER
Patient called per Dr. Luz Sow:  Based on her Kidneys she could do that medication, but I would be worried about bleeds as well.  I would try 400 mg every 8 hours first.     Patient verbalized understanding and appreciative.

## 2021-06-17 NOTE — TELEPHONE ENCOUNTER
Patient called per Dr. Bridger Steele:  Based on her Kidneys she could do that medication, but I would be worried about bleeds as well. I would try 400 mg every 8 hours first.     Patient unavailable, left message to return call.     Please advise of message from Dr. Bridger Steele

## 2021-06-17 NOTE — TELEPHONE ENCOUNTER
Pt called. would like to know, based off lab results, her options for pain medication. She feel like she gets better relief from IBUPROFEN 800 mg, but is worried of taking too much because of stomach bleed last year. Please Advise.

## 2021-09-16 ENCOUNTER — OFFICE VISIT (OUTPATIENT)
Dept: FAMILY MEDICINE CLINIC | Age: 66
End: 2021-09-16
Payer: MEDICARE

## 2021-09-16 VITALS
DIASTOLIC BLOOD PRESSURE: 66 MMHG | BODY MASS INDEX: 26.46 KG/M2 | WEIGHT: 143.8 LBS | HEIGHT: 62 IN | SYSTOLIC BLOOD PRESSURE: 123 MMHG | OXYGEN SATURATION: 99 % | TEMPERATURE: 97.3 F | HEART RATE: 62 BPM | RESPIRATION RATE: 18 BRPM

## 2021-09-16 DIAGNOSIS — Z00.00 MEDICARE ANNUAL WELLNESS VISIT, SUBSEQUENT: Primary | ICD-10-CM

## 2021-09-16 DIAGNOSIS — Z23 ENCOUNTER FOR IMMUNIZATION: ICD-10-CM

## 2021-09-16 PROCEDURE — 1101F PT FALLS ASSESS-DOCD LE1/YR: CPT | Performed by: FAMILY MEDICINE

## 2021-09-16 PROCEDURE — G8754 DIAS BP LESS 90: HCPCS | Performed by: FAMILY MEDICINE

## 2021-09-16 PROCEDURE — 90694 VACC AIIV4 NO PRSRV 0.5ML IM: CPT | Performed by: FAMILY MEDICINE

## 2021-09-16 PROCEDURE — G0008 ADMIN INFLUENZA VIRUS VAC: HCPCS | Performed by: FAMILY MEDICINE

## 2021-09-16 PROCEDURE — G8752 SYS BP LESS 140: HCPCS | Performed by: FAMILY MEDICINE

## 2021-09-16 PROCEDURE — G8419 CALC BMI OUT NRM PARAM NOF/U: HCPCS | Performed by: FAMILY MEDICINE

## 2021-09-16 PROCEDURE — G8536 NO DOC ELDER MAL SCRN: HCPCS | Performed by: FAMILY MEDICINE

## 2021-09-16 PROCEDURE — 3017F COLORECTAL CA SCREEN DOC REV: CPT | Performed by: FAMILY MEDICINE

## 2021-09-16 PROCEDURE — G0439 PPPS, SUBSEQ VISIT: HCPCS | Performed by: FAMILY MEDICINE

## 2021-09-16 PROCEDURE — G8510 SCR DEP NEG, NO PLAN REQD: HCPCS | Performed by: FAMILY MEDICINE

## 2021-09-16 PROCEDURE — G8427 DOCREV CUR MEDS BY ELIG CLIN: HCPCS | Performed by: FAMILY MEDICINE

## 2021-09-16 NOTE — PROGRESS NOTES
Chief Complaint   Patient presents with   Carlita Chawla Annual Wellness Visit     Visit Vitals  BP (!) 140/66 (BP 1 Location: Right arm)   Pulse 62   Temp 97.3 °F (36.3 °C) (Temporal)   Resp 18   Ht 5' 2\" (1.575 m)   Wt 143 lb 12.8 oz (65.2 kg)   LMP 01/01/2007   SpO2 99%   BMI 26.30 kg/m²     1. Have you been to the ER, urgent care clinic since your last visit? Hospitalized since your last visit? No    2. Have you seen or consulted any other health care providers outside of the [address] [place] [address] since your last visit? Include any pap smears or colon screening.  No    Reviewed record in preparation for visit and have necessary documentation  Pt did not bring medication to office visit for review  opportunity was given for questions  Goals that were addressed and/or need to be completed during or after this appointment include   Health Maintenance Due   Topic Date Due    COVID-19 Vaccine (1) Never done    Low dose CT lung screening  Never done    Shingrix Vaccine Age 50> (2 of 2) 11/26/2019    Colorectal Cancer Screening Combo  03/22/2021    Breast Cancer Screen Mammogram  05/29/2021    Medicare Yearly Exam  07/16/2021    Flu Vaccine (1) 09/01/2021

## 2021-09-16 NOTE — PATIENT INSTRUCTIONS
Medicare Wellness Visit, Female     The best way to live healthy is to have a lifestyle where you eat a well-balanced diet, exercise regularly, limit alcohol use, and quit all forms of tobacco/nicotine, if applicable. Regular preventive services are another way to keep healthy. Preventive services (vaccines, screening tests, monitoring & exams) can help personalize your care plan, which helps you manage your own care. Screening tests can find health problems at the earliest stages, when they are easiest to treat. [facility] follows the current, evidence-based guidelines published by the [facility] ([facility]) when recommending preventive services for our patients. Because we follow these guidelines, sometimes recommendations change over time as research supports it. (For example, mammograms used to be recommended annually. Even though Medicare will still pay for an annual mammogram, the newer guidelines recommend a mammogram every two years for women of average risk). Of course, you and your doctor may decide to screen more often for some diseases, based on your risk and your co-morbidities (chronic disease you are already diagnosed with). Preventive services for you include:  - Medicare offers their members a free annual wellness visit, which is time for you and your primary care provider to discuss and plan for your preventive service needs. Take advantage of this benefit every year!  -All adults over the age of 72 should receive the recommended pneumonia vaccines. Current USPSTF guidelines recommend a series of two vaccines for the best pneumonia protection.   -All adults should have a flu vaccine yearly and a tetanus vaccine every 10 years.   -All adults age 48 and older should receive the shingles vaccines (series of two vaccines).       -All adults age 38-68 who are overweight should have a diabetes screening test once every three years.   -All adults born between [birth year] and 1965 should be screened once for Hepatitis C.  -Other screening tests and preventive services for persons with diabetes include: an eye exam to screen for diabetic retinopathy, a kidney function test, a foot exam, and stricter control over your cholesterol.   -Cardiovascular screening for adults with routine risk involves an electrocardiogram (ECG) at intervals determined by your doctor.   -Colorectal cancer screenings should be done for adults age 54-65 with no increased risk factors for colorectal cancer. There are a number of acceptable methods of screening for this type of cancer. Each test has its own benefits and drawbacks. Discuss with your doctor what is most appropriate for you during your annual wellness visit. The different tests include: colonoscopy (considered the best screening method), a fecal occult blood test, a fecal DNA test, and sigmoidoscopy.    -A bone mass density test is recommended when a woman turns 65 to screen for osteoporosis. This test is only recommended one time, as a screening. Some providers will use this same test as a disease monitoring tool if you already have osteoporosis. -Breast cancer screenings are recommended every other year for women of normal risk, age 54-69.  -Cervical cancer screenings for women over age 72 are only recommended with certain risk factors.      Here is a list of your current Health Maintenance items (your personalized list of preventive services) with a due date:  Health Maintenance Due   Topic Date Due    COVID-19 Vaccine (1) Never done    Smoker or Former Smoker - Annual Lung Cancer Screen  Never done    Shingles Vaccine (2 of 2) 11/26/2019    Colorectal Screening  03/22/2021    Mammogram  05/29/2021    Yearly Flu Vaccine (1) 09/01/2021

## 2021-09-16 NOTE — PROGRESS NOTES
This is the Subsequent Medicare Annual Wellness Exam, performed 12 months or more after the Initial AWV or the last Subsequent AWV    I have reviewed the patient's medical history in detail and updated the computerized patient record. History     Well Woman exam:  Frandy Escobar is a 78 y.o. female presenting for well woman exam.     How would you rate your health in generally over the last year? Good  Has your physical and emotional health limited your social activities with family or friends? no    Current Complaints? None (See attached Problem visit note if applicable)    Menstrual/Sexual History:  Age at which menses began: 14 y.o. Vaginal Bleeding: None    PAP History: Some but always became normal      Sexually active: No    Risk factors for breast cancer: Low    Diet/Exercise History:  Diet: Favorite food None. - Does patient eat at least 5 servings of Fruits/vegetables daily? No   - Is patient currently dieting? No    Exercise: Patient does not have structured exercise  - Does patient get 150 minutes of structured exercise weekly? 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 None    Healthcare maintenance:   Health Maintenance Due   Topic Date Due    COVID-19 Vaccine (1) Never done    Low dose CT lung screening  Never done    Shingrix Vaccine Age 50> (2 of 2) 2019    Colorectal Cancer Screening Combo  2021    Breast Cancer Screen Mammogram  2021     Mammogram indicated? Yes  Colonoscopy indicated? No , [address] [place] [address]/[facility]  DEXA scan indicated? No   HIV/STI testing indicated? No   Hepatitis C testing indicated? No   Lung cancer screening indicated? Yes  AAA screening indicated?   No     Immunization History   Administered Date(s) Administered    (RETIRED) Pneumococcal Vaccine (Unspecified Type) 2005    Influenza Vaccine 2012, 10/27/2015, 2017    Influenza Vaccine (Quad) PF (>6 Mo Flulaval, Fluarix, and >3 Yrs Madeline Jimenez [id number]) 10/11/2016, 2018    Influenza Vaccine (Tri) Adjuvanted (>65 Yrs FLUAD TRI 92760) 10/11/2019    Influenza Vaccine Split 09/10/2009, 10/20/2010    Influenza, Quadrivalent, Adjuvanted (>65 Yrs FLUAD QUAD 81229) 2020, 2021    PPD 2011    Pneumococcal Conjugate (PCV-13) 2015    Pneumococcal Polysaccharide (PPSV-23) 2014, 2019    TD Vaccine 2000    Tdap 12/10/2014    Zoster Recombinant 10/01/2019    Zoster Vaccine, Live 2016     Flu indicated? Yes  Tdap indicated? No   Pneumovax indicated? No   Zostervax indicated? No   Meningococcal indicated?  No      Past Medical History:   Diagnosis Date    Arthritis     Colon polyps 3/23/2010    Hypercholesterolemia     Sleep apnea 3/23/2010      Past Surgical History:   Procedure Laterality Date    HX ORTHOPAEDIC      multiple fxs    W[place] ABDOMEN SURGERY PROC UNLISTED      ruptured spleen     Allergies   Allergen Reactions    Percocet [Oxycodone-Acetaminophen] Nausea and Vomiting    Sulfa (Sulfonamide Antibiotics) Nausea and Vomiting     Family History   Problem Relation Age of Onset    Diabetes Mother     Hypertension Mother     Heart Disease Mother     Thyroid Disease Mother     Diabetes Sister     Heart Disease Sister     Hypertension Sister     Thyroid Disease Sister     Cancer Brother         brain     Social History     Tobacco Use    Smoking status: Former Smoker     Packs/day: 0.50     Years: 50.00     Pack years: 25.00     Quit date: 2020     Years since quittin.1    Smokeless tobacco: Never Used   Substance Use Topics    Alcohol use: Yes     Comment: occasional     Depression Risk Factor Screening:     3 most recent PHQ Screens 2021   Little interest or pleasure in doing things Not at all   Feeling down, depressed, irritable, or hopeless Not at all   Total Score PHQ 2 0     Alcohol Risk Factor Screening:    Do you average more than 1 drink per night or more than 7 drinks a week: No    In the past three months have you have had more than 4 drinks containing alcohol on one occasion: No  Functional Ability and Level of Safety:   Hearing Loss  Hearing is good. Activities of Daily Living  The home contains: no safety equipment. Patient does total self care  No flowsheet data found. Ambulation: with no difficulty    Fall Risk  Fall Risk Assessment, last 12 mths 6/16/2021   Able to walk? Yes   Fall in past 12 months? 0   Do you feel unsteady? 0   Are you worried about falling 0       Abuse Screen  Abuse Screening Questionnaire 6/16/2021   Do you ever feel afraid of your partner? N   Are you in a relationship with someone who physically or mentally threatens you? N   Is it safe for you to go home? Y     Cognitive Screening   Evaluation of Cognitive Function:  Has your family/caregiver stated any concerns about your memory: no  Normal  No flowsheet data found. Patient Care Team   Patient Care Team:  Devi Leyva MD as PCP - General (Family Medicine)  Devi Leyva MD as PCP - [facility] Empaneled Provider  Assessment/Plan   Education and counseling provided:  Are appropriate based on today's review and evaluation  End-of-Life planning (with patient's consent)    Diagnoses and all orders for this visit:    1. Medicare annual wellness visit, subsequent    2.  Encounter for immunization  -     ADMIN INFLUENZA VIRUS VAC  -     FLU (FLUAD QUAD INFLUENZA VACCINE,QUAD,ADJUVANTED)        Health Maintenance Topics with due status: Overdue       Topic Date Due    COVID-19 Vaccine Never done    Low dose CT lung screening Never done    Shingrix Vaccine Age 50> 11/26/2019    Colorectal Cancer Screening Combo 03/22/2021    Breast Cancer Screen Mammogram 05/29/2021     Health Maintenance Topics with due status: Not Due       Topic Last Completion Date    DTaP/Tdap/Td series 12/10/2014    Lipid Screen 06/16/2021    Medicare Yearly Exam 09/16/2021     Health Maintenance Topics with due status: Completed       Topic Last Completion Date    Hepatitis C Screening 10/25/2010    Bone Densitometry (Dexa) Screening 05/29/2019    Pneumococcal 65+ years 06/11/2019    Flu Vaccine 09/16/2021

## 2021-12-05 ENCOUNTER — APPOINTMENT (OUTPATIENT)
Dept: CT IMAGING | Age: 66
DRG: 392 | End: 2021-12-05
Attending: EMERGENCY MEDICINE
Payer: MEDICARE

## 2021-12-05 ENCOUNTER — HOSPITAL ENCOUNTER (INPATIENT)
Age: 66
LOS: 2 days | Discharge: HOME | DRG: 392 | End: 2021-12-07
Attending: EMERGENCY MEDICINE | Admitting: INTERNAL MEDICINE
Payer: MEDICARE

## 2021-12-05 DIAGNOSIS — E78.00 HYPERCHOLESTEROLEMIA: ICD-10-CM

## 2021-12-05 DIAGNOSIS — G47.33 OSA (OBSTRUCTIVE SLEEP APNEA): ICD-10-CM

## 2021-12-05 DIAGNOSIS — K92.1 HEMATOCHEZIA: Primary | ICD-10-CM

## 2021-12-05 DIAGNOSIS — K52.9 COLITIS: ICD-10-CM

## 2021-12-05 DIAGNOSIS — R10.84 GENERALIZED ABDOMINAL PAIN: ICD-10-CM

## 2021-12-05 PROBLEM — M81.0 AGE-RELATED OSTEOPOROSIS WITHOUT CURRENT PATHOLOGICAL FRACTURE: Status: RESOLVED | Noted: 2019-06-18 | Resolved: 2021-12-05

## 2021-12-05 PROBLEM — E88.81 INSULIN RESISTANCE: Status: RESOLVED | Noted: 2019-06-18 | Resolved: 2021-12-05

## 2021-12-05 PROBLEM — D72.829 LEUKOCYTOSIS: Status: ACTIVE | Noted: 2021-12-05

## 2021-12-05 PROBLEM — G89.29 CHRONIC PAIN OF LEFT KNEE: Status: RESOLVED | Noted: 2019-05-02 | Resolved: 2021-12-05

## 2021-12-05 PROBLEM — R10.9 ABDOMINAL PAIN: Status: ACTIVE | Noted: 2021-12-05

## 2021-12-05 PROBLEM — M25.562 CHRONIC PAIN OF LEFT KNEE: Status: RESOLVED | Noted: 2019-05-02 | Resolved: 2021-12-05

## 2021-12-05 PROBLEM — M17.32 POST-TRAUMATIC OSTEOARTHRITIS OF LEFT KNEE: Status: RESOLVED | Noted: 2019-05-02 | Resolved: 2021-12-05

## 2021-12-05 PROBLEM — M41.56 OTHER SECONDARY SCOLIOSIS, LUMBAR REGION: Chronic | Status: RESOLVED | Noted: 2019-12-18 | Resolved: 2021-12-05

## 2021-12-05 PROBLEM — Z96.652 STATUS POST LEFT KNEE REPLACEMENT: Status: RESOLVED | Noted: 2021-06-16 | Resolved: 2021-12-05

## 2021-12-05 LAB
ABO + RH BLD: NORMAL
ALBUMIN SERPL-MCNC: 3.7 G/DL (ref 3.5–5)
ALBUMIN/GLOB SERPL: 1 {RATIO} (ref 1.1–2.2)
ALP SERPL-CCNC: 69 U/L (ref 45–117)
ALT SERPL-CCNC: 23 U/L (ref 12–78)
ANION GAP SERPL CALC-SCNC: 5 MMOL/L (ref 5–15)
ANION GAP SERPL CALC-SCNC: 6 MMOL/L (ref 5–15)
APPEARANCE UR: CLEAR
AST SERPL-CCNC: 15 U/L (ref 15–37)
BACTERIA URNS QL MICRO: NEGATIVE /HPF
BASOPHILS # BLD: 0.1 K/UL (ref 0–0.1)
BASOPHILS NFR BLD: 0 % (ref 0–1)
BILIRUB SERPL-MCNC: 0.6 MG/DL (ref 0.2–1)
BILIRUB UR QL: NEGATIVE
BLOOD GROUP ANTIBODIES SERPL: NORMAL
BUN SERPL-MCNC: 18 MG/DL (ref 6–20)
BUN SERPL-MCNC: 22 MG/DL (ref 6–20)
BUN/CREAT SERPL: 26 (ref 12–20)
BUN/CREAT SERPL: 27 (ref 12–20)
CALCIUM SERPL-MCNC: 8 MG/DL (ref 8.5–10.1)
CALCIUM SERPL-MCNC: 9.1 MG/DL (ref 8.5–10.1)
CHLORIDE SERPL-SCNC: 105 MMOL/L (ref 97–108)
CHLORIDE SERPL-SCNC: 108 MMOL/L (ref 97–108)
CO2 SERPL-SCNC: 25 MMOL/L (ref 21–32)
CO2 SERPL-SCNC: 27 MMOL/L (ref 21–32)
COLOR UR: ABNORMAL
COMMENT, HOLDF: NORMAL
COMMENT, HOLDF: NORMAL
CREAT SERPL-MCNC: 0.67 MG/DL (ref 0.55–1.02)
CREAT SERPL-MCNC: 0.85 MG/DL (ref 0.55–1.02)
CRP SERPL-MCNC: 1.27 MG/DL (ref 0–0.6)
DIFFERENTIAL METHOD BLD: ABNORMAL
EOSINOPHIL # BLD: 0.1 K/UL (ref 0–0.4)
EOSINOPHIL NFR BLD: 1 % (ref 0–7)
EPITH CASTS URNS QL MICRO: ABNORMAL /LPF
ERYTHROCYTE [DISTWIDTH] IN BLOOD BY AUTOMATED COUNT: 12.8 % (ref 11.5–14.5)
GLOBULIN SER CALC-MCNC: 3.6 G/DL (ref 2–4)
GLUCOSE SERPL-MCNC: 100 MG/DL (ref 65–100)
GLUCOSE SERPL-MCNC: 107 MG/DL (ref 65–100)
GLUCOSE UR STRIP.AUTO-MCNC: 100 MG/DL
HCT VFR BLD AUTO: 37.3 % (ref 35–47)
HGB BLD-MCNC: 12 G/DL (ref 11.5–16)
HGB UR QL STRIP: NEGATIVE
HYALINE CASTS URNS QL MICRO: ABNORMAL /LPF (ref 0–5)
IMM GRANULOCYTES # BLD AUTO: 0 K/UL (ref 0–0.04)
IMM GRANULOCYTES NFR BLD AUTO: 0 % (ref 0–0.5)
INR PPP: 1 (ref 0.9–1.1)
KETONES UR QL STRIP.AUTO: NEGATIVE MG/DL
LACTATE SERPL-SCNC: 0.6 MMOL/L (ref 0.4–2)
LEUKOCYTE ESTERASE UR QL STRIP.AUTO: NEGATIVE
LYMPHOCYTES # BLD: 2.7 K/UL (ref 0.8–3.5)
LYMPHOCYTES NFR BLD: 19 % (ref 12–49)
MCH RBC QN AUTO: 29.3 PG (ref 26–34)
MCHC RBC AUTO-ENTMCNC: 32.2 G/DL (ref 30–36.5)
MCV RBC AUTO: 91.2 FL (ref 80–99)
MONOCYTES # BLD: 1 K/UL (ref 0–1)
MONOCYTES NFR BLD: 7 % (ref 5–13)
NEUTS SEG # BLD: 10.3 K/UL (ref 1.8–8)
NEUTS SEG NFR BLD: 73 % (ref 32–75)
NITRITE UR QL STRIP.AUTO: NEGATIVE
NRBC # BLD: 0 K/UL (ref 0–0.01)
NRBC BLD-RTO: 0 PER 100 WBC
PH UR STRIP: 5.5 [PH] (ref 5–8)
PLATELET # BLD AUTO: 234 K/UL (ref 150–400)
PMV BLD AUTO: 10.5 FL (ref 8.9–12.9)
POTASSIUM SERPL-SCNC: 3.7 MMOL/L (ref 3.5–5.1)
POTASSIUM SERPL-SCNC: 3.8 MMOL/L (ref 3.5–5.1)
PROT SERPL-MCNC: 7.3 G/DL (ref 6.4–8.2)
PROT UR STRIP-MCNC: NEGATIVE MG/DL
PROTHROMBIN TIME: 10.7 SEC (ref 9–11.1)
RBC # BLD AUTO: 4.09 M/UL (ref 3.8–5.2)
RBC #/AREA URNS HPF: ABNORMAL /HPF (ref 0–5)
SAMPLES BEING HELD,HOLD: NORMAL
SAMPLES BEING HELD,HOLD: NORMAL
SODIUM SERPL-SCNC: 138 MMOL/L (ref 136–145)
SODIUM SERPL-SCNC: 138 MMOL/L (ref 136–145)
SP GR UR REFRACTOMETRY: 1.01 (ref 1–1.03)
SPECIMEN EXP DATE BLD: NORMAL
UR CULT HOLD, URHOLD: NORMAL
UROBILINOGEN UR QL STRIP.AUTO: 0.2 EU/DL (ref 0.2–1)
WBC # BLD AUTO: 14.1 K/UL (ref 3.6–11)
WBC URNS QL MICRO: ABNORMAL /HPF (ref 0–4)

## 2021-12-05 PROCEDURE — 83605 ASSAY OF LACTIC ACID: CPT

## 2021-12-05 PROCEDURE — 65270000029 HC RM PRIVATE

## 2021-12-05 PROCEDURE — 74011250636 HC RX REV CODE- 250/636: Performed by: EMERGENCY MEDICINE

## 2021-12-05 PROCEDURE — 85610 PROTHROMBIN TIME: CPT

## 2021-12-05 PROCEDURE — 74011250637 HC RX REV CODE- 250/637: Performed by: INTERNAL MEDICINE

## 2021-12-05 PROCEDURE — 85025 COMPLETE CBC W/AUTO DIFF WBC: CPT

## 2021-12-05 PROCEDURE — 86901 BLOOD TYPING SEROLOGIC RH(D): CPT

## 2021-12-05 PROCEDURE — 36415 COLL VENOUS BLD VENIPUNCTURE: CPT

## 2021-12-05 PROCEDURE — 80053 COMPREHEN METABOLIC PANEL: CPT

## 2021-12-05 PROCEDURE — 74011250636 HC RX REV CODE- 250/636: Performed by: INTERNAL MEDICINE

## 2021-12-05 PROCEDURE — 74011000636 HC RX REV CODE- 636: Performed by: RADIOLOGY

## 2021-12-05 PROCEDURE — 74177 CT ABD & PELVIS W/CONTRAST: CPT

## 2021-12-05 PROCEDURE — 86140 C-REACTIVE PROTEIN: CPT

## 2021-12-05 PROCEDURE — 74011000258 HC RX REV CODE- 258: Performed by: INTERNAL MEDICINE

## 2021-12-05 PROCEDURE — 87040 BLOOD CULTURE FOR BACTERIA: CPT

## 2021-12-05 PROCEDURE — 93005 ELECTROCARDIOGRAM TRACING: CPT

## 2021-12-05 PROCEDURE — 81001 URINALYSIS AUTO W/SCOPE: CPT

## 2021-12-05 PROCEDURE — 99284 EMERGENCY DEPT VISIT MOD MDM: CPT

## 2021-12-05 RX ORDER — ENOXAPARIN SODIUM 100 MG/ML
40 INJECTION SUBCUTANEOUS DAILY
Status: DISCONTINUED | OUTPATIENT
Start: 2021-12-06 | End: 2021-12-05

## 2021-12-05 RX ORDER — DEXTROSE, SODIUM CHLORIDE, AND POTASSIUM CHLORIDE 5; .45; .15 G/100ML; G/100ML; G/100ML
75 INJECTION INTRAVENOUS CONTINUOUS
Status: DISCONTINUED | OUTPATIENT
Start: 2021-12-05 | End: 2021-12-07

## 2021-12-05 RX ORDER — POLYETHYLENE GLYCOL 3350 17 G/17G
17 POWDER, FOR SOLUTION ORAL DAILY PRN
Status: DISCONTINUED | OUTPATIENT
Start: 2021-12-05 | End: 2021-12-07 | Stop reason: HOSPADM

## 2021-12-05 RX ORDER — ONDANSETRON 2 MG/ML
4 INJECTION INTRAMUSCULAR; INTRAVENOUS
Status: DISCONTINUED | OUTPATIENT
Start: 2021-12-05 | End: 2021-12-07 | Stop reason: HOSPADM

## 2021-12-05 RX ORDER — ONDANSETRON 4 MG/1
4 TABLET, ORALLY DISINTEGRATING ORAL
Status: DISCONTINUED | OUTPATIENT
Start: 2021-12-05 | End: 2021-12-07 | Stop reason: HOSPADM

## 2021-12-05 RX ORDER — ACETAMINOPHEN 325 MG/1
650 TABLET ORAL
Status: DISCONTINUED | OUTPATIENT
Start: 2021-12-05 | End: 2021-12-07 | Stop reason: HOSPADM

## 2021-12-05 RX ORDER — ACETAMINOPHEN 650 MG/1
650 SUPPOSITORY RECTAL
Status: DISCONTINUED | OUTPATIENT
Start: 2021-12-05 | End: 2021-12-07 | Stop reason: HOSPADM

## 2021-12-05 RX ADMIN — PIPERACILLIN AND TAZOBACTAM 3.38 G: 3; .375 INJECTION, POWDER, LYOPHILIZED, FOR SOLUTION INTRAVENOUS at 18:33

## 2021-12-05 RX ADMIN — IOPAMIDOL 100 ML: 755 INJECTION, SOLUTION INTRAVENOUS at 16:37

## 2021-12-05 RX ADMIN — SODIUM CHLORIDE 1000 ML: 9 INJECTION, SOLUTION INTRAVENOUS at 14:06

## 2021-12-05 RX ADMIN — ACETAMINOPHEN 650 MG: 325 TABLET ORAL at 20:41

## 2021-12-05 RX ADMIN — SODIUM CHLORIDE 1000 ML: 9 INJECTION, SOLUTION INTRAVENOUS at 18:34

## 2021-12-05 RX ADMIN — POTASSIUM CHLORIDE, DEXTROSE MONOHYDRATE AND SODIUM CHLORIDE 75 ML/HR: 150; 5; 450 INJECTION, SOLUTION INTRAVENOUS at 18:37

## 2021-12-05 NOTE — ED TRIAGE NOTES
Patient reports she started with abdominal pain last night- reports she moved her bowels twice last night and reports the second time she noted blood in her stool-    Patient reports having another bowel movement this morning that was also bloody-     Patient reports a history of GI bleed-

## 2021-12-05 NOTE — ED PROVIDER NOTES
HPI   This is a 69-year-old woman with a past medical history of hyperlipidemia and prior GI bleeding of unknown cause who presents to the emergency department due to concerns for bright red blood per rectum. Yesterday the patient developed some generalized abdominal cramping and had an episode of vomiting. She felt like she had to go to the bathroom when she noticed that there was blood on the toilet paper when she wiped. She did noted a small amount of blood in the toilet. She showed me a picture of this and the water in the toilet appears mostly pink with no large clots. She says she has had 2 more episodes of this. Otherwise she states she feels in her normal state of health and has not had any more abdominal cramping. Last year she had an episode of syncope and was found to have a hemoglobin in the threes at an outside hospital.  She ultimately underwent an endoscopy as well as colonoscopy, as well as a capsule endoscopy that did not show any source of her bleed. She has not anticoagulated. Previous GI bleed occurred in the setting of her taking NSAIDs and aspirin after orthopedic surgery. She has been taking Aleve for the past 2 days but has not been taking any NSAIDs before this. She has no longer taking aspirin. She denies fevers or chills. She denies hematuria. There was no blood in her vomit.   Past Medical History:   Diagnosis Date    Arthritis     Colon polyps 3/23/2010    Hypercholesterolemia     Sleep apnea 3/23/2010       Past Surgical History:   Procedure Laterality Date    HX ORTHOPAEDIC      multiple fxs    [place] ABDOMEN SURGERY PROC UNLISTED      ruptured spleen         Family History:   Problem Relation Age of Onset    Diabetes Mother     Hypertension Mother     Heart Disease Mother     Thyroid Disease Mother     Diabetes Sister     Heart Disease Sister     Hypertension Sister     Thyroid Disease Sister     Cancer Brother         brain       Social History Socioeconomic History    Marital status:      Spouse name: Not on file    Number of children: Not on file    Years of education: Not on file    Highest education level: Not on file   Occupational History    Not on file   Tobacco Use    Smoking status: Former Smoker     Packs/day: 0.50     Years: 50.00     Pack years: 25.00     Quit date: 2020     Years since quittin.3    Smokeless tobacco: Never Used   Substance and Sexual Activity    Alcohol use: Yes     Comment: occasional    Drug use: No    Sexual activity: Not on file   Other Topics Concern    Not on file   Social History Narrative    Not on file     Social Determinants of Health     Financial Resource Strain:     Difficulty of Paying Living Expenses: Not on file   Food Insecurity:     Worried About [address] in the Last Year: Not on file    [address] [place] in the Last Year: Not on file   Transportation Needs:     Lack of Transportation (Medical): Not on file    Lack of Transportation (Non-Medical):  Not on file   Physical Activity:     Days of Exercise per Week: Not on file    Minutes of Exercise per Session: Not on file   Stress:     Feeling of Stress : Not on file   Social Connections:     Frequency of Communication with Friends and Family: Not on file    Frequency of Social Gatherings with Friends and Family: Not on file    Attends Zoroastrian Services: Not on file    Active Member of [address] [place] [address] or Organizations: Not on file    Attends Club or Organization Meetings: Not on file    Marital Status: Not on file   Intimate Partner Violence:     Fear of Current or Ex-Partner: Not on file    Emotionally Abused: Not on file    Physically Abused: Not on file    Sexually Abused: Not on file   Housing Stability:     Unable to Pay for Housing in the Last Year: Not on file    Number of [place] in the Last Year: Not on file    Unstable Housing in the Last Year: Not on file         ALLERGIES: Percocet [oxycodone-acetaminophen] and Sulfa (sulfonamide antibiotics)    Review of Systems   A complete review of systems was performed. All systems reviewed were negative unless documented in the HPI. Vitals:    12/05/21 1313   BP: 135/70   Pulse: 67   Resp: 16   Temp: 98.6 °F (37 °C)   SpO2: 99%   Weight: 62.1 kg (137 lb)   Height: 5' 1\" (1.549 m)            Physical Exam  Constitutional:       Comments: Appears well. No acute distress noted   Eyes:      Extraocular Movements: Extraocular movements intact. Comments: No conjunctival pallor   Neck:      Comments: Trachea midline. No JVD  Cardiovascular:      Comments: Regular rate and rhythm without murmurs. 2+ radial pulses bilaterally  Pulmonary:      Effort: Pulmonary effort is normal. No respiratory distress. Breath sounds: Normal breath sounds. No wheezing or rales. Abdominal:      General: There is no distension. Palpations: Abdomen is soft. Tenderness: There is no abdominal tenderness. Comments: Normal bowel sounds   Genitourinary:     Comments: Digital rectal exam performed with chaperone at bedside. Nonthrombosed external hemorrhoids present. There is some gross blood mixed in with the patient's stool but no melena  Musculoskeletal:         General: No deformity. Normal range of motion. Skin:     General: Skin is warm and dry. Coloration: Skin is not pale. Neurological:      Comments: Awake and alert. Speech is normal.  GCS 15          MDM   50-year-old woman who presents with the above chief complaint. She has stable vital signs. She appears well. I do not see any obvious evidence of anemia grossly on her exam.  Digital rectal exam showed a small amount of bright red blood mixed in with some stool but no melena. She does not have melena on exam despite her chief complaint on the track board. She has no abdominal tenderness on exam.  I am going to check labs and a urinalysis.   I do not see any indication for imaging at this time. The patient seems well and stable right now, and if her hemoglobin is reassuring, I feel she can safely follow-up with her GI doctor on outpatient basis. Disposition may change on the basis of her results. Patient's hemoglobin is stable but I did note that she had a white count of 14 so decided to order a CT abdomen and pelvis. Per radiology's report she has severe colitis. Unclear whether this is infectious in nature or inflammatory. She did have another episode of bright red blood per rectum in the emergency department. Given her colitis with associated bleeding opted to admit the patient for GI consult and further management. Perfect Serve Consult for Admission  5:33 PM    ED Room Number: BN60/23  Patient Name and age: Anastasiya Roman 78 y.o.  female  Working Diagnosis: No diagnosis found. COVID-19 Suspicion:  no  Sepsis present:  no  Reassessment needed: yes  Code Status:  Full Code  Readmission: no  Isolation Requirements:  no  Recommended Level of Care:  med/surg  Department:  [facility] ED - [phone]    Other: Patient with bright red blood per rectum. Hemoglobin stable but still having bloody bowel movements in the ER. CT shows severe colitis per radiology's report.     Procedures

## 2021-12-05 NOTE — H&P
[facility]    Hospitalist Admission Note      NAME:  Mikey Barreto   :   1955   MRN:  [id number]     PCP:  Jordan Cordova MD     Date/Time of service:  2021 5:49 PM          Subjective:     CHIEF COMPLAINT: abd pain and BRBPR     HISTORY OF PRESENT ILLNESS:     Ms. Tiarra Schwab is a 78 y.o.  female who presented to the Emergency Department complaining of abd pain and BRBPR. New. ER finds active bleeding, but no anemia. CT finds colitis around the splenic transverse/ descending flexure. She does not meet sepsis criteria. I suspect ischemic colitis. We will admit her for management. Past Medical History:   Diagnosis Date    Arthritis     Colon polyps 3/23/2010    Hypercholesterolemia     KAVON (obstructive sleep apnea) 2010        Past Surgical History:   Procedure Laterality Date    HX ORTHOPAEDIC      multiple fxs    [place] ABDOMEN SURGERY PROC UNLISTED      ruptured spleen       Social History     Tobacco Use    Smoking status: Former Smoker     Packs/day: 0.50     Years: 50.00     Pack years: 25.00     Quit date: 2020     Years since quittin.3    Smokeless tobacco: Never Used   Substance Use Topics    Alcohol use: Yes     Comment: occasional        Family History   Problem Relation Age of Onset    Diabetes Mother     Hypertension Mother     Heart Disease Mother     Thyroid Disease Mother     Diabetes Sister     Heart Disease Sister     Hypertension Sister     Thyroid Disease Sister     Cancer Brother         brain      Allergies   Allergen Reactions    Percocet [Oxycodone-Acetaminophen] Nausea and Vomiting    Sulfa (Sulfonamide Antibiotics) Nausea and Vomiting        Prior to Admission medications    Medication Sig Start Date End Date Taking?  Authorizing Provider   gabapentin (NEURONTIN) 300 mg capsule TAKE 2 CAPSULES BY MOUTH EVERY NIGHT 10/20/21   Jordan Cordova MD   alendronate (FOSAMAX) 70 mg tablet TAKE ONE TABLET BY MOUTH EVERY 7 DAYS 10/17/21   Chris Cormier MD   lisinopriL (PRINIVIL, ZESTRIL) 5 mg tablet TAKE ONE TABLET BY MOUTH EVERY DAY 10/15/21   Chris Cormier MD   ZINC ACETATE PO Take  by mouth. Provider, Historical   rosuvastatin (CRESTOR) 10 mg tablet TAKE ONE TABLET BY MOUTH NIGHTLY 8/18/21   Chris Cormier MD   ondansetron (ZOFRAN ODT) 8 mg disintegrating tablet Take 0.5 Tabs by mouth every eight (8) hours as needed for Nausea. Patient not taking: Reported on 6/16/2021 7/23/20   Jen Canavan, MD   cholecalciferol, VITAMIN D3, (VITAMIN D3) 5,000 unit tab tablet Take 1 Tab by mouth daily. 6/18/19   Andrew Hutton MD   calcium carbonate (CALTREX) 600 mg calcium (1,500 mg) tablet Take 1 Tab by mouth two (2) times a day.  Indications: osteoporosis  Patient not taking: Reported on 6/16/2021 6/18/19   Andrew Hutton MD       Review of Systems:  (bold if positive, if negative)    Gen:  Eyes:  ENT:  CVS:  Pulm:  GI:  :  MS:  Skin:  Psych:  Endo:  Hem:  Renal:  Neuro:        Objective:      VITALS:    Vital signs reviewed; most recent are:    Visit Vitals  BP (!) 155/63   Pulse 67   Temp 98.6 °F (37 °C)   Resp 16   Ht 5' 1\" (1.549 m)   Wt 62.1 kg (137 lb)   SpO2 96%   BMI 25.89 kg/m²     SpO2 Readings from Last 6 Encounters:   12/05/21 96%   09/16/21 99%   06/16/21 99%   07/24/20 96%   07/16/20 99%   06/03/20 97%        No intake or output data in the 24 hours ending 12/05/21 9088     Exam:     Physical Exam:    Gen:  Well-developed, well-nourished, in no acute distress  HEENT:  Pink conjunctivae, PERRL, hearing intact to voice, moist mucous membranes  Neck:  Supple, without masses, thyroid non-tender  Resp:  No accessory muscle use, clear breath sounds without wheezes rales or rhonchi  Card:  No murmurs, normal S1, S2 without thrills, bruits or peripheral edema  Abd:  Soft, non-tender, non-distended, normoactive bowel sounds are present, no mass  Lymph:  No cervical or inguinal adenopathy  Musc:  No cyanosis or clubbing  Skin:  No rashes or ulcers, skin turgor is good  Neuro:  Cranial nerves are grossly intact, no focal motor weakness, follows commands appropriately  Psych:  Good insight, oriented to person, place and time, alert     Labs:    Recent Labs     12/05/21  1336   WBC 14.1*   HGB 12.0   HCT 37.3        Recent Labs     12/05/21  1336      K 3.8      CO2 27   *   BUN 22*   CREA 0.85   CA 9.1   ALB 3.7   TBILI 0.6   ALT 23     No results found for: GLUCPOC  No results for input(s): PH, PCO2, PO2, HCO3, FIO2 in the last 72 hours. Recent Labs     12/05/21  1339   INR 1.0     All Micro Results     Procedure Component Value Units Date/Time    CULTURE, BLOOD, PAIRED [[id number]]     Order Status: Sent Specimen: Blood     URINE CULTURE HOLD SAMPLE [[id number]] Collected: 12/05/21 1339    Order Status: Completed Specimen: Serum Updated: 12/05/21 1354     Urine culture hold       Urine on hold in Microbiology dept for 2 days. If unpreserved urine is submitted, it cannot be used for addtional testing after 24 hours, recollection will be required. I have reviewed previous records       Assessment and Plan:      Colitis / Leukocytosis / Abdominal pain / Hematochezia / HX Colon polyps - POA, Unclear etiology, but I suspect ischmic colitis first, infectious less, and inflammatory least.  For now empirically start IV Zosyn and and IVF. Consult GI. Sips for now. No anticoagulation. Add PPI        Hypertension - Hold lisinopril while hydrating. Arthritis / DDD (degenerative disc disease), lumbar - Tylenol prn. No NSAIDS while bleeding. Hold Neurontin. KAVON (obstructive sleep apnea) - May use home CPAP if available. Hypercholesterolemia - Resume rosuvastatin when eating    Osteoporosis - Hold alendronate. PPI.       Telemetry reviewed:   normal sinus rhythm    Risk of deterioration: high      Total time spent with patient: 50 Minutes I personally reviewed chart, notes, data and current medications in the medical record. I have personally examined and treated the patient at bedside during this period.                  Care Plan discussed with: Patient, Nursing Staff and >50% of time spent in counseling and coordination of care    Discussed:  Care Plan       ___________________________________________________    Attending Physician: Fito Wheat MD

## 2021-12-06 LAB
ALBUMIN SERPL-MCNC: 2.9 G/DL (ref 3.5–5)
ALBUMIN/GLOB SERPL: 0.9 {RATIO} (ref 1.1–2.2)
ALP SERPL-CCNC: 55 U/L (ref 45–117)
ALT SERPL-CCNC: 16 U/L (ref 12–78)
ANION GAP SERPL CALC-SCNC: 4 MMOL/L (ref 5–15)
AST SERPL-CCNC: 13 U/L (ref 15–37)
BASOPHILS # BLD: 0.1 K/UL (ref 0–0.1)
BASOPHILS NFR BLD: 0 % (ref 0–1)
BILIRUB SERPL-MCNC: 0.6 MG/DL (ref 0.2–1)
BUN SERPL-MCNC: 14 MG/DL (ref 6–20)
BUN/CREAT SERPL: 20 (ref 12–20)
CALCIUM SERPL-MCNC: 7.9 MG/DL (ref 8.5–10.1)
CHLORIDE SERPL-SCNC: 113 MMOL/L (ref 97–108)
CO2 SERPL-SCNC: 24 MMOL/L (ref 21–32)
CREAT SERPL-MCNC: 0.71 MG/DL (ref 0.55–1.02)
DIFFERENTIAL METHOD BLD: ABNORMAL
EOSINOPHIL # BLD: 0.1 K/UL (ref 0–0.4)
EOSINOPHIL NFR BLD: 1 % (ref 0–7)
ERYTHROCYTE [DISTWIDTH] IN BLOOD BY AUTOMATED COUNT: 12.8 % (ref 11.5–14.5)
ERYTHROCYTE [DISTWIDTH] IN BLOOD BY AUTOMATED COUNT: 12.8 % (ref 11.5–14.5)
GLOBULIN SER CALC-MCNC: 3.1 G/DL (ref 2–4)
GLUCOSE SERPL-MCNC: 118 MG/DL (ref 65–100)
HCT VFR BLD AUTO: 30.5 % (ref 35–47)
HCT VFR BLD AUTO: 31.2 % (ref 35–47)
HGB BLD-MCNC: 10.1 G/DL (ref 11.5–16)
HGB BLD-MCNC: 9.9 G/DL (ref 11.5–16)
IMM GRANULOCYTES # BLD AUTO: 0 K/UL (ref 0–0.04)
IMM GRANULOCYTES NFR BLD AUTO: 0 % (ref 0–0.5)
LYMPHOCYTES # BLD: 1.8 K/UL (ref 0.8–3.5)
LYMPHOCYTES NFR BLD: 16 % (ref 12–49)
MAGNESIUM SERPL-MCNC: 1.8 MG/DL (ref 1.6–2.4)
MCH RBC QN AUTO: 29.4 PG (ref 26–34)
MCH RBC QN AUTO: 29.8 PG (ref 26–34)
MCHC RBC AUTO-ENTMCNC: 32.4 G/DL (ref 30–36.5)
MCHC RBC AUTO-ENTMCNC: 32.5 G/DL (ref 30–36.5)
MCV RBC AUTO: 91 FL (ref 80–99)
MCV RBC AUTO: 91.9 FL (ref 80–99)
MONOCYTES # BLD: 0.6 K/UL (ref 0–1)
MONOCYTES NFR BLD: 6 % (ref 5–13)
NEUTS SEG # BLD: 8.7 K/UL (ref 1.8–8)
NEUTS SEG NFR BLD: 77 % (ref 32–75)
NRBC # BLD: 0 K/UL (ref 0–0.01)
NRBC # BLD: 0 K/UL (ref 0–0.01)
NRBC BLD-RTO: 0 PER 100 WBC
NRBC BLD-RTO: 0 PER 100 WBC
PLATELET # BLD AUTO: 179 K/UL (ref 150–400)
PLATELET # BLD AUTO: 195 K/UL (ref 150–400)
PMV BLD AUTO: 10.9 FL (ref 8.9–12.9)
PMV BLD AUTO: 11.2 FL (ref 8.9–12.9)
POTASSIUM SERPL-SCNC: 3.9 MMOL/L (ref 3.5–5.1)
PROCALCITONIN SERPL-MCNC: <0.05 NG/ML
PROT SERPL-MCNC: 6 G/DL (ref 6.4–8.2)
RBC # BLD AUTO: 3.32 M/UL (ref 3.8–5.2)
RBC # BLD AUTO: 3.43 M/UL (ref 3.8–5.2)
SODIUM SERPL-SCNC: 141 MMOL/L (ref 136–145)
WBC # BLD AUTO: 11 K/UL (ref 3.6–11)
WBC # BLD AUTO: 11.2 K/UL (ref 3.6–11)

## 2021-12-06 PROCEDURE — 65270000029 HC RM PRIVATE

## 2021-12-06 PROCEDURE — 85025 COMPLETE CBC W/AUTO DIFF WBC: CPT

## 2021-12-06 PROCEDURE — 85027 COMPLETE CBC AUTOMATED: CPT

## 2021-12-06 PROCEDURE — 74011250636 HC RX REV CODE- 250/636: Performed by: STUDENT IN AN ORGANIZED HEALTH CARE EDUCATION/TRAINING PROGRAM

## 2021-12-06 PROCEDURE — C9113 INJ PANTOPRAZOLE SODIUM, VIA: HCPCS | Performed by: STUDENT IN AN ORGANIZED HEALTH CARE EDUCATION/TRAINING PROGRAM

## 2021-12-06 PROCEDURE — 83735 ASSAY OF MAGNESIUM: CPT

## 2021-12-06 PROCEDURE — 74011000258 HC RX REV CODE- 258: Performed by: INTERNAL MEDICINE

## 2021-12-06 PROCEDURE — 74011250636 HC RX REV CODE- 250/636: Performed by: INTERNAL MEDICINE

## 2021-12-06 PROCEDURE — 84145 PROCALCITONIN (PCT): CPT

## 2021-12-06 PROCEDURE — 36415 COLL VENOUS BLD VENIPUNCTURE: CPT

## 2021-12-06 PROCEDURE — 99232 SBSQ HOSP IP/OBS MODERATE 35: CPT | Performed by: FAMILY MEDICINE

## 2021-12-06 PROCEDURE — 80053 COMPREHEN METABOLIC PANEL: CPT

## 2021-12-06 RX ADMIN — PIPERACILLIN AND TAZOBACTAM 3.38 G: 3; .375 INJECTION, POWDER, LYOPHILIZED, FOR SOLUTION INTRAVENOUS at 16:00

## 2021-12-06 RX ADMIN — PIPERACILLIN AND TAZOBACTAM 3.38 G: 3; .375 INJECTION, POWDER, LYOPHILIZED, FOR SOLUTION INTRAVENOUS at 23:53

## 2021-12-06 RX ADMIN — PIPERACILLIN AND TAZOBACTAM 3.38 G: 3; .375 INJECTION, POWDER, LYOPHILIZED, FOR SOLUTION INTRAVENOUS at 00:52

## 2021-12-06 RX ADMIN — PANTOPRAZOLE SODIUM 40 MG: 40 INJECTION, POWDER, FOR SOLUTION INTRAVENOUS at 10:48

## 2021-12-06 RX ADMIN — PIPERACILLIN AND TAZOBACTAM 3.38 G: 3; .375 INJECTION, POWDER, LYOPHILIZED, FOR SOLUTION INTRAVENOUS at 10:47

## 2021-12-06 RX ADMIN — ONDANSETRON 4 MG: 2 INJECTION INTRAMUSCULAR; INTRAVENOUS at 10:48

## 2021-12-06 NOTE — PROGRESS NOTES
[address] [facility] [address] [place] [address]   Office [phone]  Fax [phone]          Assessment and Plan     Damir Montana is a 78 y.o. female with PMH HTN, OA, HLD, KAVON of admitted for severe colitis. Severe Colitis: Symptom onset since 12/04. BRBPR and diarrhea still present that started 1 hour after [employer]rim[employer] consumption on 12/03. Ct abd/pelvis shows severe colitis of the left colon, involving the distal transverse colon, splenic flexure, and much of the descending colon. There is also diverticulosis of the sigmoid colon. Leukocytosis on admission, now improving with ABXs and IVF. LA neg. Procal <0.05. CRP: 1.27. Hgb:10.1 (BL: 12) Unclear etiology at this point, but likely inflammatory vs infectious vs ischemic  Reports similar episodes on 08/2020 at [facility]. Required 4pRBC transfusions and was admitted at the ICU. Had EDG showed hiatal hernia and duodenitis)  and Colonoscopy (which showed 4 mm polyp). Capsule endoscopy showed 2 small AVM but no active source of bleeding. S/p Zosyn x 1 & 2L NS in the ED.  - Consult GI, will follow up with recs. - Continue IV Zosyn. - Protonix 40 mg IV daily.  - Trend CBC q 12. - Zofran prn for Nausea. - Continue MIVF, clear sips for now. - F/u on Bcx.  - Enteric panel  - Hold anticoagulation. Anemia: POA: 10.1 (BL: 12) likely 2/2 to active GI bleeding. Type and screen already collected. - Continue monitoring vital signs.  - CBC q 12 hrs. - Transfuse of Hgb <7    HTN: POA: 135/70  - Will hold home Lisinopril 5 mg daily.   - Continue monitoring and adjust as needed    Hypercholesterolemia:  Lab Results   Component Value Date/Time    Cholesterol, total 155 06/16/2021 09:10 AM    HDL Cholesterol 83 06/16/2021 09:10 AM    LDL, calculated 60.6 06/16/2021 09:10 AM    VLDL, calculated 11.4 06/16/2021 09:10 AM    Triglyceride 57 06/16/2021 09:10 AM    CHOL/HDL Ratio 1.9 06/16/2021 09:10 AM     -  Resume rosuvastatin when eating    Osteoporosis:   - Hold alendronate. PPI. KAVON (obstructive sleep apnea)   - May use home CPAP if available. FEN/GI - NPO with clear sips. D5%-0.45% naCL w/ KCL 20 mEq/l at 75 mL/hr. Activity - Ambulate as tolerated  DVT prophylaxis - Hold anticoagulation  GI prophylaxis - Protonix  Fall prophylaxis - Not indicated at this time. Disposition - Admit to Medical. Plan to d/c to Home. Code Status - Full. Discussed with patient / caregivers. Next of Charan George Name and Contact Luis Gaston (Other Relative)   [phone]     Patient discussed with Dr. Cathryne Boast, MD  Family Medicine Resident         Subjective / Objective     Subjective: Pt was seen and examined at bedside. Afebrile and hemodynamically stable. Concerns overnight include: none. The patient denies any significant abdominal pain. Continues to endorse bloody stools and diarrhea, now endorsing \"it's getting harder\". She denies chest pain, SOB, nausea, vomiting, abdominal pain, dizziness, dysuria or headaches. Objective:    Physical Exam:  General: No acute distress. Alert. Cooperative. Oral Mucosa appears hydrated. Respiratory: CTAB. No w/r/r/c.   Cardiovascular: Normal S1,S2. No m/r/g.   GI: Nondistended.+ bowel sounds. Nontender. No rebound tenderness or guarding. Kohli negative. Tympanic on ascultation. Extremities: No LE edema. Skin: Warm, dry. No rashes.    Neuro: Alert and oriented    Respiratory:   O2 Device: None (Room air)   Visit Vitals  BP (!) 132/59   Pulse 65   Temp 98.8 °F (37.1 °C)   Resp 18   Ht 5' 1\" (1.549 m)   Wt 137 lb (62.1 kg)   SpO2 94%   BMI 25.89 kg/m²       I/O:      Inpatient Medications  Current Facility-Administered Medications   Medication Dose Route Frequency    pantoprazole (PROTONIX) 40 mg in 0.9% sodium chloride 10 mL injection  40 mg IntraVENous DAILY    acetaminophen (TYLENOL) tablet 650 mg  650 mg Oral Q6H PRN    Or    acetaminophen (TYLENOL) suppository 650 mg  650 mg Rectal Q6H PRN    polyethylene glycol (MIRALAX) packet 17 g  17 g Oral DAILY PRN    ondansetron (ZOFRAN ODT) tablet 4 mg  4 mg Oral Q8H PRN    Or    ondansetron (ZOFRAN) injection 4 mg  4 mg IntraVENous Q6H PRN    dextrose 5% - 0.45% NaCl with KCl 20 mEq/L infusion  75 mL/hr IntraVENous CONTINUOUS    piperacillin-tazobactam (ZOSYN) 3.375 g in 0.9% sodium chloride (MBP/ADV) 100 mL MBP  3.375 g IntraVENous Q8H     Current Outpatient Medications   Medication Sig    gabapentin (NEURONTIN) 300 mg capsule TAKE 2 CAPSULES BY MOUTH EVERY NIGHT    alendronate (FOSAMAX) 70 mg tablet TAKE ONE TABLET BY MOUTH EVERY 7 DAYS    lisinopriL (PRINIVIL, ZESTRIL) 5 mg tablet TAKE ONE TABLET BY MOUTH EVERY DAY    ZINC ACETATE PO Take  by mouth.  rosuvastatin (CRESTOR) 10 mg tablet TAKE ONE TABLET BY MOUTH NIGHTLY    ondansetron (ZOFRAN ODT) 8 mg disintegrating tablet Take 0.5 Tabs by mouth every eight (8) hours as needed for Nausea. (Patient not taking: Reported on 6/16/2021)    cholecalciferol, VITAMIN D3, (VITAMIN D3) 5,000 unit tab tablet Take 1 Tab by mouth daily.  calcium carbonate (CALTREX) 600 mg calcium (1,500 mg) tablet Take 1 Tab by mouth two (2) times a day.  Indications: osteoporosis (Patient not taking: Reported on 6/16/2021)         Allergies  Allergies   Allergen Reactions    Percocet [Oxycodone-Acetaminophen] Nausea and Vomiting    Sulfa (Sulfonamide Antibiotics) Nausea and Vomiting         CBC:  Recent Labs     12/06/21  0047 12/05/21  1336   WBC 11.0 14.1*   HGB 10.1* 12.0   HCT 31.2* 37.3    978       Metabolic Panel:  Recent Labs     12/06/21  0047 12/05/21  1757 12/05/21  1339 12/05/21  1336    138  --  138   K 3.9 3.7  --  3.8   * 108  --  105   CO2 24 25  --  27   BUN 14 18  --  22*   CREA 0.71 0.67  --  0.85   * 100  --  107*   CA 7.9* 8.0*  --  9.1   MG 1.8  --   --   --    ALB 2.9*  --   --  3.7   ALT 16  --   --  23   INR  --   --  1.0  --        Imaging/Diagnostic Studies:  Results from Hospital Encounter encounter on 07/23/20    XR KNEE LT MAX 2 VWS    Narrative  INDICATION: Knee replacement. Primary osteoarthritis of left knee. FINDINGS: Portable AP and crosstable lateral radiographs of the left knee  demonstrate status post total knee arthroplasty. Alignment is anatomic. No  complication is evident. Postoperative changes are seen in the anterior soft  tissues. Impression  IMPRESSION: Status post left total knee arthroplasty. No evidence of  complication. No results found for this or any previous visit. Results from [place] encounter on 12/05/21    CT ABD PELV W CONT    Narrative  EXAM: CT ABD PELV W CONT    INDICATION: diffuse abdominal pain and bright red blood per rectum    COMPARISON: None    CONTRAST: 100 mL of Isovue-370. TECHNIQUE:  Following the uneventful intravenous administration of contrast, thin axial  images were obtained through the abdomen and pelvis. Coronal and sagittal  reconstructions were generated. Oral contrast was not administered. CT dose  reduction was achieved through use of a standardized protocol tailored for this  examination and automatic exposure control for dose modulation. FINDINGS:  LOWER THORAX: No significant abnormality in the incidentally imaged lower chest.  LIVER: No mass. BILIARY TREE: There are multiple layering gallstones in the gallbladder. CBD is  not dilated. SPLEEN: within normal limits. PANCREAS: No mass or ductal dilatation. ADRENALS: Unremarkable. KIDNEYS: No mass, calculus, or hydronephrosis. STOMACH: Unremarkable. SMALL BOWEL: No dilatation or wall thickening. COLON: Abnormal bowel wall thickening of the distal transverse colon, as well as  the splenic flexure and much of the descending colon. Diverticulosis of the  sigmoid colon, without evidence of acute diverticulitis of the sigmoid colon. APPENDIX: Normal  PERITONEUM: No ascites or pneumoperitoneum.   RETROPERITONEUM: No lymphadenopathy or aortic aneurysm. There is diffuse  atherosclerosis of the aorta. REPRODUCTIVE ORGANS: Multiple uterine fibroids are noted. URINARY BLADDER: No mass or calculus. BONES: No destructive bone lesion. ABDOMINAL WALL: No mass or hernia. ADDITIONAL COMMENTS: N/A    Impression  1. Severe colitis of the left colon, involving the distal transverse colon,  splenic flexure, and much of the descending colon. This is likely infectious or  inflammatory in etiology. 2. No evidence of bowel perforation or obstruction. 3. Multiple uterine fibroids. 4. Diverticulosis of the sigmoid colon. 5. Cholelithiasis. No valid procedures specified. For Billing    Chief Complaint   Patient presents with   [facility] Problems  Date Reviewed: 12/5/2021          Codes Class Noted POA    * (Principal) Colitis ICD-10-CM: K52.9  ICD-9-CM: 558.9  12/5/2021 Yes        Hypercholesterolemia ICD-10-CM: E78.00  ICD-9-CM: 272.0  Unknown Yes        Arthritis ICD-10-CM: M19.90  ICD-9-CM: 716.90  Unknown Yes        Leukocytosis ICD-10-CM: D72.829  ICD-9-CM: 288.60  12/5/2021 Yes        Abdominal pain ICD-10-CM: R10.9  ICD-9-CM: 789.00  12/5/2021 Yes        Hematochezia ICD-10-CM: K92.1  ICD-9-CM: 578.1  12/5/2021 Yes        DDD (degenerative disc disease), lumbar (Chronic) ICD-10-CM: M51.36  ICD-9-CM: 722.52  12/18/2019 Yes        Hypertension ICD-10-CM: I10  ICD-9-CM: 401.9  6/18/2019 Yes        Colon polyps ICD-10-CM: K63.5  ICD-9-CM: 211.3  3/23/2010 Yes    Overview Addendum 3/18/2021  9:28 AM by BULL Broderick Dr. 2009. Due 2014. Formatting of this note might be different from the original.  Dr. Luis Felipe Mcguire 2009. Due 2014.              KAVON (obstructive sleep apnea) ICD-10-CM: G47.33  ICD-9-CM: 327.23  3/23/2010 Yes

## 2021-12-06 NOTE — CONSULTS
[address] [place] [address]. [address] [place] [address] NP  [phone]                    GASTROENTEROLOGY CONSULTATION NOTE              NAME:  Ella Blair   :   1955   MRN:   [id number]       Referring Physician:   Dr. Felice Bell Date:   2021 3:51 PM    Chief Complaint:    Colitis     History of Present Illness:    Patient is a 78 y.o. who we have been asked to see in consultation for the above complaint. The patient presented to the ER for complaints of rectal bleeding and abdominal pain. Prior to the bleeding she passed a hard stool followed by lose stools. She also reports an episode of vomiting. Her work up this admission includes a CT scan showing severe left sided colitis. Her last colonoscopy was last year at [facility]. PMH:  Past Medical History:   Diagnosis Date    Arthritis     Colon polyps 3/23/2010    Hypercholesterolemia     KAVON (obstructive sleep apnea) 2010       PSH:  Past Surgical History:   Procedure Laterality Date    HX ORTHOPAEDIC      multiple fxs    IA ABDOMEN SURGERY PROC UNLISTED      ruptured spleen       Allergies: Allergies   Allergen Reactions    Percocet [Oxycodone-Acetaminophen] Nausea and Vomiting    Sulfa (Sulfonamide Antibiotics) Nausea and Vomiting       Home Medications:  Prior to Admission Medications   Prescriptions Last Dose Informant Patient Reported? Taking? ZINC ACETATE PO   Yes No   Sig: Take  by mouth. alendronate (FOSAMAX) 70 mg tablet   No No   Sig: TAKE ONE TABLET BY MOUTH EVERY 7 DAYS   calcium carbonate (CALTREX) 600 mg calcium (1,500 mg) tablet   No No   Sig: Take 1 Tab by mouth two (2) times a day. Indications: osteoporosis   Patient not taking: Reported on 2021   cholecalciferol, VITAMIN D3, (VITAMIN D3) 5,000 unit tab tablet   Yes No   Sig: Take 1 Tab by mouth daily.    gabapentin (NEURONTIN) 300 mg capsule   No No   Sig: TAKE 2 CAPSULES BY MOUTH EVERY NIGHT   lisinopriL (PRINIVIL, ZESTRIL) 5 mg tablet No No   Sig: TAKE ONE TABLET BY MOUTH EVERY DAY   ondansetron (ZOFRAN ODT) 8 mg disintegrating tablet   No No   Sig: Take 0.5 Tabs by mouth every eight (8) hours as needed for Nausea.    Patient not taking: Reported on 2021   rosuvastatin (CRESTOR) 10 mg tablet   No No   Sig: TAKE ONE TABLET BY MOUTH NIGHTLY      Facility-Administered Medications: None       Hospital Medications:  Current Facility-Administered Medications   Medication Dose Route Frequency    pantoprazole (PROTONIX) 40 mg in 0.9% sodium chloride 10 mL injection  40 mg IntraVENous DAILY    acetaminophen (TYLENOL) tablet 650 mg  650 mg Oral Q6H PRN    Or    acetaminophen (TYLENOL) suppository 650 mg  650 mg Rectal Q6H PRN    polyethylene glycol (MIRALAX) packet 17 g  17 g Oral DAILY PRN    ondansetron (ZOFRAN ODT) tablet 4 mg  4 mg Oral Q8H PRN    Or    ondansetron (ZOFRAN) injection 4 mg  4 mg IntraVENous Q6H PRN    dextrose 5% - 0.45% NaCl with KCl 20 mEq/L infusion  75 mL/hr IntraVENous CONTINUOUS    piperacillin-tazobactam (ZOSYN) 3.375 g in 0.9% sodium chloride (MBP/ADV) 100 mL MBP  3.375 g IntraVENous Q8H       Social History:  Social History     Tobacco Use    Smoking status: Former Smoker     Packs/day: 0.50     Years: 50.00     Pack years: 25.00     Quit date: 2020     Years since quittin.3    Smokeless tobacco: Never Used   Substance Use Topics    Alcohol use: Yes     Comment: occasional       Family History:  Family History   Problem Relation Age of Onset    Diabetes Mother     Hypertension Mother     Heart Disease Mother     Thyroid Disease Mother     Diabetes Sister     Heart Disease Sister     Hypertension Sister     Thyroid Disease Sister     Cancer Brother         brain       Review of Systems:  Constitutional: negative fever, negative chills, negative weight loss  Eyes:   negative visual changes  ENT:   negative sore throat, tongue or lip swelling  Respiratory:  negative cough, negative dyspnea  Cards: negative for chest pain, palpitations, lower extremity edema  GI:   See HPI  :  negative for frequency, dysuria  Integument:  negative for rash and pruritus  Heme:  negative for easy bruising and gum/nose bleeding  Musculoskel: negative for myalgias,  back pain and muscle weakness  Neuro: negative for headaches, dizziness, vertigo  Psych:  negative for feelings of anxiety, depression     Objective:     Patient Vitals for the past 8 hrs:   BP Temp Pulse Resp SpO2   12/06/21 1521 (!) 132/58 98.9 °F (37.2 °C) 64 18 99 %   12/06/21 1232 (!) 127/52 98 °F (36.7 °C) 61 18 94 %   12/06/21 1159 (!) 132/59 98.8 °F (37.1 °C) 65 18 --     12/06 0701 - 12/06 1900  In: 475 [I.V.:475]  Out: -   No intake/output data recorded. EXAM:     NEURO-alert, oriented x3, affect appropriate   HEENT-Head: Normocephalic, no lesions, without obvious abnormality. LUNGS-clear to auscultation bilaterally    COR-regular rate and rhythym     ABD- soft, non-tender. Bowel sounds normal. No masses,  no organomegaly     EXT-no edema    Skin - No rash     Data Review     Recent Labs     12/06/21  1118 12/06/21  0047   WBC 11.2* 11.0   HGB 9.9* 10.1*   HCT 30.5* 31.2*    195     Recent Labs     12/06/21  0047 12/05/21  1757    138   K 3.9 3.7   * 108   CO2 24 25   BUN 14 18   CREA 0.71 0.67   * 100   CA 7.9* 8.0*     Recent Labs     12/06/21  0047 12/05/21  1336   AP 55 69   TP 6.0* 7.3   ALB 2.9* 3.7   GLOB 3.1 3.6     Recent Labs     12/05/21  1339   INR 1.0   PTP 10.7       Patient Active Problem List   Diagnosis Code    Colon polyps K63.5    Hypertension I10    DDD (degenerative disc disease), lumbar M51.36    Colitis K52.9    KAVON (obstructive sleep apnea) G47.33    Hypercholesterolemia E78.00    Arthritis M19.90    Leukocytosis D72.829    Abdominal pain R10.9    Hematochezia K92.1       Assessment and Plan:  Colitis:  Likely ischemic less likely infectious or inflammatory.      - Clear liquid diet  - Continue antibiotics  - Stool Tests pending  - Continue supportive care    She is a patient of [facility]. I have notified Ms. Jarrett Rea of her admission here and they will continue to follow. Thanks for allowing me to participate in the care of this patient.   Signed By: Evangelina Zepeda NP     12/6/2021  3:51 PM

## 2021-12-06 NOTE — ED NOTES
Bedside and Verbal shift change report given to Kerri Smith RN (oncoming nurse) by Radha Alfaro RN (offgoing nurse). Report included the following information SBAR, Kardex and ED Summary.

## 2021-12-07 VITALS
DIASTOLIC BLOOD PRESSURE: 72 MMHG | TEMPERATURE: 98.4 F | BODY MASS INDEX: 25.86 KG/M2 | WEIGHT: 137 LBS | HEART RATE: 69 BPM | HEIGHT: 61 IN | RESPIRATION RATE: 20 BRPM | OXYGEN SATURATION: 98 % | SYSTOLIC BLOOD PRESSURE: 136 MMHG

## 2021-12-07 LAB
ATRIAL RATE: 66 BPM
BASOPHILS # BLD: 0 K/UL (ref 0–0.1)
BASOPHILS # BLD: 0 K/UL (ref 0–0.1)
BASOPHILS NFR BLD: 0 % (ref 0–1)
BASOPHILS NFR BLD: 1 % (ref 0–1)
CALCULATED P AXIS, ECG09: 74 DEGREES
CALCULATED R AXIS, ECG10: 21 DEGREES
CALCULATED T AXIS, ECG11: 54 DEGREES
COMMENT, HOLDF: NORMAL
DIAGNOSIS, 93000: NORMAL
DIFFERENTIAL METHOD BLD: ABNORMAL
DIFFERENTIAL METHOD BLD: ABNORMAL
EOSINOPHIL # BLD: 0.2 K/UL (ref 0–0.4)
EOSINOPHIL # BLD: 0.2 K/UL (ref 0–0.4)
EOSINOPHIL NFR BLD: 2 % (ref 0–7)
EOSINOPHIL NFR BLD: 2 % (ref 0–7)
ERYTHROCYTE [DISTWIDTH] IN BLOOD BY AUTOMATED COUNT: 12.8 % (ref 11.5–14.5)
ERYTHROCYTE [DISTWIDTH] IN BLOOD BY AUTOMATED COUNT: 12.8 % (ref 11.5–14.5)
HCT VFR BLD AUTO: 27.4 % (ref 35–47)
HCT VFR BLD AUTO: 33.6 % (ref 35–47)
HGB BLD-MCNC: 10.9 G/DL (ref 11.5–16)
HGB BLD-MCNC: 8.9 G/DL (ref 11.5–16)
IMM GRANULOCYTES # BLD AUTO: 0 K/UL (ref 0–0.04)
IMM GRANULOCYTES # BLD AUTO: 0 K/UL (ref 0–0.04)
IMM GRANULOCYTES NFR BLD AUTO: 0 % (ref 0–0.5)
IMM GRANULOCYTES NFR BLD AUTO: 1 % (ref 0–0.5)
LYMPHOCYTES # BLD: 2.1 K/UL (ref 0.8–3.5)
LYMPHOCYTES # BLD: 3 K/UL (ref 0.8–3.5)
LYMPHOCYTES NFR BLD: 26 % (ref 12–49)
LYMPHOCYTES NFR BLD: 30 % (ref 12–49)
MCH RBC QN AUTO: 29.7 PG (ref 26–34)
MCH RBC QN AUTO: 30.3 PG (ref 26–34)
MCHC RBC AUTO-ENTMCNC: 32.4 G/DL (ref 30–36.5)
MCHC RBC AUTO-ENTMCNC: 32.5 G/DL (ref 30–36.5)
MCV RBC AUTO: 91.6 FL (ref 80–99)
MCV RBC AUTO: 93.2 FL (ref 80–99)
MONOCYTES # BLD: 0.5 K/UL (ref 0–1)
MONOCYTES # BLD: 0.6 K/UL (ref 0–1)
MONOCYTES NFR BLD: 5 % (ref 5–13)
MONOCYTES NFR BLD: 8 % (ref 5–13)
NEUTS SEG # BLD: 4.9 K/UL (ref 1.8–8)
NEUTS SEG # BLD: 6.1 K/UL (ref 1.8–8)
NEUTS SEG NFR BLD: 62 % (ref 32–75)
NEUTS SEG NFR BLD: 63 % (ref 32–75)
NRBC # BLD: 0 K/UL (ref 0–0.01)
NRBC # BLD: 0 K/UL (ref 0–0.01)
NRBC BLD-RTO: 0 PER 100 WBC
NRBC BLD-RTO: 0 PER 100 WBC
P-R INTERVAL, ECG05: 150 MS
PLATELET # BLD AUTO: 190 K/UL (ref 150–400)
PLATELET # BLD AUTO: 197 K/UL (ref 150–400)
PMV BLD AUTO: 11.1 FL (ref 8.9–12.9)
PMV BLD AUTO: 11.5 FL (ref 8.9–12.9)
Q-T INTERVAL, ECG07: 398 MS
QRS DURATION, ECG06: 74 MS
QTC CALCULATION (BEZET), ECG08: 417 MS
RBC # BLD AUTO: 2.94 M/UL (ref 3.8–5.2)
RBC # BLD AUTO: 3.67 M/UL (ref 3.8–5.2)
SAMPLES BEING HELD,HOLD: NORMAL
VENTRICULAR RATE, ECG03: 66 BPM
WBC # BLD AUTO: 7.9 K/UL (ref 3.6–11)
WBC # BLD AUTO: 9.8 K/UL (ref 3.6–11)

## 2021-12-07 PROCEDURE — 74011000258 HC RX REV CODE- 258: Performed by: INTERNAL MEDICINE

## 2021-12-07 PROCEDURE — 36415 COLL VENOUS BLD VENIPUNCTURE: CPT

## 2021-12-07 PROCEDURE — 74011250636 HC RX REV CODE- 250/636: Performed by: INTERNAL MEDICINE

## 2021-12-07 PROCEDURE — C9113 INJ PANTOPRAZOLE SODIUM, VIA: HCPCS | Performed by: STUDENT IN AN ORGANIZED HEALTH CARE EDUCATION/TRAINING PROGRAM

## 2021-12-07 PROCEDURE — 99238 HOSP IP/OBS DSCHRG MGMT 30/<: CPT | Performed by: FAMILY MEDICINE

## 2021-12-07 PROCEDURE — 85025 COMPLETE CBC W/AUTO DIFF WBC: CPT

## 2021-12-07 PROCEDURE — 74011250636 HC RX REV CODE- 250/636: Performed by: STUDENT IN AN ORGANIZED HEALTH CARE EDUCATION/TRAINING PROGRAM

## 2021-12-07 RX ORDER — PANTOPRAZOLE SODIUM 40 MG/1
40 TABLET, DELAYED RELEASE ORAL DAILY
Qty: 30 TABLET | Refills: 0 | Status: SHIPPED | OUTPATIENT
Start: 2021-12-07 | End: 2022-09-14

## 2021-12-07 RX ORDER — AMOXICILLIN AND CLAVULANATE POTASSIUM 875; 125 MG/1; MG/1
1 TABLET, FILM COATED ORAL 2 TIMES DAILY
Qty: 14 TABLET | Refills: 0 | Status: SHIPPED | OUTPATIENT
Start: 2021-12-08 | End: 2021-12-15

## 2021-12-07 RX ADMIN — POTASSIUM CHLORIDE, DEXTROSE MONOHYDRATE AND SODIUM CHLORIDE 75 ML/HR: 150; 5; 450 INJECTION, SOLUTION INTRAVENOUS at 06:19

## 2021-12-07 RX ADMIN — PIPERACILLIN AND TAZOBACTAM 3.38 G: 3; .375 INJECTION, POWDER, LYOPHILIZED, FOR SOLUTION INTRAVENOUS at 08:18

## 2021-12-07 RX ADMIN — PANTOPRAZOLE SODIUM 40 MG: 40 INJECTION, POWDER, FOR SOLUTION INTRAVENOUS at 08:18

## 2021-12-07 NOTE — PROGRESS NOTES
[address] [address] [facility] [address] [place] [address]   Office [phone]  Fax [phone]          Assessment and Plan     Kirsten Amador is a 78 y.o. female with PMH HTN, OA, HLD, KAVON of admitted for severe colitis. Overnight Events: NAEO. Severe Colitis: BRBPR and diarrhea, onset since 12/04. Ct abd/pelvis shows severe colitis of the left colon, involving the distal transverse colon, splenic flexure, and much of the descending colon, diverticulosis of the sigmoid colon. Leukocytosis on admission, now improving with ABXs and IVF. LA neg. Procal <0.05. CRP: 1.27. Hgb:10.1 (BL: 12) Unclear etiology at this point, but likely inflammatory vs infectious vs ischemic  Reports similar episodes on 08/2020 at [address] [place] [address]. Required 4pRBC transfusions and was admitted to the ICU. Had EGD, showed hiatal hernia and duodenitis and Colonoscopy (which showed 4 mm polyp). Capsule endoscopy showed 2 small AVM but no active source of bleeding. S/p Zosyn x 1 & 2L NS in the ED. BCx NGTD.  - Consult GI, appreciate recs. - Continue IV Zosyn. - Protonix 40 mg IV daily.  - Trend CBC q12  - Zofran prn for Nausea. - Continue MIVF  - Full liquid diet, ADAT  - F/u on Bcx  - F/u Enteric panel  - Hold anticoagulation. Anemia: POA: 10.1 (BL: 12) likely 2/2 to active GI bleeding. Type and screen already collected. - Continue monitoring vital signs.  - CBC q 12 hrs. - Transfuse of Hgb <7    HTN: POA: 135/70  - Will hold home Lisinopril 5 mg daily. - Continue monitoring and adjust as needed    Hypercholesterolemia:  Lab Results   Component Value Date/Time    Cholesterol, total 155 06/16/2021 09:10 AM    HDL Cholesterol 83 06/16/2021 09:10 AM    LDL, calculated 60.6 06/16/2021 09:10 AM    VLDL, calculated 11.4 06/16/2021 09:10 AM    Triglyceride 57 06/16/2021 09:10 AM    CHOL/HDL Ratio 1.9 06/16/2021 09:10 AM   -  Resume rosuvastatin when eating    Osteoporosis:   - Hold alendronate. PPI.     KAVON (obstructive sleep apnea)   - May use home CPAP if available. FEN/GI - NPO with clear sips. D5%-0.45% naCL w/ KCL 20 mEq/l at 75 mL/hr. Activity - Ambulate as tolerated  DVT prophylaxis - Hold anticoagulation  GI prophylaxis - Protonix  Fall prophylaxis - Not indicated at this time. Disposition - Admit to Medical. Plan to d/c to Home. Code Status - Full. Discussed with patient / caregivers. Next of Charan George Name and Contact Esdras Cap (Other Relative)   [phone]     Patient discussed with Dr. Donald Villafuerte MD  Family Medicine Resident         Subjective / Objective     Subjective: Pt was seen and examined at bedside. Reports feeling much improved today. Says she has not had diarrhea for 24 hours, but has not yet had a formed BM. Denies CP, SOB, dysuria, constipation. Objective:    Physical Exam:  General: No acute distress. Alert. Cooperative. Respiratory: CTAB. No w/r/r/c.   Cardiovascular: Normal S1,S2. No m/r/g.   GI: Nondistended.+ bowel sounds. Nontender. No rebound tenderness or guarding. Extremities: No LE edema. Skin: Warm, dry. No rashes.    Neuro: Alert and oriented    Respiratory:   O2 Device: None (Room air)   Visit Vitals  /65 (BP 1 Location: Left upper arm, BP Patient Position: At rest)   Pulse 65   Temp 98.1 °F (36.7 °C)   Resp 18   Ht 5' 1\" (1.549 m)   Wt 137 lb (62.1 kg)   SpO2 96%   Breastfeeding No   BMI 25.89 kg/m²       I/O:  Date 12/06/21 0700 - 12/07/21 0659 12/07/21 0700 - 12/08/21 0659   Shift 1850-9192 6152-8060 24 Hour Total 3757-3254 0361-1899 24 Hour Total   INTAKE   I.V.(mL/kg/hr) 475(0.6)  475(0.3)        Volume (dextrose 5% - 0.45% NaCl with KCl 20 mEq/L infusion) 375  375        Volume (piperacillin-tazobactam (ZOSYN) 3.375 g in 0.9% sodium chloride (MBP/ADV) 100 mL MBP) 100  100      Shift Total(mL/kg) 475(7.6)  475(7.6)      OUTPUT   Urine(mL/kg/hr)           Urine Occurrence(s) 3 x 2 x 5 x      Emesis/NG output           Emesis Occurrence(s)  0 x 0 x      Stool           Stool Occurrence(s) 2 x 2 x 4 x      Shift Total(mL/kg)           475      Weight (kg) 62.1 62.1 62.1 62.1 62.1 62.1       Inpatient Medications  Current Facility-Administered Medications   Medication Dose Route Frequency    pantoprazole (PROTONIX) 40 mg in 0.9% sodium chloride 10 mL injection  40 mg IntraVENous DAILY    acetaminophen (TYLENOL) tablet 650 mg  650 mg Oral Q6H PRN    Or    acetaminophen (TYLENOL) suppository 650 mg  650 mg Rectal Q6H PRN    polyethylene glycol (MIRALAX) packet 17 g  17 g Oral DAILY PRN    ondansetron (ZOFRAN ODT) tablet 4 mg  4 mg Oral Q8H PRN    Or    ondansetron (ZOFRAN) injection 4 mg  4 mg IntraVENous Q6H PRN    dextrose 5% - 0.45% NaCl with KCl 20 mEq/L infusion  75 mL/hr IntraVENous CONTINUOUS    piperacillin-tazobactam (ZOSYN) 3.375 g in 0.9% sodium chloride (MBP/ADV) 100 mL MBP  3.375 g IntraVENous Q8H         Allergies  Allergies   Allergen Reactions    Percocet [Oxycodone-Acetaminophen] Nausea and Vomiting    Sulfa (Sulfonamide Antibiotics) Nausea and Vomiting         CBC:  Recent Labs     12/07/21  0445 12/06/21  1118 12/06/21  0047   WBC 7.9 11.2* 11.0   HGB 8.9* 9.9* 10.1*   HCT 27.4* 30.5* 31.2*    179 155       Metabolic Panel:  Recent Labs     12/06/21  0047 12/05/21  1757 12/05/21  1339 12/05/21  1336    138  --  138   K 3.9 3.7  --  3.8   * 108  --  105   CO2 24 25  --  27   BUN 14 18  --  22*   CREA 0.71 0.67  --  0.85   * 100  --  107*   CA 7.9* 8.0*  --  9.1   MG 1.8  --   --   --    ALB 2.9*  --   --  3.7   ALT 16  --   --  23   INR  --   --  1.0  --        Imaging/Diagnostic Studies:  Results from Hospital Encounter encounter on 07/23/20    XR KNEE LT MAX 2 VWS    Narrative  INDICATION: Knee replacement. Primary osteoarthritis of left knee. FINDINGS: Portable AP and crosstable lateral radiographs of the left knee  demonstrate status post total knee arthroplasty. Alignment is anatomic. No  complication is evident. Postoperative changes are seen in the anterior soft  tissues. Impression  IMPRESSION: Status post left total knee arthroplasty. No evidence of  complication. No results found for this or any previous visit. Results from [place] encounter on 12/05/21    CT ABD PELV W CONT    Narrative  EXAM: CT ABD PELV W CONT    INDICATION: diffuse abdominal pain and bright red blood per rectum    COMPARISON: None    CONTRAST: 100 mL of Isovue-370. TECHNIQUE:  Following the uneventful intravenous administration of contrast, thin axial  images were obtained through the abdomen and pelvis. Coronal and sagittal  reconstructions were generated. Oral contrast was not administered. CT dose  reduction was achieved through use of a standardized protocol tailored for this  examination and automatic exposure control for dose modulation. FINDINGS:  LOWER THORAX: No significant abnormality in the incidentally imaged lower chest.  LIVER: No mass. BILIARY TREE: There are multiple layering gallstones in the gallbladder. CBD is  not dilated. SPLEEN: within normal limits. PANCREAS: No mass or ductal dilatation. ADRENALS: Unremarkable. KIDNEYS: No mass, calculus, or hydronephrosis. STOMACH: Unremarkable. SMALL BOWEL: No dilatation or wall thickening. COLON: Abnormal bowel wall thickening of the distal transverse colon, as well as  the splenic flexure and much of the descending colon. Diverticulosis of the  sigmoid colon, without evidence of acute diverticulitis of the sigmoid colon. APPENDIX: Normal  PERITONEUM: No ascites or pneumoperitoneum. RETROPERITONEUM: No lymphadenopathy or aortic aneurysm. There is diffuse  atherosclerosis of the aorta. REPRODUCTIVE ORGANS: Multiple uterine fibroids are noted. URINARY BLADDER: No mass or calculus. BONES: No destructive bone lesion. ABDOMINAL WALL: No mass or hernia. ADDITIONAL COMMENTS: N/A    Impression  1.  Severe colitis of the left colon, involving the distal transverse colon,  splenic flexure, and much of the descending colon. This is likely infectious or  inflammatory in etiology. 2. No evidence of bowel perforation or obstruction. 3. Multiple uterine fibroids. 4. Diverticulosis of the sigmoid colon. 5. Cholelithiasis. No valid procedures specified. For Billing    Chief Complaint   Patient presents with   [facility] Problems  Date Reviewed: 12/5/2021          Codes Class Noted POA    * (Principal) Colitis ICD-10-CM: K52.9  ICD-9-CM: 558.9  12/5/2021 Yes        Hypercholesterolemia ICD-10-CM: E78.00  ICD-9-CM: 272.0  Unknown Yes        Arthritis ICD-10-CM: M19.90  ICD-9-CM: 716.90  Unknown Yes        Leukocytosis ICD-10-CM: D72.829  ICD-9-CM: 288.60  12/5/2021 Yes        Abdominal pain ICD-10-CM: R10.9  ICD-9-CM: 789.00  12/5/2021 Yes        Hematochezia ICD-10-CM: K92.1  ICD-9-CM: 578.1  12/5/2021 Yes        DDD (degenerative disc disease), lumbar (Chronic) ICD-10-CM: M51.36  ICD-9-CM: 722.52  12/18/2019 Yes        Hypertension ICD-10-CM: I10  ICD-9-CM: 401.9  6/18/2019 Yes        Colon polyps ICD-10-CM: K63.5  ICD-9-CM: 211.3  3/23/2010 Yes    Overview Addendum 3/18/2021  9:28 AM by Hilton Bookman, LPN Dr. Shary Spatz 2009. Due 2014. Formatting of this note might be different from the original.  Dr. Shary Spatz 2009. Due 2014.              KAVON (obstructive sleep apnea) ICD-10-CM: G47.33  ICD-9-CM: 327.23  3/23/2010 Yes

## 2021-12-07 NOTE — DISCHARGE INSTRUCTIONS
HOME DISCHARGE INSTRUCTIONS    Capo Lemon / [id number] : 1955    Admission date: 2021 Discharge date: 2021 11:04 AM     Please bring this form with you to show your care provider at your follow-up appointment. Primary care provider:  Lilli Hargrove MD    Discharging provider:  Kasandra Gomez MD  - Family Medicine Resident  Susy Wagoner MD - Family Medicine Attending      You have been admitted to the hospital with the following diagnoses:    ACUTE DIAGNOSES:  Colitis [K52.9]    . . . . . . . . . . . . . . . . . . . . . . . . . . . . . . . . . . . . . . . . . . . . . . . . . . . . . . . . . . . . . . . . . . . . . . . .   You are well enough to be discharged from the hospital. However, because you were inpatient in a hospital, you are at greater risk of having been exposed to the coronavirus. PLEASE stay inside and self-quarantine for 14 days to prevent further spread of the coronavirus. . . . . . . . . . . . . . . . . . . . . . . . . . . . . . . . . . . . . . . . . . . . . . . . . . . . . . . . . . . . . . . . . . . . . . . . .     You were admitted to the hospital for abdominal pain and blood in your stool. Lab results and diagnostic studies showed that you had inflammation in your colon. You improved with antibiotics and an acid reducer. You will continue these medications when you go home.     FOLLOW-UP CARE RECOMMENDATIONS:    Appointments  Follow-up Information     Follow up With Specialties Details Why Contact Info    Lilli Hargrove MD Family Medicine On 2021 To follow up on your hospital admission at [place] [id number]  [address] [place] [address] [facility]  Schedule an appointment as soon as possible for a visit To follow up on your hospital admission [address] [id number]           Follow-up tests needed:   - Schedule a follow up appointment with the GI specialist to manage your symptoms    Pending test results: At the time of your discharge the following test results are still pending: Final blood cultures, stool studies. Please make sure you review these results with your outpatient follow-up provider(s). DIET/what to eat:  Regular Diet    ACTIVITY:  Activity as tolerated    Wound care: none    Equipment needed:  none    Specific symptoms to watch for: chest pain, shortness of breath, fever, chills, nausea, vomiting, diarrhea, change in mentation, falling, weakness, bleeding. What to do if new or unexpected symptoms occur? If you experience any of the above symptoms (or should other concerns or questions arise after discharge) please call your primary care physician. Return to the emergency room if you cannot get hold of your doctor. · It is very important that you keep your follow-up appointment(s). · Please bring discharge papers, medication list (and/or medication bottles) to your follow-up appointments for review by your outpatient provider(s). · Please check the list of medications and be sure it includes every medication (even non-prescription medications) that your provider wants you to take. · It is important that you take the medication exactly as they are prescribed. · Keep your medication in the bottles provided by the pharmacist and keep a list of the medication names, dosages, and times to be taken in your wallet. · Do not take other medications without consulting your doctor. · If you have any questions about your medications or other instructions, please talk to your nurse or care provider before you leave the hospital.     Information obtained by:     I understand that if any problems occur once I am at home I am to contact my physician. These instructions were explained to me and I had the opportunity to ask questions. I understand and acknowledge receipt of the instructions indicated above. [facility] or [employer] Signature                                                                  Date/Time                                                                                                                                              Patient or Representative Signature                                                          Date/Time      Patient Education        Colitis: Care Instructions  Your Care Instructions  Colitis is the medical term for swelling (inflammation) of the intestine. It can be caused by different things, such as an infection or loss of blood flow in the intestine. Other causes are problems like Crohn's disease or ulcerative colitis. Symptoms may include fever, diarrhea that may be bloody, or belly pain. Sometimes symptoms go away without treatment. But you may need treatment or more tests, such as blood tests or a stool test. Or you may need imaging tests like a CT scan or a colonoscopy. In some cases, the doctor may want to test a sample of tissue from the intestine. This test is called a biopsy. The doctor has checked you carefully, but problems can develop later. If you notice any problems or new symptoms, get medical treatment right away. Follow-up care is a key part of your treatment and safety. Be sure to make and go to all appointments, and call your doctor if you are having problems. It's also a good idea to know your test results and keep a list of the medicines you take. How can you care for yourself at home? · Rest until you feel better. · Your doctor may recommend that you eat bland foods. These include rice, dry toast or crackers, bananas, and applesauce. · To prevent dehydration, drink plenty of fluids. Choose water and other clear liquids until you feel better.  If you have kidney, heart, or liver disease and have to limit fluids, talk with your doctor before you increase the amount of fluids you drink. · Be safe with medicines. Take your medicines exactly as prescribed. Call your doctor if you think you are having a problem with your medicine. You will get more details on the specific medicines your doctor prescribes. When should you call for help? Call 911 anytime you think you may need emergency care. For example, call if:    · You passed out (lost consciousness).     · Your stools are maroon or very bloody. Call your doctor now or seek immediate medical care if:    · You have new or worse belly pain.     · You have a fever.     · You are vomiting.     · You cannot pass stools or gas.     · You have new or more blood in your stools. Watch closely for changes in your health, and be sure to contact your doctor if:    · You have new or worse symptoms.     · You are losing weight.     · You do not get better as expected. Where can you learn more? Go to http://www.gray.com/  Enter [id number] in the search box to learn more about \"Colitis: Care Instructions. \"  Current as of: February 10, 2021               Content Version: 13.0  © 2315-2220 [employer]. Care instructions adapted under license by [employer] (which disclaims liability or warranty for this information). If you have questions about a medical condition or this instruction, always ask your healthcare professional. [address] any warranty or liability for your use of this information.

## 2021-12-07 NOTE — PROGRESS NOTES
Discharge instructions provided to patient, right forearm IV discontinued, tip intact. Patient tolerated well. Patient will call once transportation here, states may be 530-600pm     1803- patient discharged home via wheelchair to main entrance.

## 2021-12-07 NOTE — PROGRESS NOTES
Fall Risk Interventions:                        Medication Interventions: Patient to call before getting OOB         Elimination Interventions: Call light in reach [postmenopausal] : postmenopausal [N] : Patient does not use contraception [Menarche Age: ____] : age at menarche was [unfilled] [Post-Menopause, No Sxs] : post-menopausal, currently without symptoms [Currently Active] : currently active [Men] : men [Vaginal] : vaginal [No] : No [Patient refuses STI testing] : Patient refuses STI testing [TextBox_19] : br1 [Mammogramdate] : 7/08/2020 [BreastSonogramDate] : 7/08/2020 [PapSmeardate] : 6/13/2020 [TextBox_25] : br1 [TextBox_31] : neg [BoneDensityDate] : 9/07/2020 [TextBox_37] : osteopenia [LMPDate] : 2007 [PGHxTotal] : 4 [[facility]] : 2 [[facility] CenterxLiving] : 2 [PGHxABSpont] : 2 [FreeTextEntry1] : 2007
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
## 2021-12-07 NOTE — PROGRESS NOTES
CARE MANAGEMENT INITIAL ASSESSEMENT      NAME:   Guillermo Paula   :     1955   MRN:     [id number]       Emergency Contact:  Extended Emergency Contact Information  Primary Emergency Contact: [address] Phone: [phone]  Relation: Other Relative  Secondary Emergency Contact: Marilyn Gomez Phone: [phone]  Mobile Phone: [phone]  Relation: Son    Advance Directive:  Full Code, does not have an advance directive. Remigio Barr Healthcare Decision Maker:    Primary Decision Maker: Odalys Ramiro - Son - [phone]     Reason for Admission:  Ms. Raudel Gong is a 78 y.o. female with history that includes arthritis, colon polyps, hypercholesterolemia, and KAVON  who was emergently admitted for:  colitis    Patient Active Problem List   Diagnosis Code    Colon polyps K63.5    Hypertension I10    DDD (degenerative disc disease), lumbar M51.36    Colitis K52.9    KAVON (obstructive sleep apnea) G47.33    Hypercholesterolemia E78.00    Arthritis M19.90    Leukocytosis D72.829    Abdominal pain R10.9    Hematochezia K92.1       Assessment: In person with patient. RUR:  6%  Risk Level:  Low  Value-based purchasing:   No  Bundle patient:  No    Residency:  Private residence  Exterior Steps:  1  Interior Steps:  None    Lives With:  Other: adult granddaughter    Prior functioning:  Independent.   Patient requires assistance with:  N/A    Prior DME required:  None    DME available:  None    Rehab history:  None    Discharge Concerns:  None      Insurer:  Payor: Sharmila Wilcox / Plan: Nancy Hollis / Product Type: Managed Care Medicare /     PCP: Jonathon Lawrence MD   Name of Practice:  [facility]   Current patient: Yes   Approximate date of last visit: 21   Access to virtual PCP visits:  Yes    Pharmacy:  [facility] [place]     COVID-19 vaccination status:  Not vaccinated    DC Transport:  Family      Transition of care plan:  Home with outpatient follow-up     Comments:   Pt admitted on 12/5/21 for colitis. CM met with Pt to complete initial assessment. Pt states that she lives at home with her adult granddaughter. Pt has no hx of HH, home O2, or equipment. Pt is independent with ADLs and ambulation. Pt denies problems with either. Discharge plan is for Pt to return home. Pt states family will transport her home.  _____________________________________  Brandy Timmons, [address] [place] [address] Drive Management  12/6/2021   10:32 PM      Care Management Interventions  PCP Verified by CM: Yes Juan A Marlow MD)  Mode of Transport at Discharge:  Other (see comment) (family)  Transition of Care Consult (CM Consult): Discharge Planning  MyChart Signup: No  Discharge Durable Medical Equipment: No  Physical Therapy Consult: No  Occupational Therapy Consult: No  Speech Therapy Consult: No  Support Systems: Child(nanette), Other Family Member(s)  Confirm Follow Up Transport: Family  Discharge Location  Discharge Placement: Home with outpatient services

## 2021-12-12 LAB
BACTERIA SPEC CULT: NORMAL
SERVICE CMNT-IMP: NORMAL

## 2021-12-14 ENCOUNTER — OFFICE VISIT (OUTPATIENT)
Dept: FAMILY MEDICINE CLINIC | Age: 66
End: 2021-12-14
Payer: MEDICARE

## 2021-12-14 VITALS
BODY MASS INDEX: 26.43 KG/M2 | SYSTOLIC BLOOD PRESSURE: 142 MMHG | DIASTOLIC BLOOD PRESSURE: 68 MMHG | RESPIRATION RATE: 18 BRPM | WEIGHT: 140 LBS | HEART RATE: 57 BPM | TEMPERATURE: 97.8 F | OXYGEN SATURATION: 98 % | HEIGHT: 61 IN

## 2021-12-14 DIAGNOSIS — H25.011 CORTICAL AGE-RELATED CATARACT OF RIGHT EYE: ICD-10-CM

## 2021-12-14 DIAGNOSIS — I10 PRIMARY HYPERTENSION: ICD-10-CM

## 2021-12-14 DIAGNOSIS — K62.5 BRBPR (BRIGHT RED BLOOD PER RECTUM): ICD-10-CM

## 2021-12-14 DIAGNOSIS — K52.9 COLITIS: Primary | ICD-10-CM

## 2021-12-14 DIAGNOSIS — D64.9 ANEMIA, UNSPECIFIED TYPE: ICD-10-CM

## 2021-12-14 DIAGNOSIS — Z09 HOSPITAL DISCHARGE FOLLOW-UP: ICD-10-CM

## 2021-12-14 DIAGNOSIS — Z01.818 PRE-OPERATIVE GENERAL PHYSICAL EXAMINATION: ICD-10-CM

## 2021-12-14 PROCEDURE — G8754 DIAS BP LESS 90: HCPCS | Performed by: FAMILY MEDICINE

## 2021-12-14 PROCEDURE — G8536 NO DOC ELDER MAL SCRN: HCPCS | Performed by: FAMILY MEDICINE

## 2021-12-14 PROCEDURE — G8753 SYS BP > OR = 140: HCPCS | Performed by: FAMILY MEDICINE

## 2021-12-14 PROCEDURE — G8419 CALC BMI OUT NRM PARAM NOF/U: HCPCS | Performed by: FAMILY MEDICINE

## 2021-12-14 PROCEDURE — G8432 DEP SCR NOT DOC, RNG: HCPCS | Performed by: FAMILY MEDICINE

## 2021-12-14 PROCEDURE — G8399 PT W/DXA RESULTS DOCUMENT: HCPCS | Performed by: FAMILY MEDICINE

## 2021-12-14 PROCEDURE — 1090F PRES/ABSN URINE INCON ASSESS: CPT | Performed by: FAMILY MEDICINE

## 2021-12-14 PROCEDURE — 99214 OFFICE O/P EST MOD 30 MIN: CPT | Performed by: FAMILY MEDICINE

## 2021-12-14 PROCEDURE — 1101F PT FALLS ASSESS-DOCD LE1/YR: CPT | Performed by: FAMILY MEDICINE

## 2021-12-14 PROCEDURE — 3017F COLORECTAL CA SCREEN DOC REV: CPT | Performed by: FAMILY MEDICINE

## 2021-12-14 PROCEDURE — G8427 DOCREV CUR MEDS BY ELIG CLIN: HCPCS | Performed by: FAMILY MEDICINE

## 2021-12-14 PROCEDURE — 1111F DSCHRG MED/CURRENT MED MERGE: CPT | Performed by: FAMILY MEDICINE

## 2021-12-14 NOTE — PROGRESS NOTES
1. Have you been to the ER, urgent care clinic since your last visit? Hospitalized since your last visit? [facility]    2. Have you seen or consulted any other health care providers outside of the [address] [place] [address] since your last visit? Including any pap smears or colon screening. no    Reviewed record in preparation for visit and have necessary documentation    Pt did not bring medication to office visit for review    Opportunity was given for questions    Goals that were addressed and/or need to be completed during or after this appointment include   Health Maintenance Due   Topic Date Due    COVID-19 Vaccine (1) Never done    Low dose CT lung screening  Never done    Shingrix Vaccine Age 50> (2 of 2) 11/26/2019    Colorectal Cancer Screening Combo  03/22/2021    Breast Cancer Screen Mammogram  05/29/2021     Body mass index is 26.45 kg/m².

## 2021-12-14 NOTE — LETTER
12/14/2021 8:50 AM    Ms. Janeen Miranda  [address] [facility]           Current Outpatient Medications   Medication Instructions    alendronate (FOSAMAX) 70 mg tablet TAKE ONE TABLET BY MOUTH EVERY 7 DAYS    amoxicillin-clavulanate (AUGMENTIN) 875-125 mg per tablet 1 Tablet, Oral, 2 TIMES DAILY    calcium carbonate (CALTREX) 600 mg, Oral, 2 TIMES DAILY    cholecalciferol (VITAMIN D3) 5,000 Units, Oral, DAILY    gabapentin (NEURONTIN) 300 mg capsule TAKE 2 CAPSULES BY MOUTH EVERY NIGHT    lisinopriL (PRINIVIL, ZESTRIL) 5 mg tablet TAKE ONE TABLET BY MOUTH EVERY DAY    ondansetron (ZOFRAN ODT) 4 mg, Oral, EVERY 8 HOURS AS NEEDED    pantoprazole (PROTONIX) 40 mg, Oral, DAILY    rosuvastatin (CRESTOR) 10 mg tablet TAKE ONE TABLET BY MOUTH NIGHTLY    ZINC ACETATE PO Oral       Sincerely,      Mahnaz Negrete MD

## 2021-12-14 NOTE — PROGRESS NOTES
[address]  [phone]        Transition of Care Visit    Patient: Hemant Wright MRN: [id number]  SSN: [id number]    YOB: 1955  Age: 78 y.o. Sex: female      Hospital: [facility]  Dates of admission: 12/5/21-12/7/21  Discharge diagnoses: Colitis, BRBPR, Anemia, HTN  State of health at discharge: Stable  Surgical or invasive procedures done: None    Amount and/or Complexity of Data Reviewed:   Clinical lab tests: Reviewed or ordered   Tests in the radiology section: reviewed or ordered  Discussion of test results with the patient: yes  Obtain previous medical records or obtain history from someone other than the patient: Yes  Obtain history from someone other than the patient: No  Review and address past medical records: Yes  Discuss the patient with another provider: no  Independant visualization of image, tracing, or specimen: no    Risk of Significant Complications, Morbidity, and/or Mortality:   Presenting problems: High   Diagnostic procedures: Moderate   Management options: Moderate     Transition of Care time spent:   Total time providing care and documentation: 40-60 minutes   Progress at discharge:   Stable on Discharge      Progress Note    Patient: Hemant Wright MRN: [id number]  SSN: [id number]    YOB: 1955  Age: 78 y.o. Sex: female        Chief Complaint   Patient presents with   [facility] Follow Up         Subjective:     Encounter Diagnoses   Name Primary?  Colitis Yes    BRBPR (bright red blood per rectum)     Anemia, unspecified type     Primary hypertension    [facility] discharge follow-up     Pre-operative general physical examination     Cortical age-related cataract of right eye      Starting last Saturday developed severe abdominal pain and BRBPR on BM suddenly and went to ER for evaluation. Found to have severe colitis with mild anemia present.   Treated with fluids and antibiotics, discharged 2 days later a feeling \"at 100%\" per patient. Did not require blood transfusion on this occasion. Patient completed Augmentin today. Recommended follow up for GI outpatient, wants to be seen at [address] [place] [address]. Current and past medical information:    Current Medications after this visit[de-identified]     Current Outpatient Medications   Medication Sig    amoxicillin-clavulanate (AUGMENTIN) 875-125 mg per tablet Take 1 Tablet by mouth two (2) times a day for 7 days.  pantoprazole (PROTONIX) 40 mg tablet Take 1 Tablet by mouth daily.  gabapentin (NEURONTIN) 300 mg capsule TAKE 2 CAPSULES BY MOUTH EVERY NIGHT    alendronate (FOSAMAX) 70 mg tablet TAKE ONE TABLET BY MOUTH EVERY 7 DAYS    lisinopriL (PRINIVIL, ZESTRIL) 5 mg tablet TAKE ONE TABLET BY MOUTH EVERY DAY    ZINC ACETATE PO Take  by mouth.  rosuvastatin (CRESTOR) 10 mg tablet TAKE ONE TABLET BY MOUTH NIGHTLY    cholecalciferol, VITAMIN D3, (VITAMIN D3) 5,000 unit tab tablet Take 1 Tab by mouth daily.  calcium carbonate (CALTREX) 600 mg calcium (1,500 mg) tablet Take 1 Tab by mouth two (2) times a day. Indications: osteoporosis    ondansetron (ZOFRAN ODT) 8 mg disintegrating tablet Take 0.5 Tabs by mouth every eight (8) hours as needed for Nausea. (Patient not taking: Reported on 6/16/2021)     No current facility-administered medications for this visit.        Health Maintenance Due   Topic Date Due    COVID-19 Vaccine (1) Never done    Low dose CT lung screening  Never done    Shingrix Vaccine Age 50> (2 of 2) 11/26/2019    Colorectal Cancer Screening Combo  03/22/2021    Breast Cancer Screen Mammogram  05/29/2021       Patient Active Problem List    Diagnosis Date Noted    Colitis 12/05/2021    Leukocytosis 12/05/2021    Abdominal pain 12/05/2021    Hematochezia 12/05/2021    Hypercholesterolemia     Arthritis     DDD (degenerative disc disease), lumbar 12/18/2019    Hypertension 06/18/2019    Colon polyps 2010    KAVON (obstructive sleep apnea) 2010       Past Medical History:   Diagnosis Date    Arthritis     Colon polyps 3/23/2010    Hypercholesterolemia     KAVON (obstructive sleep apnea) 2010       Allergies   Allergen Reactions    Percocet [Oxycodone-Acetaminophen] Nausea and Vomiting    Sulfa (Sulfonamide Antibiotics) Nausea and Vomiting       Past Surgical History:   Procedure Laterality Date    HX ORTHOPAEDIC      multiple fxs    RI ABDOMEN SURGERY PROC UNLISTED      ruptured spleen       Social History     Socioeconomic History    Marital status:    Tobacco Use    Smoking status: Former Smoker     Packs/day: 0.50     Years: 50.00     Pack years: 25.00     Quit date: 2020     Years since quittin.3    Smokeless tobacco: Never Used   Substance and Sexual Activity    Alcohol use: Yes     Comment: occasional    Drug use: No         Review of Systems   Review of Systems   Constitutional: Negative for chills and fever. Cardiovascular: Negative for chest pain and palpitations. Gastrointestinal: Negative for nausea and vomiting. Neurological: Negative for dizziness and headaches. Objective:     Vitals:    21 0805 21 0848   BP: (!) 162/74 (!) 142/68   Pulse: (!) 57    Resp: 18    Temp: 97.8 °F (36.6 °C)    TempSrc: Oral    SpO2: 98%    Weight: 140 lb (63.5 kg)    Height: 5' 1\" (1.549 m)       Body mass index is 26.45 kg/m². Physical Exam  Vitals and nursing note reviewed. Constitutional:       Appearance: Normal appearance. HENT:      Head: Normocephalic and atraumatic. Cardiovascular:      Rate and Rhythm: Normal rate and regular rhythm. Pulses: Normal pulses. Heart sounds: Normal heart sounds. No murmur heard. No friction rub. No gallop. Pulmonary:      Effort: Pulmonary effort is normal.      Breath sounds: Normal breath sounds. Abdominal:      General: Abdomen is flat.  Bowel sounds are normal.      Palpations: Abdomen is soft.   Musculoskeletal:         General: Normal range of motion. Cervical back: Normal range of motion and neck supple. Skin:     General: Skin is warm and dry. Neurological:      Mental Status: She is alert. Assessment and orders:       ICD-10-CM ICD-9-CM    1. Colitis  K52.9 558.9 REFERRAL TO GASTROENTEROLOGY   2. BRBPR (bright red blood per rectum)  K62.5 569.3    3. Anemia, unspecified type  D64.9 285.9 CBC W/O DIFF      IRON PROFILE      FERRITIN   4. Primary hypertension  I10 401.9    5. Hospital discharge follow-up  Z09 V67.59 REFERRAL TO GASTROENTEROLOGY   6. Pre-operative general physical examination  Z01.818 V72.83    7. Cortical age-related cataract of right eye  H25.011 366.15      Diagnoses and all orders for this visit:    1. Colitis: Doing well, will get labs now, follow up with Gastro  -     REFERRAL TO GASTROENTEROLOGY    2. BRBPR (bright red blood per rectum)    3. Anemia, unspecified type  -     CBC W/O DIFF; Future  -     IRON PROFILE; Future  -     FERRITIN; Future    4. Primary hypertension    5. Hospital discharge follow-up  -     REFERRAL TO GASTROENTEROLOGY    6. Pre-operative general physical examination: Medically stable, low risk for complication. 7. Cortical age-related cataract of right eye      Follow-up and Dispositions    · Return in about 3 months (around 3/14/2022). Plan of care:  Discussed diagnoses in detail with patient. Medication risks/benefits/side effects discussed with patient. All of the patient's questions were addressed. The patient understands and agrees with our plan of care. The patient knows to call back if they are unsure of or forget any changes we discussed today or if the symptoms change. The patient received an After-Visit Summary which contains VS, orders, medication list and allergy list. This can be used as a \"mini-medical record\" should they have to seek medical care while out of town.     Follow-up and Dispositions    · Return in about 3 months (around 3/14/2022).          Future Appointments   Date Time Provider Chandra Yola   3/14/2022  8:20 AM Christian Espinosa MD [facility] AMB       Signed By: Carrie Weir MD     December 14, 2021

## 2021-12-15 LAB
ERYTHROCYTE [DISTWIDTH] IN BLOOD BY AUTOMATED COUNT: 13.5 % (ref 11.5–14.5)
FERRITIN SERPL-MCNC: 73 NG/ML (ref 8–252)
HCT VFR BLD AUTO: 36.5 % (ref 35–47)
HGB BLD-MCNC: 11.1 G/DL (ref 11.5–16)
IRON SATN MFR SERPL: 23 % (ref 20–50)
IRON SERPL-MCNC: 60 UG/DL (ref 35–150)
MCH RBC QN AUTO: 29.6 PG (ref 26–34)
MCHC RBC AUTO-ENTMCNC: 30.4 G/DL (ref 30–36.5)
MCV RBC AUTO: 97.3 FL (ref 80–99)
NRBC # BLD: 0 K/UL (ref 0–0.01)
NRBC BLD-RTO: 0 PER 100 WBC
PLATELET # BLD AUTO: 281 K/UL (ref 150–400)
PMV BLD AUTO: 11.6 FL (ref 8.9–12.9)
RBC # BLD AUTO: 3.75 M/UL (ref 3.8–5.2)
TIBC SERPL-MCNC: 260 UG/DL (ref 250–450)
WBC # BLD AUTO: 5.8 K/UL (ref 3.6–11)

## 2021-12-22 DIAGNOSIS — M79.2 NEUROPATHIC PAIN: ICD-10-CM

## 2021-12-22 DIAGNOSIS — M51.36 DDD (DEGENERATIVE DISC DISEASE), LUMBAR: Chronic | ICD-10-CM

## 2021-12-22 RX ORDER — GABAPENTIN 300 MG/1
CAPSULE ORAL
Qty: 60 CAPSULE | Refills: 0 | Status: SHIPPED | OUTPATIENT
Start: 2021-12-22 | End: 2022-02-21

## 2022-01-17 ENCOUNTER — TELEPHONE (OUTPATIENT)
Dept: FAMILY MEDICINE CLINIC | Age: 67
End: 2022-01-17

## 2022-01-17 RX ORDER — PREDNISONE 20 MG/1
20 TABLET ORAL
Qty: 5 TABLET | Refills: 0 | Status: SHIPPED | OUTPATIENT
Start: 2022-01-17 | End: 2022-01-22

## 2022-01-17 NOTE — TELEPHONE ENCOUNTER
Pt was cleaning her closet and may have pulled a muscle. She is now having some neck pain that's radiating down her back. She have been using a heating pad to help with pain. Given her medical history can she take Ibuprofen? Or what do you recommend? . Pt already taking Tylenol Arthritis, and Aleve.

## 2022-01-17 NOTE — TELEPHONE ENCOUNTER
Prednisone Ordered to pharmacy.     MD MILLY Huntley & DERRICK ARREDONDO [facility]  01/17/22

## 2022-02-19 DIAGNOSIS — M79.2 NEUROPATHIC PAIN: ICD-10-CM

## 2022-02-19 DIAGNOSIS — M51.36 DDD (DEGENERATIVE DISC DISEASE), LUMBAR: Chronic | ICD-10-CM

## 2022-02-21 RX ORDER — GABAPENTIN 300 MG/1
CAPSULE ORAL
Qty: 60 CAPSULE | Refills: 0 | Status: SHIPPED | OUTPATIENT
Start: 2022-02-21 | End: 2022-03-23

## 2022-02-21 NOTE — TELEPHONE ENCOUNTER
reviewed and Appropriate. LOV 12/14/21, Upcoming Appt on 3/14/22. Taking Medications Appropriately. Appears needs to update CSC on File.     MD MILLY Raymond & DERRICK ARREDONDO [facility]  02/21/22

## 2022-03-14 ENCOUNTER — OFFICE VISIT (OUTPATIENT)
Dept: FAMILY MEDICINE CLINIC | Age: 67
End: 2022-03-14
Payer: MEDICARE

## 2022-03-14 VITALS
HEIGHT: 61 IN | SYSTOLIC BLOOD PRESSURE: 103 MMHG | TEMPERATURE: 97.6 F | BODY MASS INDEX: 27 KG/M2 | HEART RATE: 57 BPM | RESPIRATION RATE: 16 BRPM | WEIGHT: 143 LBS | DIASTOLIC BLOOD PRESSURE: 57 MMHG | OXYGEN SATURATION: 99 %

## 2022-03-14 DIAGNOSIS — G89.4 CHRONIC PAIN SYNDROME: ICD-10-CM

## 2022-03-14 DIAGNOSIS — I10 PRIMARY HYPERTENSION: ICD-10-CM

## 2022-03-14 DIAGNOSIS — E78.5 HYPERLIPIDEMIA, UNSPECIFIED HYPERLIPIDEMIA TYPE: ICD-10-CM

## 2022-03-14 DIAGNOSIS — M51.36 DDD (DEGENERATIVE DISC DISEASE), LUMBAR: Primary | ICD-10-CM

## 2022-03-14 DIAGNOSIS — M79.2 NEUROPATHIC PAIN: ICD-10-CM

## 2022-03-14 DIAGNOSIS — G47.33 OSA (OBSTRUCTIVE SLEEP APNEA): ICD-10-CM

## 2022-03-14 DIAGNOSIS — D64.9 ANEMIA, UNSPECIFIED TYPE: ICD-10-CM

## 2022-03-14 DIAGNOSIS — E78.00 HYPERCHOLESTEROLEMIA: ICD-10-CM

## 2022-03-14 DIAGNOSIS — N18.30 STAGE 3 CHRONIC KIDNEY DISEASE, UNSPECIFIED WHETHER STAGE 3A OR 3B CKD (HCC): ICD-10-CM

## 2022-03-14 PROCEDURE — 3017F COLORECTAL CA SCREEN DOC REV: CPT | Performed by: FAMILY MEDICINE

## 2022-03-14 PROCEDURE — G8427 DOCREV CUR MEDS BY ELIG CLIN: HCPCS | Performed by: FAMILY MEDICINE

## 2022-03-14 PROCEDURE — G8399 PT W/DXA RESULTS DOCUMENT: HCPCS | Performed by: FAMILY MEDICINE

## 2022-03-14 PROCEDURE — G8510 SCR DEP NEG, NO PLAN REQD: HCPCS | Performed by: FAMILY MEDICINE

## 2022-03-14 PROCEDURE — G8754 DIAS BP LESS 90: HCPCS | Performed by: FAMILY MEDICINE

## 2022-03-14 PROCEDURE — 1101F PT FALLS ASSESS-DOCD LE1/YR: CPT | Performed by: FAMILY MEDICINE

## 2022-03-14 PROCEDURE — 99214 OFFICE O/P EST MOD 30 MIN: CPT | Performed by: FAMILY MEDICINE

## 2022-03-14 PROCEDURE — G8419 CALC BMI OUT NRM PARAM NOF/U: HCPCS | Performed by: FAMILY MEDICINE

## 2022-03-14 PROCEDURE — 1090F PRES/ABSN URINE INCON ASSESS: CPT | Performed by: FAMILY MEDICINE

## 2022-03-14 PROCEDURE — G8752 SYS BP LESS 140: HCPCS | Performed by: FAMILY MEDICINE

## 2022-03-14 PROCEDURE — G8536 NO DOC ELDER MAL SCRN: HCPCS | Performed by: FAMILY MEDICINE

## 2022-03-14 NOTE — PROGRESS NOTES
[facility]    History of Present Illness: Cameron Mayorga is a 78 y.o. female with history of HTN, KAVON, DDD, HLD  CC: Follow up Chronic Conditions  History provided by patient and Records    HPI:  Hypertension Follow up:  Currently Taking:   Key CAD CHF Meds             lisinopriL (PRINIVIL, ZESTRIL) 5 mg tablet (Taking) TAKE ONE TABLET BY MOUTH EVERY DAY    rosuvastatin (CRESTOR) 10 mg tablet (Taking) TAKE ONE TABLET BY MOUTH NIGHTLY         The patient reports:  taking medications as instructed, no medication side effects noted, no TIA's, no chest pain on exertion, no dyspnea on exertion, no swelling of ankles, no orthostatic dizziness or lightheadedness, no orthopnea or paroxysmal nocturnal dyspnea. BP Readings from Last 3 Encounters:   03/14/22 (!) 103/57   12/14/21 (!) 142/68   12/07/21 136/72      Hypertriglyceridemia Follow up:   Cardiovascular risks for her are: hypertension  hyperlipidemia. Current Medications:  Key Antihyperlipidemia Meds             rosuvastatin (CRESTOR) 10 mg tablet (Taking) TAKE ONE TABLET BY MOUTH NIGHTLY          Compliance: YES   Myalgias: NO   Fatigue: NO   Other side effects: NO     Wt Readings from Last 3 Encounters:   03/14/22 143 lb (64.9 kg)   12/14/21 140 lb (63.5 kg)   12/05/21 137 lb (62.1 kg)       Lab Results   Component Value Date/Time    Cholesterol, total 155 06/16/2021 09:10 AM    HDL Cholesterol 83 06/16/2021 09:10 AM    LDL, calculated 60.6 06/16/2021 09:10 AM    VLDL, calculated 11.4 06/16/2021 09:10 AM    Triglyceride 57 06/16/2021 09:10 AM    CHOL/HDL Ratio 1.9 06/16/2021 09:10 AM      Lab Results   Component Value Date/Time    ALT (SGPT) 16 12/06/2021 12:47 AM    AST (SGOT) 13 (L) 12/06/2021 12:47 AM    Alk. phosphatase 55 12/06/2021 12:47 AM    Bilirubin, total 0.6 12/06/2021 12:47 AM      Chronic Pain: Right leg and Knee OA with back pain. Taking Gabapentin at night and doing well overall.   Needs to get CSC and Compliance drug screen completed. Osteoporosis: Taking Alendronate. Health Maintenance  Health Maintenance Due   Topic Date Due    COVID-19 Vaccine (1) Never done    Low dose CT lung screening  Never done    Shingrix Vaccine Age 50> (2 of 2) 2019    Colorectal Cancer Screening Combo  2021    Breast Cancer Screen Mammogram  2021       Past Medical, Family, and Social History:     Current Outpatient Medications on File Prior to Visit   Medication Sig Dispense Refill    gabapentin (NEURONTIN) 300 mg capsule TAKE 2 CAPSULES BY MOUTH EVERY NIGHT Max amount Daily 600 mg 60 Capsule 0    pantoprazole (PROTONIX) 40 mg tablet Take 1 Tablet by mouth daily. 30 Tablet 0    alendronate (FOSAMAX) 70 mg tablet TAKE ONE TABLET BY MOUTH EVERY 7 DAYS 12 Tablet 1    lisinopriL (PRINIVIL, ZESTRIL) 5 mg tablet TAKE ONE TABLET BY MOUTH EVERY DAY 90 Tablet 1    ZINC ACETATE PO Take  by mouth.  rosuvastatin (CRESTOR) 10 mg tablet TAKE ONE TABLET BY MOUTH NIGHTLY 90 Tablet 1    cholecalciferol, VITAMIN D3, (VITAMIN D3) 5,000 unit tab tablet Take 1 Tab by mouth daily. 90 Tab 3    calcium carbonate (CALTREX) 600 mg calcium (1,500 mg) tablet Take 1 Tab by mouth two (2) times a day. Indications: osteoporosis 180 Tab 3     No current facility-administered medications on file prior to visit.        Patient Active Problem List   Diagnosis Code    Colon polyps K63.5    Hypertension I10    DDD (degenerative disc disease), lumbar M51.36    Colitis K52.9    KAVON (obstructive sleep apnea) G47.33    Hypercholesterolemia E78.00    Arthritis M19.90    Leukocytosis D72.829    Abdominal pain R10.9    Hematochezia K92.1       Social History     Socioeconomic History    Marital status:    Tobacco Use    Smoking status: Former Smoker     Packs/day: 0.50     Years: 50.00     Pack years: 25.00     Quit date: 2020     Years since quittin.6    Smokeless tobacco: Never Used   Substance and Sexual Activity    Alcohol use: Yes     Comment: occasional    Drug use: No        Review of Systems   Review of Systems   Constitutional: Negative for chills and fever. Cardiovascular: Negative for chest pain and palpitations. Gastrointestinal: Negative for vomiting. Musculoskeletal: Positive for back pain and joint pain. Neurological: Negative for dizziness and headaches. Objective:     Visit Vitals  BP (!) 103/57   Pulse (!) 57   Temp 97.6 °F (36.4 °C)   Resp 16   Ht 5' 1\" (1.549 m)   Wt 143 lb (64.9 kg)   LMP 01/01/2007   SpO2 99%   BMI 27.02 kg/m²        Physical Exam  Vitals and nursing note reviewed. Constitutional:       Appearance: Normal appearance. HENT:      Head: Normocephalic. Cardiovascular:      Rate and Rhythm: Normal rate and regular rhythm. Pulses: Normal pulses. Heart sounds: Normal heart sounds. No murmur heard. No friction rub. No gallop. Pulmonary:      Effort: Pulmonary effort is normal.      Breath sounds: Normal breath sounds. Abdominal:      General: Abdomen is flat. Bowel sounds are normal.      Palpations: Abdomen is soft. Musculoskeletal:         General: Normal range of motion. Cervical back: Normal range of motion and neck supple. Skin:     General: Skin is warm and dry. Neurological:      Mental Status: She is alert. Pertinent Labs/Studies:      Assessment and orders:       ICD-10-CM ICD-9-CM    1. DDD (degenerative disc disease), lumbar  M51.36 722.52    2. Stage 3 chronic kidney disease, unspecified whether stage 3a or 3b CKD ([facility])  N18.30 585.3    3. Hypercholesterolemia  E78.00 272.0 LIPID PANEL   4. Primary hypertension  I10 401.9 CBC W/O DIFF      METABOLIC PANEL, COMPREHENSIVE   5. KAVON (obstructive sleep apnea)  G47.33 327.23    6. Neuropathic pain  M79.2 729.2 COMPLIANCE DRUG SCREEN/PRESCRIPTION MONITORING   7. Chronic pain syndrome  G89.4 338.4 COMPLIANCE DRUG SCREEN/PRESCRIPTION MONITORING   8.  Anemia, unspecified type  D64.9 285.9 FERRITIN      IRON PROFILE     Diagnoses and all orders for this visit:    1. DDD (degenerative disc disease), lumbar    2. Stage 3 chronic kidney disease, unspecified whether stage 3a or 3b CKD ([facility] [address]    3. Hypercholesterolemia: The patient is aware of our goal to reduce or eliminate the long term problems (such as strokes and heart attacks) related to poorly controlled Triglycerides, LDL, Cholesterol.   -     LIPID PANEL; Future    4. Primary hypertension: Our goal is to normalize the blood pressure to decrease the risks of strokes and heart attacks. The patient is in agreement with the plan. -     CBC W/O DIFF; Future  -     METABOLIC PANEL, COMPREHENSIVE; Future    5. KAVON (obstructive sleep apnea)    6. Neuropathic pain  -     COMPLIANCE DRUG SCREEN/PRESCRIPTION MONITORING; Future    7. Chronic pain syndrome  -     COMPLIANCE DRUG SCREEN/PRESCRIPTION MONITORING; Future    8. Anemia, unspecified type  -     FERRITIN; Future  -     IRON PROFILE; Future      Follow-up and Dispositions    · Return in about 3 months (around 6/14/2022) for Follow up. .           I have discussed the diagnosis with the patient and the intended plan as seen in the above orders. Social history, medical history, and labs were reviewed. The patient has received an after-visit summary and questions were answered concerning future plans. I have discussed medication side effects and warnings with the patient as well.     Everlyn Burkitt, MD PRISCILLA CHAN & DERRICK ARREDONDO [facility]  03/14/22

## 2022-03-14 NOTE — PROGRESS NOTES
1. Have you been to the ER, urgent care clinic since your last visit? Hospitalized since your last visit? No    2. Have you seen or consulted any other health care providers outside of the [address] [place] [address] since your last visit? Include any pap smears or colon screening. No  Reviewed record in preparation for visit and have necessary documentation  Pt did not bring medication to office visit for review  opportunity was given for questions      3. For patients aged 39-70: Has the patient had a colonoscopy / FIT/ Cologuard? Yes - Care Gap present. Rooming MA/LPN to request most recent results      If the patient is female:    4. For patients aged 41-77: Has the patient had a mammogram within the past 2 years? No      5. For patients aged 21-65: Has the patient had a pap smear?  NA - based on age or sex      Goals that were addressed and/or need to be completed during or after this appointment include   Health Maintenance Due   Topic Date Due    COVID-19 Vaccine (1) Never done    Low dose CT lung screening  Never done    Shingrix Vaccine Age 50> (2 of 2) 11/26/2019    Colorectal Cancer Screening Combo  03/22/2021    Breast Cancer Screen Mammogram  05/29/2021

## 2022-03-14 NOTE — LETTER
Name:Gibran Kim  XL   MR #:[id number]   Office:[address]   Page 1 of 5        CONTROLLED SUBSTANCE AGREEMENT     I may be prescribed medications that are controlled substances as part  of my treatment plan for management of my medical condition(s). The goal of my treatment plan is to maintain and/or improve my health and wellbeing. Because controlled substances have an increased risk of abuse or harm, continual re-evaluation is needed determine if the goals of my treatment plan are being met for my safety and the safety of others. I, Henry Harris  am entering into this Controlled Substance Agreement with my provider, __________________________________ at [address] [place] [address] . I understand that successful treatment requires mutual trust and honesty between me and my provider. I understand that there are state and federal laws and regulations which apply to the medications that my provider may prescribe that must be followed. I understand there are risks and benefits ts of taking the medicines that my provider may prescribe. I understand and agree that following this Agreement is necessary in continuing my provider-patient relationship and success of my treatment plan. As a part of my treatment plan, I agree to the following:    COMMUNICATION:    1. I will communicate fully with my provider about my medical condition(s), including the effect on my daily life and how well my medications are helping. I will tell my provider all of the medications that I take for any reason, including medications I receive from another health care provider, and will notify my provider about all issues, problems or concerns, including any side effects, which may be related to my medications. I understand that this information allows my provider to adjust my treatment plan to help manage my medical condition.  I understand that this information will become part of my permanent medical record. 2. I will notify my provider if I have a history of alcohol/drug misuse/addiction or if I have had treatment for alcohol/drug addiction in the past, or if I have a new problem with or concern about alcohol/drug use/addiction, because this increases the likelihood of high risk behaviors and may lead to serious medical conditions. 3. Females Only: I will notify my provider if I am or become pregnant, or if I intend to become pregnant, or if I intend to breastfeed. I understand that communication of these issues with my provider is important, due to possible effects my medication could have on an unborn fetus or breastfeeding child. Initials_____      Name:David Carrion   PXS:[id number]   MR #:[id number]   Office:[address]   Page 2 of 5       MISUSE OF MEDICATIONS / DRUGS:    1. I agree to take all controlled substances as prescribed, and will not misuse or abuse any controlled substances prescribed by my provider. For my safety, I will not increase the amount of medicine I take without first talking with and getting permission from my provider. 2. If I have a medical emergency, another health care provider may prescribe me medication. If I seek emergency treatment, I will notify my provider within seventy-two (72) hours. 3. I understand that my provider may discuss my use and/or possible misuse/abuse of controlled substances and alcohol, as appropriate, with any health care provider involved in my care, pharmacist or legal authority. ILLEGAL DRUGS:    1. I will not use illegal drugs of any kind, including but not limited to marijuana, heroin, cocaine, or any prescription drug which is not prescribed to me. DRUG DIVERSION / PRESCRIPTION FRAUD:    1. I will not share, sell, trade, give away, or otherwise misuse my prescriptions or medications.     2. I will not alter any prescriptions provided to me by my provider. SINGLE PROVIDER:    1. I agree that all controlled substances that I take will be prescribed only by my provider (or his/her covering provider) under this Agreement. This agreement does not prevent me from seeking emergency medical treatment or receiving pain management related to a surgery. PROTECTING MEDICATIONS:    1. I am responsible for keeping my prescriptions and medications in a safe and secure place including safeguarding them from loss or theft. I understand that lost, stolen or damaged/destroyed prescriptions or medications will not be replaced. Initials____          Name:David Morejon   KTB:[birth year]   MR #:[id number]   [facility]   Page 3 of 5   PRESCRIPTION RENEWALS/REFILLS:    1. I will follow my controlled substance medication schedule as prescribed by my provider. 2. I understand and agree that I will make any requests for renewals or refills of my prescriptions only at the time of an office visit or during my providers regular office hours subject to the prescription refill requirements of the individual practice. 3. I understand that my provider may not call in prescriptions for controlled substances to my pharmacy. 4. I understand that my provider may adjust or discontinue these medications as deemed appropriate for my medical treatment plan. This Agreement does not guarantee the prescription of controlled medications. 5. I agree that if my medications are adjusted or discontinued, I will properly dispose of any remaining medications. I understand that I will be required to dispose of any remaining controlled medications prior to being provided with any prescriptions for other controlled medications. 6. I understand that the renewal of my prescription depends on my medical condition, my consistent participation, and my adherence with my treatment plan and this Agreement.     7. I understand that if I do not keep an appointment with my provider, I may not receive a renewal or refill for my controlled substance medication. PRESCRIPTION MONITORING / DRUG TESTIN. I understand that my provider may require me to provide urine, saliva or blood for testing at any time. I understand that this testing will be used to monitor for safety and adherence with my treatment plan and this Agreement. 2. I understand that my provider may ask me to provide an observed urine specimen, which means that a nurse or other health care provider may watch me provide urine, and I agree to cooperate if I am asked to provide an observed specimen. 3. I understand that if I do not provide urine, saliva or blood samples within two (2) hours of my providers request, or other timeframe decided by my provider, my treatment plan could be changed, or my prescriptions and medications may be changed or ended. 4. I understand that urine, saliva and blood test results will be a part of my permanent medical record. Initials_____        Name:David Eubanks   AOZ:931   MR #:[id number]   [address] [place] [address]   Page 4 of 5    5. I understand that my provider is required to obtain a copy of my State Prescription Monitoring Program () Report at any time in order to safely prescribe medications. 6. I will bring all of my prescribed controlled substance medications in their original bottles to all of my scheduled appointments. 7. I understand that my provider may ask me to come to the practice with all of my prescribed medications for a random pill count at any time. I agree to cooperate if I am asked to come in for a random pill count. I understand that if I do not arrive in the timeframe decided by my provider, my treatment plan could be changed, or my prescriptions and medications may be changed or ended.     COOPERATION WITH INVESTIGATIONS:    1. I authorize my provider and my pharmacy to cooperate fully with any local, state, or federal law enforcement agency in the investigation of any possible misuse, sale, or other diversion of my controlled substance prescriptions or medications. RISKS:    1. I understand that my level of consciousness may be affected from the use of controlled substances, and I understand that there are risks, benefits, effects and potential alternatives (including no treatment) to the medications that my provider has prescribed. 2. I understand that I may become drowsy, tired, dizzy, constipated, and sick to my stomach, or have changes in my mood or in my sleep while taking my medications. I have talked with my provider about these possible side effects, risks, benefits, and alternative treatments, and my provider has answered all of my questions. 3. I understand that I should not suddenly stop taking my medications without first speaking with my provider. I understand that if I suddenly stop taking my medications, I may experience nausea, vomiting, sweating,anxiety, sleeplessness, itching or other uncomfortable feelings. 4. I will not take my medications with alcohol of any kind, including beer, wine or liquor. I understand that drinking alcohol with my medications increases the chances of side effects, including breathing problems or even death. 5. I understand that if I have a history of alcoholism or other drug addiction I may be at increased risk of addiction to my medications. Signs of addiction might include craving, compulsive use, and continued use despite harm. Since addiction is a disease, I understand my provider may decide to change my medications and refer me to appropriate treatment services. I understand that this information would become part of my permanent medical record. Initials_____        Name:David Pearce   PAT:[birth year]   MR #:[id number]   Office:[address]   Page 5 of 5       6.  Females only: Children born to women who regularly take controlled substances are likely to have physical problems and suffer withdrawal symptoms at birth. If I am of child-bearing age, I understand that I should use safe and effective birth control while taking any controlled substances to avoid the impact of medications on an unborn fetus or  child. I agree to notify my provider immediately if I should become pregnant so that my treatment plan can be adjusted. 7. Males only: I understand that chronic use of controlled substances has been associated with low testosterone levels in males which may affect my mood, stamina, sexual desire, and general health. I understand that my provider may order the appropriate laboratory test to determine my testosterone level,and I agree to this testing. ADHERENCE:    1. I understand that if I do not adhere to this Agreement in any way, my provider may change my prescriptions, stop prescribing controlled substances or end our provider-patient relationship. 2. If my provider decides to stop prescribing medication, or decides to end our provider-patient relationship,my provider may require that I taper my medications slowly. If necessary, my provider may also provide a prescription for other medications to treat my withdrawal symptoms. UNDERSTANDING THIS AGREEMENT:    I understand that my provider may adjust or stop my prescriptions for controlled substances based on my medical condition and my treatment plan. I understand that this Agreement does not guarantee that I will be prescribed medications or controlled substances. I understand that controlled substances may be just one part  of my treatment plan. My initial on each page and my signature below shows that I have read each page of this Agreement, I have had an opportunity to ask questions, and all of my questions have been answered to my satisfaction by my provider.     By signing below, I agree to comply with this Agreement, and I understand that if I do not follow the Agreements listed above, my provider may stop    _________________________________________  Date/Time 3/14/2022 9:02 AM                 (Patient Signature)    ________________________________________    Date/Time 3/14/2022 9:02 AM   (Parent or Guardian Signature if <18 yrs)    _________________________________________ Date/Time 3/14/2022 9:02 AM   (Provider Signature)

## 2022-03-14 NOTE — ADDENDUM NOTE
"9/26/2019       RE: Danie Lomeli  [address]  [facility] [address]     Dear Dr. Celestin,    Thank you for referring your patient, Danie Lomeli, to the EYE CLINIC at [facility]. Please see a copy of my visit note below.    HPI  Danie Lomeli is a 51 year old female who presents for consideration for YAG vitrelysis.  Referred by Dr. Celestin for floaters/vitreolysis in both eyes.  Had recent laser in situ keratomileusis (LASIK) 4 months ago.  She says that she is going back in for a small enhancement in her left eye.  Floaters have been present and bothersome for over a year.  Right eye has one \"obvious large floater\" that she can \"blink out of the way\".  Left eye has more smaller obscure floaters, patient describes as a veil.  She thinks the floaters have remained stable over the past year which is how long she has noticed them.  No flashing lights that accompany the floaters.    History reviewed. No pertinent past medical history.Past Ocular History: laser in situ keratomileusis (LASIK) 2019.  No trauma, or infections.  Family history: No glaucoma, macular degeneration, or retinal detachment.  Patient adopted and not completely sure.    Eye Medications:   Artificial tears s/p LASIK    Assessment & Plan     Danie Lomeli is a 51 year old female with the following diagnoses:   1. Vitreous degeneration of both eyes    2. H/O laser assisted in situ keratomileusis    3. Epiretinal membrane (ERM) of both eyes    4. Vitreomacular traction syndrome of left eye         Reviewed macular findings in both eyes.  Recommend annual monitoring with OCT for now  Right eye is a good candidate for laser vitreolysis.  Reviewed RBA, will schedule  Left eye is still evolving the Posterior vitreous detachment (PVD) and the symptomatic floater is too close to the retina/nerve to treat.  Monitor for now.  Discussed symptoms of retinal tear/detachment and the need to be seen urgently should they " Addended by: Sarahi Castellanos on: 3/14/2022 09:10 AM     Modules accepted: Orders occur   -----------------------------------------------------------------------------------    Patient disposition:   Return for DFE RIGHT, laser day., sooner as needed.    Tye Weinstein M.D.  PGY-4, Ophthalmology       Attending Physician Attestation:  Complete documentation of historical and exam elements from today's encounter can be found in the full encounter summary report (not reduplicated in this progress note).  I personally obtained the chief complaint(s) and history of present illness.  I confirmed and edited as necessary the review of systems, past medical/surgical history, family history, social history, and examination findings as documented by others; and I examined the patient myself.  I personally reviewed the relevant tests, images, and reports as documented above.  I formulated and edited as necessary the assessment and plan and discussed the findings and management plan with the patient and family. . Attending Physician Image/Tesing Attestation: I personally reviewed the ophthalmic test(s) associated with this encounter, agree with the interpretation(s) as documented by the resident/fellow, and have edited the corresponding report(s) as necessary.  - Tomasz Weir MD       Again, thank you for allowing me to participate in the care of your patient.      Sincerely,    Tomasz Weir MD

## 2022-03-15 DIAGNOSIS — Z79.899 CONTROLLED SUBSTANCE AGREEMENT SIGNED: Chronic | ICD-10-CM

## 2022-03-15 LAB
ALBUMIN SERPL-MCNC: 4.2 G/DL (ref 3.5–5)
ALBUMIN/GLOB SERPL: 1.3 {RATIO} (ref 1.1–2.2)
ALP SERPL-CCNC: 77 U/L (ref 45–117)
ALT SERPL-CCNC: 30 U/L (ref 12–78)
ANION GAP SERPL CALC-SCNC: 2 MMOL/L (ref 5–15)
AST SERPL-CCNC: 17 U/L (ref 15–37)
BILIRUB SERPL-MCNC: 0.4 MG/DL (ref 0.2–1)
BUN SERPL-MCNC: 22 MG/DL (ref 6–20)
BUN/CREAT SERPL: 23 (ref 12–20)
CALCIUM SERPL-MCNC: 9.4 MG/DL (ref 8.5–10.1)
CHLORIDE SERPL-SCNC: 105 MMOL/L (ref 97–108)
CHOLEST SERPL-MCNC: 136 MG/DL
CO2 SERPL-SCNC: 28 MMOL/L (ref 21–32)
CREAT SERPL-MCNC: 0.96 MG/DL (ref 0.55–1.02)
ERYTHROCYTE [DISTWIDTH] IN BLOOD BY AUTOMATED COUNT: 13.8 % (ref 11.5–14.5)
FERRITIN SERPL-MCNC: 67 NG/ML (ref 8–252)
GLOBULIN SER CALC-MCNC: 3.2 G/DL (ref 2–4)
GLUCOSE SERPL-MCNC: 86 MG/DL (ref 65–100)
HCT VFR BLD AUTO: 38.4 % (ref 35–47)
HDLC SERPL-MCNC: 76 MG/DL
HDLC SERPL: 1.8 {RATIO} (ref 0–5)
HGB BLD-MCNC: 11.9 G/DL (ref 11.5–16)
IRON SATN MFR SERPL: 23 % (ref 20–50)
IRON SERPL-MCNC: 62 UG/DL (ref 35–150)
LDLC SERPL CALC-MCNC: 49.8 MG/DL (ref 0–100)
MCH RBC QN AUTO: 29.5 PG (ref 26–34)
MCHC RBC AUTO-ENTMCNC: 31 G/DL (ref 30–36.5)
MCV RBC AUTO: 95.3 FL (ref 80–99)
NRBC # BLD: 0 K/UL (ref 0–0.01)
NRBC BLD-RTO: 0 PER 100 WBC
PLATELET # BLD AUTO: 260 K/UL (ref 150–400)
PMV BLD AUTO: 11.4 FL (ref 8.9–12.9)
POTASSIUM SERPL-SCNC: 4.6 MMOL/L (ref 3.5–5.1)
PROT SERPL-MCNC: 7.4 G/DL (ref 6.4–8.2)
RBC # BLD AUTO: 4.03 M/UL (ref 3.8–5.2)
SODIUM SERPL-SCNC: 135 MMOL/L (ref 136–145)
TIBC SERPL-MCNC: 275 UG/DL (ref 250–450)
TRIGL SERPL-MCNC: 51 MG/DL (ref ?–150)
VLDLC SERPL CALC-MCNC: 10.2 MG/DL
WBC # BLD AUTO: 6.6 K/UL (ref 3.6–11)

## 2022-03-18 PROBLEM — I10 HYPERTENSION: Status: ACTIVE | Noted: 2019-06-18

## 2022-03-18 PROBLEM — K52.9 COLITIS: Status: ACTIVE | Noted: 2021-12-05

## 2022-03-19 PROBLEM — M51.36 DDD (DEGENERATIVE DISC DISEASE), LUMBAR: Status: ACTIVE | Noted: 2019-12-18

## 2022-03-19 PROBLEM — R10.9 ABDOMINAL PAIN: Status: ACTIVE | Noted: 2021-12-05

## 2022-03-19 PROBLEM — D72.829 LEUKOCYTOSIS: Status: ACTIVE | Noted: 2021-12-05

## 2022-03-19 PROBLEM — M51.369 DDD (DEGENERATIVE DISC DISEASE), LUMBAR: Status: ACTIVE | Noted: 2019-12-18

## 2022-03-20 PROBLEM — K92.1 HEMATOCHEZIA: Status: ACTIVE | Noted: 2021-12-05

## 2022-03-23 DIAGNOSIS — M79.2 NEUROPATHIC PAIN: ICD-10-CM

## 2022-03-23 DIAGNOSIS — M51.36 DDD (DEGENERATIVE DISC DISEASE), LUMBAR: Chronic | ICD-10-CM

## 2022-03-23 LAB — DRUGS UR: NORMAL

## 2022-03-23 RX ORDER — GABAPENTIN 300 MG/1
CAPSULE ORAL
Qty: 60 CAPSULE | Refills: 1 | Status: SHIPPED | OUTPATIENT
Start: 2022-03-23 | End: 2022-04-21

## 2022-03-23 NOTE — TELEPHONE ENCOUNTER
reviewed, LOV - 3/14/2022, Last UDS - 3/14/22, CSC on file - Yes   Refill Appropriate - Yes       MD MILLY Leal [facility]  03/23/22

## 2022-03-24 PROBLEM — Z79.899 CONTROLLED SUBSTANCE AGREEMENT SIGNED: Status: ACTIVE | Noted: 2022-03-15

## 2022-04-27 ENCOUNTER — TELEPHONE (OUTPATIENT)
Dept: FAMILY MEDICINE CLINIC | Age: 67
End: 2022-04-27

## 2022-06-14 ENCOUNTER — OFFICE VISIT (OUTPATIENT)
Dept: FAMILY MEDICINE CLINIC | Age: 67
End: 2022-06-14
Payer: MEDICARE

## 2022-06-14 VITALS
HEART RATE: 64 BPM | BODY MASS INDEX: 26.62 KG/M2 | OXYGEN SATURATION: 99 % | TEMPERATURE: 97.9 F | RESPIRATION RATE: 16 BRPM | SYSTOLIC BLOOD PRESSURE: 116 MMHG | WEIGHT: 141 LBS | HEIGHT: 61 IN | DIASTOLIC BLOOD PRESSURE: 61 MMHG

## 2022-06-14 DIAGNOSIS — I10 PRIMARY HYPERTENSION: ICD-10-CM

## 2022-06-14 DIAGNOSIS — E78.00 HYPERCHOLESTEROLEMIA: Primary | ICD-10-CM

## 2022-06-14 DIAGNOSIS — G47.33 OSA (OBSTRUCTIVE SLEEP APNEA): ICD-10-CM

## 2022-06-14 DIAGNOSIS — M51.36 DDD (DEGENERATIVE DISC DISEASE), LUMBAR: ICD-10-CM

## 2022-06-14 DIAGNOSIS — G89.4 CHRONIC PAIN SYNDROME: ICD-10-CM

## 2022-06-14 DIAGNOSIS — M81.6 LOCALIZED OSTEOPOROSIS WITHOUT CURRENT PATHOLOGICAL FRACTURE: ICD-10-CM

## 2022-06-14 DIAGNOSIS — Z12.11 COLON CANCER SCREENING: ICD-10-CM

## 2022-06-14 PROCEDURE — 99214 OFFICE O/P EST MOD 30 MIN: CPT | Performed by: FAMILY MEDICINE

## 2022-06-14 PROCEDURE — G8536 NO DOC ELDER MAL SCRN: HCPCS | Performed by: FAMILY MEDICINE

## 2022-06-14 PROCEDURE — 1090F PRES/ABSN URINE INCON ASSESS: CPT | Performed by: FAMILY MEDICINE

## 2022-06-14 PROCEDURE — G8752 SYS BP LESS 140: HCPCS | Performed by: FAMILY MEDICINE

## 2022-06-14 PROCEDURE — G8427 DOCREV CUR MEDS BY ELIG CLIN: HCPCS | Performed by: FAMILY MEDICINE

## 2022-06-14 PROCEDURE — G8510 SCR DEP NEG, NO PLAN REQD: HCPCS | Performed by: FAMILY MEDICINE

## 2022-06-14 PROCEDURE — G8754 DIAS BP LESS 90: HCPCS | Performed by: FAMILY MEDICINE

## 2022-06-14 PROCEDURE — G8417 CALC BMI ABV UP PARAM F/U: HCPCS | Performed by: FAMILY MEDICINE

## 2022-06-14 PROCEDURE — 1123F ACP DISCUSS/DSCN MKR DOCD: CPT | Performed by: FAMILY MEDICINE

## 2022-06-14 PROCEDURE — 1101F PT FALLS ASSESS-DOCD LE1/YR: CPT | Performed by: FAMILY MEDICINE

## 2022-06-14 PROCEDURE — 3017F COLORECTAL CA SCREEN DOC REV: CPT | Performed by: FAMILY MEDICINE

## 2022-06-14 NOTE — PROGRESS NOTES
[facility]    History of Present Illness: Vickie Doyle is a 75 y.o. female with history of HTN, KAVON, DDD, HLD  CC: Follow up Pain  History provided by patient and Records    HPI:  Hypertension Follow up:  Currently Taking:   Key CAD CHF Meds             rosuvastatin (CRESTOR) 10 mg tablet (Taking) TAKE ONE TABLET BY MOUTH NIGHTLY    lisinopriL (PRINIVIL, ZESTRIL) 5 mg tablet (Taking) TAKE ONE TABLET BY MOUTH EVERY DAY         The patient reports:  taking medications as instructed, no medication side effects noted, no TIA's, no chest pain on exertion, no dyspnea on exertion, no swelling of ankles, no orthostatic dizziness or lightheadedness, no orthopnea or paroxysmal nocturnal dyspnea. BP Readings from Last 3 Encounters:   06/14/22 116/61   03/14/22 (!) 103/57   12/14/21 (!) 142/68       Hypertriglyceridemia Follow up:   Cardiovascular risks for her are: hypertension  hyperlipidemia. Current Medications:  Key Antihyperlipidemia Meds             rosuvastatin (CRESTOR) 10 mg tablet (Taking) TAKE ONE TABLET BY MOUTH NIGHTLY          Compliance: YES   Myalgias: NO   Fatigue: NO   Other side effects: NO     Wt Readings from Last 3 Encounters:   06/14/22 141 lb (64 kg)   03/14/22 143 lb (64.9 kg)   12/14/21 140 lb (63.5 kg)       Lab Results   Component Value Date/Time    Cholesterol, total 136 03/14/2022 09:15 AM    HDL Cholesterol 76 03/14/2022 09:15 AM    LDL, calculated 49.8 03/14/2022 09:15 AM    VLDL, calculated 10.2 03/14/2022 09:15 AM    Triglyceride 51 03/14/2022 09:15 AM    CHOL/HDL Ratio 1.8 03/14/2022 09:15 AM      Lab Results   Component Value Date/Time    ALT (SGPT) 30 03/14/2022 09:15 AM    AST (SGOT) 17 03/14/2022 09:15 AM    Alk. phosphatase 77 03/14/2022 09:15 AM    Bilirubin, total 0.4 03/14/2022 09:15 AM      Chronic Pain: Right leg and Knee OA with back pain. Taking Gabapentin at night and doing well overall. Osteoporosis: Taking Alendronate.     Health Maintenance  Health Maintenance Due   Topic Date Due    COVID-19 Vaccine (1) Never done    Low dose CT lung screening  Never done    Shingrix Vaccine Age 50> (2 of 2) 2019    Colorectal Cancer Screening Combo  2021    Breast Cancer Screen Mammogram  2021       Past Medical, Family, and Social History:     Current Outpatient Medications on File Prior to Visit   Medication Sig Dispense Refill    rosuvastatin (CRESTOR) 10 mg tablet TAKE ONE TABLET BY MOUTH NIGHTLY 90 Tablet 1    gabapentin (NEURONTIN) 300 mg capsule TAKE 2 CAPSULES BY MOUTH EVERY NIGHT 60 Capsule 1    alendronate (FOSAMAX) 70 mg tablet TAKE ONE TABLET BY MOUTH EVERY 7 DAYS 12 Tablet 1    lisinopriL (PRINIVIL, ZESTRIL) 5 mg tablet TAKE ONE TABLET BY MOUTH EVERY DAY 90 Tablet 1    ZINC ACETATE PO Take  by mouth.  cholecalciferol, VITAMIN D3, (VITAMIN D3) 5,000 unit tab tablet Take 1 Tab by mouth daily. 90 Tab 3    calcium carbonate (CALTREX) 600 mg calcium (1,500 mg) tablet Take 1 Tab by mouth two (2) times a day. Indications: osteoporosis 180 Tab 3    pantoprazole (PROTONIX) 40 mg tablet Take 1 Tablet by mouth daily. (Patient not taking: Reported on 2022) 30 Tablet 0     No current facility-administered medications on file prior to visit.        Patient Active Problem List   Diagnosis Code    Colon polyps K63.5    Hypertension I10    DDD (degenerative disc disease), lumbar M51.36    Colitis K52.9    KAVON (obstructive sleep apnea) G47.33    Hypercholesterolemia E78.00    Arthritis M19.90    Leukocytosis D72.829    Abdominal pain R10.9    Hematochezia K92.1    Controlled substance agreement signed Z79.899       Social History     Socioeconomic History    Marital status:    Tobacco Use    Smoking status: Former Smoker     Packs/day: 0.50     Years: 50.00     Pack years: 25.00     Quit date: 2020     Years since quittin.8    Smokeless tobacco: Never Used   Substance and Sexual Activity    Alcohol use: Yes     Comment: occasional    Drug use: No        Review of Systems   Review of Systems   Constitutional: Negative for chills and fever. HENT: Negative for hearing loss and tinnitus. Cardiovascular: Negative for chest pain and palpitations. Neurological: Negative for dizziness and headaches. Objective:     Visit Vitals  /61   Pulse 64   Temp 97.9 °F (36.6 °C)   Resp 16   Ht 5' 1\" (1.549 m)   Wt 141 lb (64 kg)   LMP 01/01/2007   SpO2 99%   BMI 26.64 kg/m²        Physical Exam  Vitals and nursing note reviewed. Constitutional:       Appearance: Normal appearance. HENT:      Head: Normocephalic and atraumatic. Cardiovascular:      Rate and Rhythm: Normal rate and regular rhythm. Pulses: Normal pulses. Heart sounds: Normal heart sounds. No murmur heard. No friction rub. No gallop. Pulmonary:      Effort: Pulmonary effort is normal.      Breath sounds: Normal breath sounds. Abdominal:      General: Abdomen is flat. Bowel sounds are normal.      Palpations: Abdomen is soft. Musculoskeletal:      Cervical back: Normal range of motion and neck supple. Skin:     General: Skin is warm and dry. Neurological:      Mental Status: She is alert. Pertinent Labs/Studies:      Assessment and orders:       ICD-10-CM ICD-9-CM    1. Hypercholesterolemia  E78.00 272.0    2. DDD (degenerative disc disease), lumbar  M51.36 722.52    3. Primary hypertension  I10 401.9    4. KAVON (obstructive sleep apnea)  G47.33 327.23    5. Chronic pain syndrome  G89.4 338.4    6. Localized osteoporosis without current pathological fracture  M81.6 733.09      Diagnoses and all orders for this visit:    1. Hypercholesterolemia: The patient is aware of our goal to reduce or eliminate the long term problems (such as strokes and heart attacks) related to poorly controlled Triglycerides, LDL, Cholesterol. 2. DDD (degenerative disc disease), lumbar    3.  Primary hypertension: Our goal is to normalize the blood pressure to decrease the risks of strokes and heart attacks. The patient is in agreement with the plan. 4. KAVON (obstructive sleep apnea)    5. Chronic pain syndrome: Continue Gabapentin    6. Localized osteoporosis without current pathological fracture: Stable      Follow-up and Dispositions    · Return in about 3 months (around 9/14/2022) for Follow up labs and pain. I have discussed the diagnosis with the patient and the intended plan as seen in the above orders. Social history, medical history, and labs were reviewed. The patient has received an after-visit summary and questions were answered concerning future plans. I have discussed medication side effects and warnings with the patient as well.     Aureliano Ormond, MD [facility]  06/14/22

## 2022-06-14 NOTE — PROGRESS NOTES
1. Have you been to the ER, urgent care clinic since your last visit? Hospitalized since your last visit? No    2. Have you seen or consulted any other health care providers outside of the [address] [place] [address] since your last visit? Include any pap smears or colon screening. No  Reviewed record in preparation for visit and have necessary documentation  Pt did not bring medication to office visit for review  opportunity was given for questions      3. For patients aged 39-70: Has the patient had a colonoscopy / FIT/ Cologuard? No      If the patient is female:    4. For patients aged 41-77: Has the patient had a mammogram within the past 2 years? No records requested from [facility]      5. For patients aged 21-65: Has the patient had a pap smear?  NA - based on age or sex      Goals that were addressed and/or need to be completed during or after this appointment include   Health Maintenance Due   Topic Date Due    COVID-19 Vaccine (1) Never done    Low dose CT lung screening  Never done    Shingrix Vaccine Age 50> (2 of 2) 11/26/2019    Colorectal Cancer Screening Combo  03/22/2021    Breast Cancer Screen Mammogram  05/29/2021

## 2022-06-14 NOTE — LETTER
6/14/2022 8:59 AM    Ms. Razalexx        To [address] [place] [address] .     Please fax us the most recent Mammogram results so that we may update the patient's records for continuity of care.      Our fax number: [phone]    Patient:   Cassi Head  1955    Sincerely,      Leni Diego MD

## 2022-06-22 LAB — HEMOCCULT STL QL IA: NEGATIVE

## 2022-09-14 ENCOUNTER — OFFICE VISIT (OUTPATIENT)
Dept: FAMILY MEDICINE CLINIC | Age: 67
End: 2022-09-14
Payer: MEDICARE

## 2022-09-14 VITALS
BODY MASS INDEX: 26.81 KG/M2 | WEIGHT: 142 LBS | DIASTOLIC BLOOD PRESSURE: 62 MMHG | TEMPERATURE: 98.6 F | OXYGEN SATURATION: 99 % | RESPIRATION RATE: 20 BRPM | HEART RATE: 64 BPM | SYSTOLIC BLOOD PRESSURE: 118 MMHG | HEIGHT: 61 IN

## 2022-09-14 DIAGNOSIS — M51.36 DDD (DEGENERATIVE DISC DISEASE), LUMBAR: Chronic | ICD-10-CM

## 2022-09-14 DIAGNOSIS — M79.2 NEUROPATHIC PAIN: ICD-10-CM

## 2022-09-14 DIAGNOSIS — Z23 ENCOUNTER FOR IMMUNIZATION: ICD-10-CM

## 2022-09-14 DIAGNOSIS — I10 PRIMARY HYPERTENSION: Primary | ICD-10-CM

## 2022-09-14 DIAGNOSIS — E78.00 HYPERCHOLESTEROLEMIA: ICD-10-CM

## 2022-09-14 DIAGNOSIS — M81.0 AGE-RELATED OSTEOPOROSIS WITHOUT CURRENT PATHOLOGICAL FRACTURE: ICD-10-CM

## 2022-09-14 PROCEDURE — 1090F PRES/ABSN URINE INCON ASSESS: CPT | Performed by: FAMILY MEDICINE

## 2022-09-14 PROCEDURE — G0008 ADMIN INFLUENZA VIRUS VAC: HCPCS | Performed by: FAMILY MEDICINE

## 2022-09-14 PROCEDURE — 1123F ACP DISCUSS/DSCN MKR DOCD: CPT | Performed by: FAMILY MEDICINE

## 2022-09-14 PROCEDURE — 3017F COLORECTAL CA SCREEN DOC REV: CPT | Performed by: FAMILY MEDICINE

## 2022-09-14 PROCEDURE — G8536 NO DOC ELDER MAL SCRN: HCPCS | Performed by: FAMILY MEDICINE

## 2022-09-14 PROCEDURE — 90694 VACC AIIV4 NO PRSRV 0.5ML IM: CPT | Performed by: FAMILY MEDICINE

## 2022-09-14 PROCEDURE — G8752 SYS BP LESS 140: HCPCS | Performed by: FAMILY MEDICINE

## 2022-09-14 PROCEDURE — G8432 DEP SCR NOT DOC, RNG: HCPCS | Performed by: FAMILY MEDICINE

## 2022-09-14 PROCEDURE — G8417 CALC BMI ABV UP PARAM F/U: HCPCS | Performed by: FAMILY MEDICINE

## 2022-09-14 PROCEDURE — G8754 DIAS BP LESS 90: HCPCS | Performed by: FAMILY MEDICINE

## 2022-09-14 PROCEDURE — 99214 OFFICE O/P EST MOD 30 MIN: CPT | Performed by: FAMILY MEDICINE

## 2022-09-14 PROCEDURE — G8427 DOCREV CUR MEDS BY ELIG CLIN: HCPCS | Performed by: FAMILY MEDICINE

## 2022-09-14 PROCEDURE — 1101F PT FALLS ASSESS-DOCD LE1/YR: CPT | Performed by: FAMILY MEDICINE

## 2022-09-14 RX ORDER — OMEPRAZOLE 20 MG/1
20 CAPSULE, DELAYED RELEASE ORAL 2 TIMES DAILY
Qty: 60 CAPSULE | Refills: 1 | Status: SHIPPED | OUTPATIENT
Start: 2022-09-14 | End: 2022-10-12

## 2022-09-14 RX ORDER — ALENDRONATE SODIUM 70 MG/1
TABLET ORAL
Qty: 13 TABLET | Refills: 1 | Status: SHIPPED | OUTPATIENT
Start: 2022-09-14

## 2022-09-14 RX ORDER — GABAPENTIN 300 MG/1
CAPSULE ORAL
Qty: 60 CAPSULE | Refills: 2 | Status: SHIPPED | OUTPATIENT
Start: 2022-09-14

## 2022-09-14 RX ORDER — DICLOFENAC SODIUM 50 MG/1
50 TABLET, DELAYED RELEASE ORAL 2 TIMES DAILY
Qty: 60 TABLET | Refills: 1 | Status: SHIPPED | OUTPATIENT
Start: 2022-09-14 | End: 2022-10-12

## 2022-09-14 RX ORDER — LISINOPRIL 5 MG/1
5 TABLET ORAL DAILY
Qty: 90 TABLET | Refills: 1 | Status: SHIPPED | OUTPATIENT
Start: 2022-09-14

## 2022-09-14 RX ORDER — PREDNISONE 20 MG/1
20 TABLET ORAL
Qty: 5 TABLET | Refills: 0 | Status: SHIPPED | OUTPATIENT
Start: 2022-09-14

## 2022-09-14 NOTE — PROGRESS NOTES
[facility]    History of Present Illness: Guillermo Paula is a 75 y.o. female with history of HTN, KAVON, DDD, HLD  CC: Follow up Pain  History provided by patient and Records    HPI:     Chronic Pain: Right leg and Knee OA with back pain. Taking Gabapentin at night and doing well overall. Noting several months now though of pain in the Tailbone specifically. Worse with laying down at night. Improved with heat. Patient states that the Tylenol Arthritis was not helpful. States had old Tramadols and that did help the pain mildly. Has not tried Aleve. Osteoporosis: Taking Alendronate with Vitamin D and Calcium Supplement    Hypertension Follow up:  Currently Taking:   Key CAD CHF Meds               lisinopriL (PRINIVIL, ZESTRIL) 5 mg tablet (Taking) Take 1 Tablet by mouth daily. rosuvastatin (CRESTOR) 10 mg tablet (Taking) TAKE ONE TABLET BY MOUTH NIGHTLY           The patient reports:  taking medications as instructed, no medication side effects noted, no TIA's, no chest pain on exertion, no dyspnea on exertion, no swelling of ankles. BP Readings from Last 3 Encounters:   09/14/22 118/62   06/14/22 116/61   03/14/22 (!) 103/57      Hypertriglyceridemia Follow up:   Cardiovascular risks for her are: hyperlipidemia.    Current Medications:  Key Antihyperlipidemia Meds               rosuvastatin (CRESTOR) 10 mg tablet (Taking) TAKE ONE TABLET BY MOUTH NIGHTLY            Compliance: YES   Myalgias: NO   Fatigue: NO   Other side effects: NO     Wt Readings from Last 3 Encounters:   09/14/22 142 lb (64.4 kg)   06/14/22 141 lb (64 kg)   03/14/22 143 lb (64.9 kg)       Lab Results   Component Value Date/Time    Cholesterol, total 136 03/14/2022 09:15 AM    HDL Cholesterol 76 03/14/2022 09:15 AM    LDL, calculated 49.8 03/14/2022 09:15 AM    VLDL, calculated 10.2 03/14/2022 09:15 AM    Triglyceride 51 03/14/2022 09:15 AM    CHOL/HDL Ratio 1.8 03/14/2022 09:15 AM      Lab Results Component Value Date/Time    ALT (SGPT) 30 03/14/2022 09:15 AM    AST (SGOT) 17 03/14/2022 09:15 AM    Alk. phosphatase 77 03/14/2022 09:15 AM    Bilirubin, total 0.4 03/14/2022 09:15 AM           Health Maintenance  Health Maintenance Due   Topic Date Due    COVID-19 Vaccine (1) Never done    Low dose CT lung screening  Never done    Shingrix Vaccine Age 50> (2 of 2) 11/26/2019    Breast Cancer Screen Mammogram  05/29/2021    Medicare Yearly Exam  09/17/2022       Past Medical, Family, and Social History:     Current Outpatient Medications on File Prior to Visit   Medication Sig Dispense Refill    rosuvastatin (CRESTOR) 10 mg tablet TAKE ONE TABLET BY MOUTH NIGHTLY 90 Tablet 1    ZINC ACETATE PO Take  by mouth. cholecalciferol (VITAMIN D3) (5000 Units/125 mcg) tab tablet Take 1 Tab by mouth daily. 90 Tab 3    calcium carbonate (CALTREX) 600 mg calcium (1,500 mg) tablet Take 1 Tab by mouth two (2) times a day. Indications: osteoporosis 180 Tab 3    [DISCONTINUED] gabapentin (NEURONTIN) 300 mg capsule TAKE 2 CAPSULES BY MOUTH EVERY NIGHT 60 Capsule 2    [DISCONTINUED] alendronate (FOSAMAX) 70 mg tablet TAKE ONE TABLET BY MOUTH EVERY 7 DAYS 12 Tablet 1    [DISCONTINUED] lisinopriL (PRINIVIL, ZESTRIL) 5 mg tablet TAKE ONE TABLET BY MOUTH EVERY DAY 90 Tablet 1    [DISCONTINUED] pantoprazole (PROTONIX) 40 mg tablet Take 1 Tablet by mouth daily. (Patient not taking: No sig reported) 30 Tablet 0     No current facility-administered medications on file prior to visit.        Patient Active Problem List   Diagnosis Code    Colon polyps K63.5    Hypertension I10    DDD (degenerative disc disease), lumbar M51.36    Colitis K52.9    KAVON (obstructive sleep apnea) G47.33    Hypercholesterolemia E78.00    Arthritis M19.90    Leukocytosis D72.829    Abdominal pain R10.9    Hematochezia K92.1    Controlled substance agreement signed Z79.899       Social History     Socioeconomic History    Marital status:    Tobacco Use Smoking status: Former     Packs/day: 0.50     Years: 50.00     Pack years: 25.00     Types: Cigarettes     Quit date: 2020     Years since quittin.1    Smokeless tobacco: Never   Substance and Sexual Activity    Alcohol use: Yes     Comment: occasional    Drug use: No        Review of Systems   Review of Systems   Constitutional:  Negative for chills and fever. Gastrointestinal:  Negative for abdominal pain, nausea and vomiting. Musculoskeletal:  Positive for back pain and joint pain. Objective:   Visit Vitals  /62   Pulse 64   Temp 98.6 °F (37 °C)   Resp 20   Ht 5' 1\" (1.549 m)   Wt 142 lb (64.4 kg)   LMP 2007   SpO2 99%   BMI 26.83 kg/m²        Physical Exam  Vitals and nursing note reviewed. Constitutional:       Appearance: Normal appearance. HENT:      Head: Normocephalic and atraumatic. Cardiovascular:      Rate and Rhythm: Normal rate and regular rhythm. Pulses: Normal pulses. Heart sounds: Normal heart sounds. No murmur heard. No friction rub. No gallop. Pulmonary:      Effort: Pulmonary effort is normal.      Breath sounds: Normal breath sounds. Abdominal:      General: Abdomen is flat. Bowel sounds are normal.      Palpations: Abdomen is soft. Musculoskeletal:      Cervical back: Normal range of motion and neck supple. Comments: Mild tenderness    Skin:     General: Skin is warm and dry. Neurological:      Mental Status: She is alert. Pertinent Labs/Studies:      Assessment and orders:       ICD-10-CM ICD-9-CM    1. Encounter for immunization  Z23 V03.89 INFLUENZA, FLUAD, (AGE 72 Y+), IM, PF, 0.5 ML      ADMIN INFLUENZA VIRUS VAC      2. Age-related osteoporosis without current pathological fracture  M81.0 733.01 alendronate (FOSAMAX) 70 mg tablet      3.  DDD (degenerative disc disease), lumbar  M51.36 722.52 gabapentin (NEURONTIN) 300 mg capsule      predniSONE (DELTASONE) 20 mg tablet      diclofenac EC (VOLTAREN) 50 mg EC tablet omeprazole (PRILOSEC) 20 mg capsule      4. Neuropathic pain  M79.2 729.2 gabapentin (NEURONTIN) 300 mg capsule      5. Primary hypertension  I10 401.9 lisinopriL (PRINIVIL, ZESTRIL) 5 mg tablet      6. Hypercholesterolemia  E78.00 272.0         Diagnoses and all orders for this visit:    1. Encounter for immunization  -     INFLUENZA, FLUAD, (AGE 72 Y+), IM, PF, 0.5 ML  -     ADMIN INFLUENZA VIRUS VAC    2. Age-related osteoporosis without current pathological fracture  -     alendronate (FOSAMAX) 70 mg tablet; TAKE ONE TABLET BY MOUTH EVERY 7 DAYS    3. DDD (degenerative disc disease), lumbar: Trial of Prednisone, Voltaren and Prilosec with NSAIDs  -     gabapentin (NEURONTIN) 300 mg capsule; TAKE 2 CAPSULES BY MOUTH EVERY NIGHT  -     predniSONE (DELTASONE) 20 mg tablet; Take 1 Tablet by mouth daily (with breakfast). -     diclofenac EC (VOLTAREN) 50 mg EC tablet; Take 1 Tablet by mouth two (2) times a day. -     omeprazole (PRILOSEC) 20 mg capsule; Take 1 Capsule by mouth two (2) times a day. 4. Neuropathic pain  -     gabapentin (NEURONTIN) 300 mg capsule; TAKE 2 CAPSULES BY MOUTH EVERY NIGHT    5. Primary hypertension: Our goal is to normalize the blood pressure to decrease the risks of strokes and heart attacks. The patient is in agreement with the plan. -     lisinopriL (PRINIVIL, ZESTRIL) 5 mg tablet; Take 1 Tablet by mouth daily. 6. Hypercholesterolemia    Follow-up and Dispositions    Return in about 3 months (around 12/14/2022). I have discussed the diagnosis with the patient and the intended plan as seen in the above orders. Social history, medical history, and labs were reviewed. The patient has received an after-visit summary and questions were answered concerning future plans. I have discussed medication side effects and warnings with the patient as well.     MD MILLY Hart & DERRICK ARREDONDO [facility]  09/14/22

## 2022-09-14 NOTE — PROGRESS NOTES
1. Have you been to the ER, urgent care clinic since your last visit? Hospitalized since your last visit? No    2. Have you seen or consulted any other health care providers outside of the [address] [place] [address] since your last visit? Include any pap smears or colon screening. No  Reviewed record in preparation for visit and have necessary documentation  Pt did not bring medication to office visit for review  opportunity was given for questions      3. For patients aged 39-70: Has the patient had a colonoscopy / FIT/ Cologuard? Yes - no Care Gap present      If the patient is female:    4. For patients aged 41-77: Has the patient had a mammogram within the past 2 years? No      5. For patients aged 21-65: Has the patient had a pap smear?  NA - based on age or sex      Goals that were addressed and/or need to be completed during or after this appointment include   Health Maintenance Due   Topic Date Due    COVID-19 Vaccine (1) Never done    Low dose CT lung screening  Never done    Shingrix Vaccine Age 50> (2 of 2) 11/26/2019    Breast Cancer Screen Mammogram  05/29/2021    Flu Vaccine (1) 09/01/2022    Medicare Yearly Exam  09/17/2022

## 2022-09-15 LAB
ALBUMIN SERPL-MCNC: 4 G/DL (ref 3.5–5)
ALBUMIN/GLOB SERPL: 1.3 {RATIO} (ref 1.1–2.2)
ALP SERPL-CCNC: 74 U/L (ref 45–117)
ALT SERPL-CCNC: 26 U/L (ref 12–78)
ANION GAP SERPL CALC-SCNC: 5 MMOL/L (ref 5–15)
AST SERPL-CCNC: 19 U/L (ref 15–37)
BILIRUB SERPL-MCNC: 0.6 MG/DL (ref 0.2–1)
BUN SERPL-MCNC: 21 MG/DL (ref 6–20)
BUN/CREAT SERPL: 22 (ref 12–20)
CALCIUM SERPL-MCNC: 9.2 MG/DL (ref 8.5–10.1)
CHLORIDE SERPL-SCNC: 104 MMOL/L (ref 97–108)
CHOLEST SERPL-MCNC: 132 MG/DL
CO2 SERPL-SCNC: 27 MMOL/L (ref 21–32)
CREAT SERPL-MCNC: 0.95 MG/DL (ref 0.55–1.02)
ERYTHROCYTE [DISTWIDTH] IN BLOOD BY AUTOMATED COUNT: 13.3 % (ref 11.5–14.5)
GLOBULIN SER CALC-MCNC: 3 G/DL (ref 2–4)
GLUCOSE SERPL-MCNC: 84 MG/DL (ref 65–100)
HCT VFR BLD AUTO: 39.7 % (ref 35–47)
HDLC SERPL-MCNC: 74 MG/DL
HDLC SERPL: 1.8 {RATIO} (ref 0–5)
HGB BLD-MCNC: 12.4 G/DL (ref 11.5–16)
LDLC SERPL CALC-MCNC: 43.8 MG/DL (ref 0–100)
MCH RBC QN AUTO: 30.1 PG (ref 26–34)
MCHC RBC AUTO-ENTMCNC: 31.2 G/DL (ref 30–36.5)
MCV RBC AUTO: 96.4 FL (ref 80–99)
NRBC # BLD: 0 K/UL (ref 0–0.01)
NRBC BLD-RTO: 0 PER 100 WBC
PLATELET # BLD AUTO: 251 K/UL (ref 150–400)
PMV BLD AUTO: 11.2 FL (ref 8.9–12.9)
POTASSIUM SERPL-SCNC: 4.6 MMOL/L (ref 3.5–5.1)
PROT SERPL-MCNC: 7 G/DL (ref 6.4–8.2)
RBC # BLD AUTO: 4.12 M/UL (ref 3.8–5.2)
SODIUM SERPL-SCNC: 136 MMOL/L (ref 136–145)
TRIGL SERPL-MCNC: 71 MG/DL (ref ?–150)
VLDLC SERPL CALC-MCNC: 14.2 MG/DL
WBC # BLD AUTO: 6.7 K/UL (ref 3.6–11)

## 2022-10-24 ENCOUNTER — TELEPHONE (OUTPATIENT)
Dept: FAMILY MEDICINE CLINIC | Age: 67
End: 2022-10-24

## 2022-10-24 NOTE — TELEPHONE ENCOUNTER
Pt would like to know if Dr Bharat Gan will call in Tramadol into [facility] . She made an appointment for 10/28 for xray. [facility]

## 2022-10-28 ENCOUNTER — OFFICE VISIT (OUTPATIENT)
Dept: FAMILY MEDICINE CLINIC | Age: 67
End: 2022-10-28
Payer: MEDICARE

## 2022-10-28 VITALS
RESPIRATION RATE: 18 BRPM | TEMPERATURE: 98.9 F | HEIGHT: 61 IN | SYSTOLIC BLOOD PRESSURE: 106 MMHG | DIASTOLIC BLOOD PRESSURE: 59 MMHG | BODY MASS INDEX: 26.73 KG/M2 | OXYGEN SATURATION: 100 % | WEIGHT: 141.6 LBS | HEART RATE: 54 BPM

## 2022-10-28 DIAGNOSIS — N18.30 STAGE 3 CHRONIC KIDNEY DISEASE, UNSPECIFIED WHETHER STAGE 3A OR 3B CKD (HCC): ICD-10-CM

## 2022-10-28 DIAGNOSIS — G89.29 CHRONIC MIDLINE LOW BACK PAIN WITH BILATERAL SCIATICA: Primary | ICD-10-CM

## 2022-10-28 DIAGNOSIS — M54.42 CHRONIC MIDLINE LOW BACK PAIN WITH BILATERAL SCIATICA: Primary | ICD-10-CM

## 2022-10-28 DIAGNOSIS — M54.41 CHRONIC MIDLINE LOW BACK PAIN WITH BILATERAL SCIATICA: Primary | ICD-10-CM

## 2022-10-28 PROCEDURE — G8417 CALC BMI ABV UP PARAM F/U: HCPCS | Performed by: FAMILY MEDICINE

## 2022-10-28 PROCEDURE — 3074F SYST BP LT 130 MM HG: CPT | Performed by: FAMILY MEDICINE

## 2022-10-28 PROCEDURE — 3078F DIAST BP <80 MM HG: CPT | Performed by: FAMILY MEDICINE

## 2022-10-28 PROCEDURE — 3017F COLORECTAL CA SCREEN DOC REV: CPT | Performed by: FAMILY MEDICINE

## 2022-10-28 PROCEDURE — 1101F PT FALLS ASSESS-DOCD LE1/YR: CPT | Performed by: FAMILY MEDICINE

## 2022-10-28 PROCEDURE — G8752 SYS BP LESS 140: HCPCS | Performed by: FAMILY MEDICINE

## 2022-10-28 PROCEDURE — 99214 OFFICE O/P EST MOD 30 MIN: CPT | Performed by: FAMILY MEDICINE

## 2022-10-28 PROCEDURE — 1123F ACP DISCUSS/DSCN MKR DOCD: CPT | Performed by: FAMILY MEDICINE

## 2022-10-28 PROCEDURE — G8510 SCR DEP NEG, NO PLAN REQD: HCPCS | Performed by: FAMILY MEDICINE

## 2022-10-28 PROCEDURE — G8536 NO DOC ELDER MAL SCRN: HCPCS | Performed by: FAMILY MEDICINE

## 2022-10-28 PROCEDURE — 1090F PRES/ABSN URINE INCON ASSESS: CPT | Performed by: FAMILY MEDICINE

## 2022-10-28 PROCEDURE — G8427 DOCREV CUR MEDS BY ELIG CLIN: HCPCS | Performed by: FAMILY MEDICINE

## 2022-10-28 PROCEDURE — G8399 PT W/DXA RESULTS DOCUMENT: HCPCS | Performed by: FAMILY MEDICINE

## 2022-10-28 PROCEDURE — G8754 DIAS BP LESS 90: HCPCS | Performed by: FAMILY MEDICINE

## 2022-10-28 RX ORDER — NALOXONE HYDROCHLORIDE 4 MG/.1ML
SPRAY NASAL
Qty: 4 EACH | Refills: 1 | Status: SHIPPED | OUTPATIENT
Start: 2022-10-28

## 2022-10-28 RX ORDER — TRAMADOL HYDROCHLORIDE 50 MG/1
50 TABLET ORAL
Qty: 28 TABLET | Refills: 0 | Status: SHIPPED | OUTPATIENT
Start: 2022-10-28 | End: 2022-11-11

## 2022-10-28 RX ORDER — PREDNISONE 10 MG/1
10 TABLET ORAL
Qty: 7 TABLET | Refills: 0 | Status: SHIPPED | OUTPATIENT
Start: 2022-10-28

## 2022-10-28 NOTE — PROGRESS NOTES
1. Have you been to the ER, urgent care clinic since your last visit? Hospitalized since your last visit? No    2. Have you seen or consulted any other health care providers outside of the [address] [place] [address] since your last visit? Including any pap smears or colon screening.  No      Health Maintenance Due   Topic Date Due    COVID-19 Vaccine (1) Never done    Low dose CT lung screening  Never done    Shingrix Vaccine Age 50> (2 of 2) 11/26/2019    Breast Cancer Screen Mammogram  05/29/2021    Medicare Yearly Exam  09/17/2022

## 2022-10-28 NOTE — PROGRESS NOTES
[facility]    History of Present Illness: Luke Pelayo is a 75 y.o. female with history of HTN, KAVON, DDD, HLD  CC: Low back pain  History provided by patient and Records    HPI:  Back Pain:  Patient reports severe lumbar/sacral pain that has been ongoing for the last 3 years. Patient does have a history of chronic back pain. Reports pain is related to remote injury and DDD.  - Quality: stabbing, burning, and Electric pain of 10/10 intensity at baseline.   - Frequency: every day  - Duration: intermittent  - Radiation: Radiates down the legs bilaterally  - Numbness/Decreased sensation: Yes  - Pain Exacerbations: sharp pain of 10/10 intensity lasting for minute(s). - Exacerbating factors: twisting, exercise, and lying down  - Alleviating Factors: sitting  - Current Medications tried: Gabapentin, Diclofenac, Topicals, Prednisone, and muscle relaxers. Recent Prednisone was not effective. - Previous Back Pain: previous osteoarthritis of lumbar spine  - Previous Orthopedic Surgeon: None  - Previous Physical Therapy: Failed   - Previous imaging: XR imaging in 2017  - Patient denies fevers, chills, sweats, weight loss, bowel/bladder incontinence, saddle anesthesia. Patient reports she had used some old Tramadol which did take the edge off the pain. Health Maintenance  Health Maintenance Due   Topic Date Due    COVID-19 Vaccine (1) Never done    Low dose CT lung screening  Never done    Shingrix Vaccine Age 50> (2 of 2) 11/26/2019    Breast Cancer Screen Mammogram  05/29/2021    Medicare Yearly Exam  09/17/2022       Past Medical, Family, and Social History:     Current Outpatient Medications on File Prior to Visit   Medication Sig Dispense Refill    lisinopriL (PRINIVIL, ZESTRIL) 5 mg tablet Take 1 Tablet by mouth daily.  90 Tablet 1    alendronate (FOSAMAX) 70 mg tablet TAKE ONE TABLET BY MOUTH EVERY 7 DAYS 13 Tablet 1    gabapentin (NEURONTIN) 300 mg capsule TAKE 2 CAPSULES BY MOUTH EVERY NIGHT 60 Capsule 2    rosuvastatin (CRESTOR) 10 mg tablet TAKE ONE TABLET BY MOUTH NIGHTLY 90 Tablet 1    ZINC ACETATE PO Take  by mouth. cholecalciferol (VITAMIN D3) (5000 Units/125 mcg) tab tablet Take 1 Tab by mouth daily. 90 Tab 3    calcium carbonate (CALTREX) 600 mg calcium (1,500 mg) tablet Take 1 Tab by mouth two (2) times a day. Indications: osteoporosis 180 Tab 3    omeprazole (PRILOSEC) 20 mg capsule TAKE ONE CAPSULE BY MOUTH TWICE DAILY (Patient not taking: Reported on 10/28/2022) 180 Capsule 1    diclofenac EC (VOLTAREN) 50 mg EC tablet TAKE ONE TABLET BY MOUTH TWICE DAILY (Patient not taking: Reported on 10/28/2022) 180 Tablet 1    predniSONE (DELTASONE) 20 mg tablet Take 1 Tablet by mouth daily (with breakfast). (Patient not taking: Reported on 10/28/2022) 5 Tablet 0     No current facility-administered medications on file prior to visit. Patient Active Problem List   Diagnosis Code    Colon polyps K63.5    Hypertension I10    DDD (degenerative disc disease), lumbar M51.36    Colitis K52.9    KAVON (obstructive sleep apnea) G47.33    Hypercholesterolemia E78.00    Arthritis M19.90    Leukocytosis D72.829    Abdominal pain R10.9    Hematochezia K92.1    Controlled substance agreement signed Z79.899    Chronic renal disease, stage III N18.30       Social History     Socioeconomic History    Marital status:    Tobacco Use    Smoking status: Former     Packs/day: 0.50     Years: 50.00     Pack years: 25.00     Types: Cigarettes     Quit date: 2020     Years since quittin.2    Smokeless tobacco: Never   Substance and Sexual Activity    Alcohol use: Yes     Comment: occasional    Drug use: No        Review of Systems   Review of Systems   Constitutional:  Negative for chills and fever. Gastrointestinal:  Negative for abdominal pain, nausea and vomiting. Musculoskeletal:  Positive for back pain.      Objective:   Visit Vitals  BP (!) 106/59 (BP 1 Location: Left arm, BP Patient Position: Sitting, BP Cuff Size: Adult)   Pulse (!) 54   Temp 98.9 °F (37.2 °C) (Oral)   Resp 18   Ht 5' 1\" (1.549 m)   Wt 141 lb 9.6 oz (64.2 kg)   LMP 01/01/2007   SpO2 100%   BMI 26.76 kg/m²        Physical Exam  Vitals and nursing note reviewed. Constitutional:       Appearance: Normal appearance. HENT:      Head: Normocephalic and atraumatic. Cardiovascular:      Rate and Rhythm: Normal rate and regular rhythm. Pulses: Normal pulses. Heart sounds: Normal heart sounds. No murmur heard. No friction rub. No gallop. Pulmonary:      Effort: Pulmonary effort is normal.      Breath sounds: Normal breath sounds. Abdominal:      General: Abdomen is flat. Bowel sounds are normal.      Palpations: Abdomen is soft. Musculoskeletal:         General: Normal range of motion. Cervical back: Normal range of motion and neck supple. Skin:     General: Skin is warm and dry. Neurological:      Mental Status: She is alert. Pertinent Labs/Studies:      Assessment and orders:       ICD-10-CM ICD-9-CM    1. Chronic midline low back pain with bilateral sciatica  M54.41 724.2 XR SPINE LUMB 2 OR 3 V    M54.42 724. 3 predniSONE (DELTASONE) 10 mg tablet    G89.29 338.29 traMADoL (ULTRAM) 50 mg tablet      naloxone (NARCAN) 4 mg/actuation nasal spray      2. Stage 3 chronic kidney disease, unspecified whether stage 3a or 3b CKD ([facility]  N18.30 585. 3         Diagnoses and all orders for this visit:    1. Chronic midline low back pain with bilateral sciatica: Short course Prednisone, Tramadol, XR imaging now and consider Ortho follow up in [address] [place] [address]. -     XR SPINE LUMB 2 OR 3 V; Future  -     predniSONE (DELTASONE) 10 mg tablet; Take 10 mg by mouth daily (with breakfast). -     traMADoL (ULTRAM) 50 mg tablet; Take 1 Tablet by mouth every twelve (12) hours as needed for Pain for up to 14 days.  Max Daily Amount: 100 mg.  -     naloxone (NARCAN) 4 mg/actuation nasal spray; Use 1 spray intranasally, then discard. Repeat with new spray every 2 min as needed for opioid overdose symptoms, alternating nostrils. 2. Stage 3 chronic kidney disease, unspecified whether stage 3a or 3b CKD ([facility]          I have discussed the diagnosis with the patient and the intended plan as seen in the above orders. Social history, medical history, and labs were reviewed. The patient has received an after-visit summary and questions were answered concerning future plans. I have discussed medication side effects and warnings with the patient as well.     MD MILLY Salazar & DERRICK ARREDONDO [facility]  10/28/22

## 2023-01-06 ENCOUNTER — OFFICE VISIT (OUTPATIENT)
Dept: FAMILY MEDICINE CLINIC | Age: 68
End: 2023-01-06
Payer: MEDICARE

## 2023-01-06 VITALS
WEIGHT: 141.8 LBS | HEIGHT: 61 IN | BODY MASS INDEX: 26.77 KG/M2 | RESPIRATION RATE: 18 BRPM | HEART RATE: 64 BPM | TEMPERATURE: 98.3 F | OXYGEN SATURATION: 99 % | DIASTOLIC BLOOD PRESSURE: 62 MMHG | SYSTOLIC BLOOD PRESSURE: 137 MMHG

## 2023-01-06 DIAGNOSIS — N18.30 STAGE 3 CHRONIC KIDNEY DISEASE, UNSPECIFIED WHETHER STAGE 3A OR 3B CKD (HCC): ICD-10-CM

## 2023-01-06 DIAGNOSIS — G47.33 OSA (OBSTRUCTIVE SLEEP APNEA): ICD-10-CM

## 2023-01-06 DIAGNOSIS — I10 PRIMARY HYPERTENSION: ICD-10-CM

## 2023-01-06 DIAGNOSIS — M51.36 DDD (DEGENERATIVE DISC DISEASE), LUMBAR: Chronic | ICD-10-CM

## 2023-01-06 DIAGNOSIS — G89.4 CHRONIC PAIN SYNDROME: ICD-10-CM

## 2023-01-06 DIAGNOSIS — Z79.899 CONTROLLED SUBSTANCE AGREEMENT SIGNED: ICD-10-CM

## 2023-01-06 DIAGNOSIS — Z00.00 MEDICARE ANNUAL WELLNESS VISIT, SUBSEQUENT: Primary | ICD-10-CM

## 2023-01-06 DIAGNOSIS — M79.2 NEUROPATHIC PAIN: ICD-10-CM

## 2023-01-06 DIAGNOSIS — E78.00 HYPERCHOLESTEROLEMIA: ICD-10-CM

## 2023-01-06 PROCEDURE — 99214 OFFICE O/P EST MOD 30 MIN: CPT | Performed by: FAMILY MEDICINE

## 2023-01-06 PROCEDURE — 1123F ACP DISCUSS/DSCN MKR DOCD: CPT | Performed by: FAMILY MEDICINE

## 2023-01-06 PROCEDURE — G8399 PT W/DXA RESULTS DOCUMENT: HCPCS | Performed by: FAMILY MEDICINE

## 2023-01-06 PROCEDURE — G8432 DEP SCR NOT DOC, RNG: HCPCS | Performed by: FAMILY MEDICINE

## 2023-01-06 PROCEDURE — 1090F PRES/ABSN URINE INCON ASSESS: CPT | Performed by: FAMILY MEDICINE

## 2023-01-06 PROCEDURE — 3075F SYST BP GE 130 - 139MM HG: CPT | Performed by: FAMILY MEDICINE

## 2023-01-06 PROCEDURE — G8427 DOCREV CUR MEDS BY ELIG CLIN: HCPCS | Performed by: FAMILY MEDICINE

## 2023-01-06 PROCEDURE — G8536 NO DOC ELDER MAL SCRN: HCPCS | Performed by: FAMILY MEDICINE

## 2023-01-06 PROCEDURE — G0439 PPPS, SUBSEQ VISIT: HCPCS | Performed by: FAMILY MEDICINE

## 2023-01-06 PROCEDURE — G8417 CALC BMI ABV UP PARAM F/U: HCPCS | Performed by: FAMILY MEDICINE

## 2023-01-06 PROCEDURE — 3078F DIAST BP <80 MM HG: CPT | Performed by: FAMILY MEDICINE

## 2023-01-06 PROCEDURE — 1101F PT FALLS ASSESS-DOCD LE1/YR: CPT | Performed by: FAMILY MEDICINE

## 2023-01-06 PROCEDURE — 3017F COLORECTAL CA SCREEN DOC REV: CPT | Performed by: FAMILY MEDICINE

## 2023-01-06 RX ORDER — ASCORBIC ACID 500 MG
TABLET ORAL
COMMUNITY

## 2023-01-06 NOTE — PROGRESS NOTES
This is the Subsequent Medicare Annual Wellness Exam, performed 12 months or more after the Initial AWV or the last Subsequent AWV    I have reviewed the patient's medical history in detail and updated the computerized patient record. History     Past Medical History:   Diagnosis Date    Arthritis     Colon polyps 3/23/2010    Hypercholesterolemia     KAVON (obstructive sleep apnea) 2010      Past Surgical History:   Procedure Laterality Date    HX ORTHOPAEDIC      multiple fxs    DE ABDOMEN SURGERY PROC UNLISTED      ruptured spleen     Allergies   Allergen Reactions    Percocet [Oxycodone-Acetaminophen] Nausea and Vomiting    Sulfa (Sulfonamide Antibiotics) Nausea and Vomiting     Family History   Problem Relation Age of Onset    Diabetes Mother     Hypertension Mother     Heart Disease Mother     Thyroid Disease Mother     Diabetes Sister     Heart Disease Sister     Hypertension Sister     Thyroid Disease Sister     Cancer Brother         brain     Social History     Tobacco Use    Smoking status: Former     Packs/day: 0.50     Years: 50.00     Pack years: 25.00     Types: Cigarettes     Quit date: 2020     Years since quittin.4    Smokeless tobacco: Never   Substance Use Topics    Alcohol use: Yes     Comment: occasional     Depression Risk Factor Screening:     3 most recent PHQ Screens 10/28/2022   Little interest or pleasure in doing things Not at all   Feeling down, depressed, irritable, or hopeless Not at all   Total Score PHQ 2 0     Alcohol Risk Factor Screening:    Do you average more than 1 drink per night or more than 7 drinks a week: No    In the past three months have you have had more than 4 drinks containing alcohol on one occasion: No  Functional Ability and Level of Safety:   Hearing Loss  Hearing is good. Activities of Daily Living  The home contains: handrails and grab bars  Patient does total self care  No flowsheet data found.     Ambulation: with no difficulty    Fall Risk  Fall Risk Assessment, last 12 mths 10/28/2022   Able to walk? -   Fall in past 12 months? 0   Do you feel unsteady? 0   Are you worried about falling 0       Abuse Screen  Abuse Screening Questionnaire 12/14/2021   Do you ever feel afraid of your partner? N   Are you in a relationship with someone who physically or mentally threatens you? N   Is it safe for you to go home? Y     Cognitive Screening   Evaluation of Cognitive Function:  Has your family/caregiver stated any concerns about your memory: no  Normal  No flowsheet data found. Patient Care Team   Patient Care Team:  Saima Mcallister MD as PCP - General (Family Medicine)  Saima Mcallister MD as PCP - [facility] Provider  Assessment/Plan   Education and counseling provided:  Are appropriate based on today's review and evaluation    Diagnoses and all orders for this visit:    1. Medicare annual wellness visit, subsequent    2. Primary hypertension  -     CBC W/O DIFF; Future  -     METABOLIC PANEL, COMPREHENSIVE; Future    3. Stage 3 chronic kidney disease, unspecified whether stage 3a or 3b CKD ([facility]    4. KAVON (obstructive sleep apnea)    5. DDD (degenerative disc disease), lumbar    6. Hypercholesterolemia  -     LIPID PANEL;  Future      Health Maintenance Topics with due status: Overdue       Topic Date Due    COVID-19 Vaccine Never done    Low dose CT lung screening Never done    Shingles Vaccine 11/26/2019    Breast Cancer Screen Mammogram 05/29/2021     Health Maintenance Topics with due status: Not Due       Topic Last Completion Date    DTaP/Tdap/Td series 12/10/2014    Colorectal Cancer Screening Combo 06/15/2022    Lipid Screen 09/14/2022    Depression Screen 10/28/2022    Medicare Yearly Exam 01/06/2023     Health Maintenance Topics with due status: Completed       Topic Last Completion Date    Hepatitis C Screening 10/25/2010    Bone Densitometry (Dexa) Screening 05/29/2019    Pneumococcal 65+ years 06/11/2019    Flu Vaccine 09/14/2022

## 2023-01-06 NOTE — PROGRESS NOTES
[facility]    History of Present Illness: Yoandy Moon is a 75 y.o. female with history of HTN, KAVON, DDD, HLD  CC: Follow up  History provided by patient and Records    HPI:  Hypertension Follow up:  Currently Taking:   Key CAD CHF Meds               lisinopriL (PRINIVIL, ZESTRIL) 5 mg tablet (Taking) Take 1 Tablet by mouth daily. rosuvastatin (CRESTOR) 10 mg tablet (Taking) TAKE ONE TABLET BY MOUTH NIGHTLY           The patient reports:  taking medications as instructed, no medication side effects noted, no TIA's, no chest pain on exertion, no dyspnea on exertion, no swelling of ankles. BP Readings from Last 3 Encounters:   01/06/23 137/62   10/28/22 (!) 106/59   09/14/22 118/62      Hypertriglyceridemia Follow up:   Cardiovascular risks for her are: hypertension  hyperlipidemia. Current Medications:  Key Antihyperlipidemia Meds               rosuvastatin (CRESTOR) 10 mg tablet (Taking) TAKE ONE TABLET BY MOUTH NIGHTLY            Compliance: YES   Myalgias: NO   Fatigue: NO   Other side effects: NO     Wt Readings from Last 3 Encounters:   01/06/23 141 lb 12.8 oz (64.3 kg)   10/28/22 141 lb 9.6 oz (64.2 kg)   09/14/22 142 lb (64.4 kg)       Lab Results   Component Value Date/Time    Cholesterol, total 132 09/14/2022 10:05 AM    HDL Cholesterol 74 09/14/2022 10:05 AM    LDL, calculated 43.8 09/14/2022 10:05 AM    VLDL, calculated 14.2 09/14/2022 10:05 AM    Triglyceride 71 09/14/2022 10:05 AM    CHOL/HDL Ratio 1.8 09/14/2022 10:05 AM      Lab Results   Component Value Date/Time    ALT (SGPT) 26 09/14/2022 10:05 AM    AST (SGOT) 19 09/14/2022 10:05 AM    Alk. phosphatase 74 09/14/2022 10:05 AM    Bilirubin, total 0.6 09/14/2022 10:05 AM      Chronic Kidney Disease:  Patient reports overall doing well, patient denies any current complaints at this time.   - Etiology: HTN   - Symptoms: None  - CKD Stage: III  - Nephrologist: None  - Current Treatments: Control HTN    KAVON: Does not use C-Pap, did not tolerate well. Sleeping only 4-5 hours nightly    DDD: patient is doing well overall, noting occasionally having pain that radiates from the foot up to the knee or hip. Happens with walking long distances. Chronic pain issues, uses Gabapentin BID andTramadol PRN, very infrequent dosing. Stable and helping with function. Health Maintenance  Health Maintenance Due   Topic Date Due    COVID-19 Vaccine (1) Never done    Low dose CT lung screening  Never done    Shingles Vaccine (2 of 2) 11/26/2019    Breast Cancer Screen Mammogram  05/29/2021       Past Medical, Family, and Social History:     Current Outpatient Medications on File Prior to Visit   Medication Sig Dispense Refill    ascorbic acid, vitamin C, (Vitamin C) 500 mg tablet Take  by mouth.      gabapentin (NEURONTIN) 300 mg capsule TAKE 2 CAPSULES BY MOUTH EVERY NIGHT 60 Capsule 1    naloxone (NARCAN) 4 mg/actuation nasal spray Use 1 spray intranasally, then discard. Repeat with new spray every 2 min as needed for opioid overdose symptoms, alternating nostrils. 4 Each 1    lisinopriL (PRINIVIL, ZESTRIL) 5 mg tablet Take 1 Tablet by mouth daily. 90 Tablet 1    alendronate (FOSAMAX) 70 mg tablet TAKE ONE TABLET BY MOUTH EVERY 7 DAYS 13 Tablet 1    rosuvastatin (CRESTOR) 10 mg tablet TAKE ONE TABLET BY MOUTH NIGHTLY 90 Tablet 1    ZINC ACETATE PO Take  by mouth. cholecalciferol (VITAMIN D3) (5000 Units/125 mcg) tab tablet Take 1 Tab by mouth daily. 90 Tab 3    calcium carbonate (CALTREX) 600 mg calcium (1,500 mg) tablet Take 1 Tab by mouth two (2) times a day. Indications: osteoporosis 180 Tab 3    [DISCONTINUED] predniSONE (DELTASONE) 10 mg tablet Take 10 mg by mouth daily (with breakfast).  (Patient not taking: Reported on 1/6/2023) 7 Tablet 0    [DISCONTINUED] omeprazole (PRILOSEC) 20 mg capsule TAKE ONE CAPSULE BY MOUTH TWICE DAILY (Patient not taking: No sig reported) 180 Capsule 1    [DISCONTINUED] diclofenac EC (VOLTAREN) 50 mg EC tablet TAKE ONE TABLET BY MOUTH TWICE DAILY (Patient not taking: No sig reported) 180 Tablet 1    [DISCONTINUED] predniSONE (DELTASONE) 20 mg tablet Take 1 Tablet by mouth daily (with breakfast). (Patient not taking: No sig reported) 5 Tablet 0     No current facility-administered medications on file prior to visit. Patient Active Problem List   Diagnosis Code    Colon polyps K63.5    Hypertension I10    DDD (degenerative disc disease), lumbar M51.36    Colitis K52.9    KAVON (obstructive sleep apnea) G47.33    Hypercholesterolemia E78.00    Arthritis M19.90    Leukocytosis D72.829    Abdominal pain R10.9    Hematochezia K92.1    Controlled substance agreement signed Z79.899    Chronic renal disease, stage III N18.30       Social History     Socioeconomic History    Marital status:    Tobacco Use    Smoking status: Former     Packs/day: 0.50     Years: 50.00     Pack years: 25.00     Types: Cigarettes     Quit date: 2020     Years since quittin.4    Smokeless tobacco: Never   Substance and Sexual Activity    Alcohol use: Yes     Comment: occasional    Drug use: No        Review of Systems   Review of Systems   Constitutional:  Negative for chills and fever. Cardiovascular:  Negative for chest pain and palpitations. Gastrointestinal:  Negative for abdominal pain, nausea and vomiting. Neurological:  Negative for dizziness and headaches. Objective:   Visit Vitals  /62 (BP 1 Location: Right upper arm, BP Patient Position: Sitting, BP Cuff Size: Large adult)   Pulse 64   Temp 98.3 °F (36.8 °C) (Oral)   Resp 18   Ht 5' 1\" (1.549 m)   Wt 141 lb 12.8 oz (64.3 kg)   LMP 2007   SpO2 99%   BMI 26.79 kg/m²        Physical Exam  Vitals and nursing note reviewed. Constitutional:       Appearance: Normal appearance. HENT:      Head: Normocephalic and atraumatic. Cardiovascular:      Rate and Rhythm: Normal rate and regular rhythm. Pulses: Normal pulses.       Heart sounds: Normal heart sounds. No murmur heard. No friction rub. No gallop. Pulmonary:      Effort: Pulmonary effort is normal.      Breath sounds: Normal breath sounds. Abdominal:      General: Abdomen is flat. Bowel sounds are normal.      Palpations: Abdomen is soft. Musculoskeletal:         General: Normal range of motion. Cervical back: Normal range of motion and neck supple. Skin:     General: Skin is warm and dry. Neurological:      Mental Status: She is alert. Pertinent Labs/Studies:      Assessment and orders:       ICD-10-CM ICD-9-CM    1. Medicare annual wellness visit, subsequent  Z00.00 V70.0       2. Primary hypertension  I10 401.9 CBC W/O DIFF      METABOLIC PANEL, COMPREHENSIVE      3. Stage 3 chronic kidney disease, unspecified whether stage 3a or 3b CKD (HCC)  N18.30 585.3       4. KAVON (obstructive sleep apnea)  G47.33 327.23       5. DDD (degenerative disc disease), lumbar  M51.36 722.52       6. Hypercholesterolemia  E78.00 272.0 LIPID PANEL      7. Controlled substance agreement signed  Z79.899 V58.69 COMPLIANCE DRUG SCREEN/PRESCRIPTION MONITORING      8. Chronic pain syndrome  G89.4 338.4 COMPLIANCE DRUG SCREEN/PRESCRIPTION MONITORING      9. Neuropathic pain  M79.2 729.2         Diagnoses and all orders for this visit:    1. Primary hypertension: Our goal is to normalize the blood pressure to decrease the risks of strokes and heart attacks. The patient is in agreement with the plan. 2. Stage 3 chronic kidney disease, unspecified whether stage 3a or 3b CKD ([address]): Doing Well    3. KAVON (obstructive sleep apnea):     4. DDD (degenerative disc disease), lumbar: Stable    5. Hypercholesterolemia: The patient is aware of our goal to reduce or eliminate the long term problems (such as strokes and heart attacks) related to poorly controlled Triglycerides, LDL, Cholesterol. Follow-up and Dispositions    Return in about 3 months (around 4/6/2023).            I have discussed the diagnosis with the patient and the intended plan as seen in the above orders. Social history, medical history, and labs were reviewed. The patient has received an after-visit summary and questions were answered concerning future plans. I have discussed medication side effects and warnings with the patient as well.     MD MILLY Pham & DERRICK ARREDONDO [facility]  01/06/23

## 2023-01-07 LAB
ALBUMIN SERPL-MCNC: 3.8 G/DL (ref 3.5–5)
ALBUMIN/GLOB SERPL: 1.2 {RATIO} (ref 1.1–2.2)
ALP SERPL-CCNC: 70 U/L (ref 45–117)
ALT SERPL-CCNC: 27 U/L (ref 12–78)
ANION GAP SERPL CALC-SCNC: 4 MMOL/L (ref 5–15)
AST SERPL-CCNC: 25 U/L (ref 15–37)
BILIRUB SERPL-MCNC: 0.7 MG/DL (ref 0.2–1)
BUN SERPL-MCNC: 17 MG/DL (ref 6–20)
BUN/CREAT SERPL: 19 (ref 12–20)
CALCIUM SERPL-MCNC: 9.2 MG/DL (ref 8.5–10.1)
CHLORIDE SERPL-SCNC: 105 MMOL/L (ref 97–108)
CHOLEST SERPL-MCNC: 147 MG/DL
CO2 SERPL-SCNC: 28 MMOL/L (ref 21–32)
CREAT SERPL-MCNC: 0.89 MG/DL (ref 0.55–1.02)
ERYTHROCYTE [DISTWIDTH] IN BLOOD BY AUTOMATED COUNT: 13 % (ref 11.5–14.5)
GLOBULIN SER CALC-MCNC: 3.3 G/DL (ref 2–4)
GLUCOSE SERPL-MCNC: 80 MG/DL (ref 65–100)
HCT VFR BLD AUTO: 39.8 % (ref 35–47)
HDLC SERPL-MCNC: 78 MG/DL
HDLC SERPL: 1.9 {RATIO} (ref 0–5)
HGB BLD-MCNC: 12.5 G/DL (ref 11.5–16)
LDLC SERPL CALC-MCNC: 54.6 MG/DL (ref 0–100)
MCH RBC QN AUTO: 29.5 PG (ref 26–34)
MCHC RBC AUTO-ENTMCNC: 31.4 G/DL (ref 30–36.5)
MCV RBC AUTO: 93.9 FL (ref 80–99)
NRBC # BLD: 0 K/UL (ref 0–0.01)
NRBC BLD-RTO: 0 PER 100 WBC
PLATELET # BLD AUTO: 269 K/UL (ref 150–400)
PMV BLD AUTO: 11.3 FL (ref 8.9–12.9)
POTASSIUM SERPL-SCNC: 4.6 MMOL/L (ref 3.5–5.1)
PROT SERPL-MCNC: 7.1 G/DL (ref 6.4–8.2)
RBC # BLD AUTO: 4.24 M/UL (ref 3.8–5.2)
SODIUM SERPL-SCNC: 137 MMOL/L (ref 136–145)
TRIGL SERPL-MCNC: 72 MG/DL (ref ?–150)
VLDLC SERPL CALC-MCNC: 14.4 MG/DL
WBC # BLD AUTO: 6.5 K/UL (ref 3.6–11)

## 2023-04-06 ENCOUNTER — OFFICE VISIT (OUTPATIENT)
Dept: FAMILY MEDICINE CLINIC | Age: 68
End: 2023-04-06
Payer: MEDICARE

## 2023-04-06 PROBLEM — N18.30 CHRONIC RENAL DISEASE, STAGE III (HCC): Status: RESOLVED | Noted: 2022-10-28 | Resolved: 2023-04-06

## 2023-04-06 PROCEDURE — G8417 CALC BMI ABV UP PARAM F/U: HCPCS | Performed by: FAMILY MEDICINE

## 2023-04-06 PROCEDURE — 3017F COLORECTAL CA SCREEN DOC REV: CPT | Performed by: FAMILY MEDICINE

## 2023-04-06 PROCEDURE — 1090F PRES/ABSN URINE INCON ASSESS: CPT | Performed by: FAMILY MEDICINE

## 2023-04-06 PROCEDURE — G8536 NO DOC ELDER MAL SCRN: HCPCS | Performed by: FAMILY MEDICINE

## 2023-04-06 PROCEDURE — 3078F DIAST BP <80 MM HG: CPT | Performed by: FAMILY MEDICINE

## 2023-04-06 PROCEDURE — 1123F ACP DISCUSS/DSCN MKR DOCD: CPT | Performed by: FAMILY MEDICINE

## 2023-04-06 PROCEDURE — 3074F SYST BP LT 130 MM HG: CPT | Performed by: FAMILY MEDICINE

## 2023-04-06 PROCEDURE — 1101F PT FALLS ASSESS-DOCD LE1/YR: CPT | Performed by: FAMILY MEDICINE

## 2023-04-06 PROCEDURE — 99214 OFFICE O/P EST MOD 30 MIN: CPT | Performed by: FAMILY MEDICINE

## 2023-04-06 PROCEDURE — G8427 DOCREV CUR MEDS BY ELIG CLIN: HCPCS | Performed by: FAMILY MEDICINE

## 2023-04-06 PROCEDURE — G8510 SCR DEP NEG, NO PLAN REQD: HCPCS | Performed by: FAMILY MEDICINE

## 2023-04-06 PROCEDURE — G8399 PT W/DXA RESULTS DOCUMENT: HCPCS | Performed by: FAMILY MEDICINE

## 2023-04-06 NOTE — PROGRESS NOTES
1. Have you been to the ER, urgent care clinic since your last visit? Hospitalized since your last visit? No    2. Have you seen or consulted any other health care providers outside of the [address] [place] [address] since your last visit? Including any pap smears or colon screening.  No      Health Maintenance Due   Topic Date Due    COVID-19 Vaccine (1) Never done    Low dose CT lung screening  Never done    Shingles Vaccine (2 of 2) 11/26/2019    Breast Cancer Screen Mammogram  05/29/2021

## 2023-04-06 NOTE — PROGRESS NOTES
[facility]    History of Present Illness: Josue Miller is a 75 y.o. female with history of HTN, KAVON, DDD, HLD  CC: Follow up  History provided by patient and Records    HPI:  Hypertension Follow up:  Currently Taking:   Key CAD CHF Meds               omega 3-dha-epa-fish oil (Fish OiL) 100-160-1,000 mg cap (Taking) Take  by mouth. rosuvastatin (CRESTOR) 10 mg tablet (Taking) TAKE ONE TABLET BY MOUTH NIGHTLY    lisinopriL (PRINIVIL, ZESTRIL) 5 mg tablet (Taking) Take 1 Tablet by mouth daily. The patient reports:  taking medications as instructed, no medication side effects noted, no TIA's, no chest pain on exertion, no dyspnea on exertion, no swelling of ankles, no orthostatic dizziness or lightheadedness, no orthopnea or paroxysmal nocturnal dyspnea. BP Readings from Last 3 Encounters:   04/06/23 (!) 119/53   01/06/23 137/62   10/28/22 (!) 106/59      Hypertriglyceridemia Follow up:   Cardiovascular risks for her are: hypertension  hyperlipidemia. Current Medications:  Key Antihyperlipidemia Meds               omega 3-dha-epa-fish oil (Fish OiL) 100-160-1,000 mg cap (Taking) Take  by mouth. rosuvastatin (CRESTOR) 10 mg tablet (Taking) TAKE ONE TABLET BY MOUTH NIGHTLY            Compliance: YES   Myalgias: NO   Fatigue: NO   Other side effects: NO     Wt Readings from Last 3 Encounters:   04/06/23 145 lb 9.6 oz (66 kg)   01/06/23 141 lb 12.8 oz (64.3 kg)   10/28/22 141 lb 9.6 oz (64.2 kg)       Lab Results   Component Value Date/Time    Cholesterol, total 147 01/06/2023 09:55 AM    HDL Cholesterol 78 01/06/2023 09:55 AM    LDL, calculated 54.6 01/06/2023 09:55 AM    VLDL, calculated 14.4 01/06/2023 09:55 AM    Triglyceride 72 01/06/2023 09:55 AM    CHOL/HDL Ratio 1.9 01/06/2023 09:55 AM      Lab Results   Component Value Date/Time    ALT (SGPT) 27 01/06/2023 09:55 AM    AST (SGOT) 25 01/06/2023 09:55 AM    Alk.  phosphatase 70 01/06/2023 09:55 AM    Bilirubin, total 0.7 01/06/2023 09:55 AM      KAVON: Does not use C-Pap, did not tolerate well. Sleeping only 4-5 hours nightly. Osteoporosis: Taking Alendronate    Chronic Pain: Secondary to Lumbar DDD, patient is currently using Gabapentin 600 mg Nightly. Patient has taken Tramadol as well PRN, very infrequent dosing. Patient has tried Tylenol, NSAIDS, and PT in the past without significant improvement. Noting that she has had increased episodes with some radiating numbness in the legs bilaterally. Health Maintenance  Health Maintenance Due   Topic Date Due    COVID-19 Vaccine (1) Never done    Low dose CT lung screening  Never done    Shingles Vaccine (2 of 2) 11/26/2019    Breast Cancer Screen Mammogram  05/29/2021       Past Medical, Family, and Social History:     Current Outpatient Medications on File Prior to Visit   Medication Sig Dispense Refill    omega 3-dha-epa-fish oil (Fish OiL) 100-160-1,000 mg cap Take  by mouth. rosuvastatin (CRESTOR) 10 mg tablet TAKE ONE TABLET BY MOUTH NIGHTLY 90 Tablet 1    ascorbic acid, vitamin C, (VITAMIN C) 500 mg tablet Take  by mouth.      naloxone (NARCAN) 4 mg/actuation nasal spray Use 1 spray intranasally, then discard. Repeat with new spray every 2 min as needed for opioid overdose symptoms, alternating nostrils. 4 Each 1    lisinopriL (PRINIVIL, ZESTRIL) 5 mg tablet Take 1 Tablet by mouth daily. 90 Tablet 1    alendronate (FOSAMAX) 70 mg tablet TAKE ONE TABLET BY MOUTH EVERY 7 DAYS 13 Tablet 1    ZINC ACETATE PO Take  by mouth. cholecalciferol (VITAMIN D3) (5000 Units/125 mcg) tab tablet Take 1 Tablet by mouth daily. 90 Tab 3    calcium carbonate (CALTREX) 600 mg calcium (1,500 mg) tablet Take 1 Tab by mouth two (2) times a day. Indications: osteoporosis 180 Tab 3    [DISCONTINUED] gabapentin (NEURONTIN) 300 mg capsule TAKE 2 CAPSULES BY MOUTH EVERY NIGHT 60 Capsule 1     No current facility-administered medications on file prior to visit.        Patient Active Problem List   Diagnosis Code    Colon polyps K63.5    Hypertension I10    DDD (degenerative disc disease), lumbar M51.36    Colitis K52.9    KAVON (obstructive sleep apnea) G47.33    Hypercholesterolemia E78.00    Arthritis M19.90    Leukocytosis D72.829    Abdominal pain R10.9    Hematochezia K92.1    Controlled substance agreement signed Z79.647       Social History     Socioeconomic History    Marital status:    Tobacco Use    Smoking status: Former     Packs/day: 0.50     Years: 50.00     Pack years: 25.00     Types: Cigarettes     Quit date: 2020     Years since quittin.6    Smokeless tobacco: Never   Substance and Sexual Activity    Alcohol use: Yes     Comment: occasional    Drug use: No        Review of Systems   Review of Systems   Constitutional:  Negative for chills and fever. Cardiovascular:  Negative for chest pain and palpitations. Gastrointestinal:  Negative for abdominal pain, nausea and vomiting. Musculoskeletal:  Negative for myalgias and neck pain. Neurological:  Negative for dizziness, tingling and headaches. Objective:   Visit Vitals  BP (!) 119/53 (BP 1 Location: Right arm, BP Patient Position: Sitting, BP Cuff Size: Adult)   Pulse (!) 58   Temp 99.1 °F (37.3 °C) (Oral)   Resp 18   Ht 5' 1\" (1.549 m)   Wt 145 lb 9.6 oz (66 kg)   LMP 2007   SpO2 98%   BMI 27.51 kg/m²        Physical Exam  Vitals and nursing note reviewed. Constitutional:       Appearance: Normal appearance. HENT:      Head: Normocephalic and atraumatic. Cardiovascular:      Rate and Rhythm: Normal rate and regular rhythm. Pulses: Normal pulses. Heart sounds: Normal heart sounds. No murmur heard. No friction rub. No gallop. Pulmonary:      Effort: Pulmonary effort is normal.      Breath sounds: Normal breath sounds. Abdominal:      General: Abdomen is flat. Bowel sounds are normal.      Palpations: Abdomen is soft.    Musculoskeletal:      Cervical back: Normal range of motion and neck supple. Skin:     General: Skin is warm and dry. Neurological:      Mental Status: She is alert. Pertinent Labs/Studies:      Assessment and orders:       ICD-10-CM ICD-9-CM    1. Primary hypertension  I10 401.9       2. KAVON (obstructive sleep apnea)  G47.33 327.23       3. DDD (degenerative disc disease), lumbar  M51.36 722.52 gabapentin (NEURONTIN) 300 mg capsule      4. Controlled substance agreement signed  Z79.899 V58.69       5. Hypercholesterolemia  E78.00 272.0       6. Arthritis  M19.90 716.90       7. Neuropathic pain  M79.2 729.2 gabapentin (NEURONTIN) 300 mg capsule      8. Psychophysiological insomnia  F51.04 307.42 traZODone (DESYREL) 50 mg tablet        Diagnoses and all orders for this visit:    1. Primary hypertension: Our goal is to normalize the blood pressure to decrease the risks of strokes and heart attacks. The patient is in agreement with the plan. 2. KAVON (obstructive sleep apnea): Noncompliant    3. DDD (degenerative disc disease), lumbar: Refill on Gabapentin.  reviewed and UDS reviewed. -     gabapentin (NEURONTIN) 300 mg capsule; TAKE 2 CAPSULES BY MOUTH EVERY NIGHT    4. Controlled substance agreement signed    5. Hypercholesterolemia: The patient is aware of our goal to reduce or eliminate the long term problems (such as strokes and heart attacks) related to poorly controlled Triglycerides, LDL, Cholesterol. 6. Arthritis    7. Neuropathic pain: Causing issues with insomnia  -     gabapentin (NEURONTIN) 300 mg capsule; TAKE 2 CAPSULES BY MOUTH EVERY NIGHT      Follow-up and Dispositions    Return in about 3 months (around 7/6/2023). I have discussed the diagnosis with the patient and the intended plan as seen in the above orders. Social history, medical history, and labs were reviewed. The patient has received an after-visit summary and questions were answered concerning future plans.   I have discussed medication side effects and warnings with the patient as well.     MD MILLY Tolliver & DERRICK ARREDONDO [facility]  04/06/23

## 2023-04-19 ENCOUNTER — TELEPHONE (OUTPATIENT)
Dept: FAMILY MEDICINE CLINIC | Age: 68
End: 2023-04-19

## 2023-04-19 DIAGNOSIS — M51.36 DDD (DEGENERATIVE DISC DISEASE), LUMBAR: Chronic | ICD-10-CM

## 2023-04-19 DIAGNOSIS — M79.2 NEUROPATHIC PAIN: ICD-10-CM

## 2023-04-19 RX ORDER — GABAPENTIN 300 MG/1
CAPSULE ORAL
Qty: 60 CAPSULE | Refills: 2 | Status: SHIPPED | OUTPATIENT
Start: 2023-04-19

## 2023-04-19 NOTE — TELEPHONE ENCOUNTER
Pt would like all of her medications to go to [facility]. Pt does not want to use the mail order pharmacy any more. Please advise.

## 2023-04-25 ENCOUNTER — PATIENT MESSAGE (OUTPATIENT)
Dept: FAMILY MEDICINE CLINIC | Age: 68
End: 2023-04-25

## 2023-04-26 RX ORDER — TRAZODONE HYDROCHLORIDE 100 MG/1
100 TABLET ORAL
Qty: 60 TABLET | Refills: 1 | Status: SHIPPED | OUTPATIENT
Start: 2023-04-26

## 2023-04-26 NOTE — TELEPHONE ENCOUNTER
From: Kenia Alcaraz  To: Marina Forrest MD  Sent: 4/25/2023 9:18 PM EDT  Subject: Trazodone    I'm still waking up several times a night. Lesa Kenney I don't feel like it isn't doing anything for me

## 2023-06-26 RX ORDER — LISINOPRIL 5 MG/1
TABLET ORAL
Qty: 90 TABLET | Refills: 1 | Status: SHIPPED | OUTPATIENT
Start: 2023-06-26

## 2023-07-06 ENCOUNTER — OFFICE VISIT (OUTPATIENT)
Facility: CLINIC | Age: 68
End: 2023-07-06
Payer: MEDICARE

## 2023-07-06 VITALS
HEART RATE: 63 BPM | RESPIRATION RATE: 16 BRPM | BODY MASS INDEX: 26.83 KG/M2 | TEMPERATURE: 98.7 F | SYSTOLIC BLOOD PRESSURE: 123 MMHG | DIASTOLIC BLOOD PRESSURE: 54 MMHG | OXYGEN SATURATION: 98 % | WEIGHT: 142 LBS

## 2023-07-06 DIAGNOSIS — Z12.11 COLON CANCER SCREENING: ICD-10-CM

## 2023-07-06 DIAGNOSIS — E55.9 VITAMIN D DEFICIENCY: ICD-10-CM

## 2023-07-06 DIAGNOSIS — M19.90 ARTHRITIS: ICD-10-CM

## 2023-07-06 DIAGNOSIS — G89.4 CHRONIC PAIN SYNDROME: ICD-10-CM

## 2023-07-06 DIAGNOSIS — E78.00 HYPERCHOLESTEROLEMIA: ICD-10-CM

## 2023-07-06 DIAGNOSIS — I10 PRIMARY HYPERTENSION: ICD-10-CM

## 2023-07-06 DIAGNOSIS — B00.1 FEVER BLISTER: ICD-10-CM

## 2023-07-06 DIAGNOSIS — G47.33 OSA (OBSTRUCTIVE SLEEP APNEA): ICD-10-CM

## 2023-07-06 DIAGNOSIS — M51.36 DDD (DEGENERATIVE DISC DISEASE), LUMBAR: Primary | ICD-10-CM

## 2023-07-06 PROBLEM — K92.1 HEMATOCHEZIA: Status: RESOLVED | Noted: 2021-12-05 | Resolved: 2023-07-06

## 2023-07-06 PROBLEM — R10.9 ABDOMINAL PAIN: Status: RESOLVED | Noted: 2021-12-05 | Resolved: 2023-07-06

## 2023-07-06 PROBLEM — K52.9 COLITIS: Status: RESOLVED | Noted: 2021-12-05 | Resolved: 2023-07-06

## 2023-07-06 PROBLEM — D72.829 LEUKOCYTOSIS: Status: RESOLVED | Noted: 2021-12-05 | Resolved: 2023-07-06

## 2023-07-06 PROCEDURE — 3078F DIAST BP <80 MM HG: CPT | Performed by: FAMILY MEDICINE

## 2023-07-06 PROCEDURE — 3074F SYST BP LT 130 MM HG: CPT | Performed by: FAMILY MEDICINE

## 2023-07-06 PROCEDURE — 99214 OFFICE O/P EST MOD 30 MIN: CPT | Performed by: FAMILY MEDICINE

## 2023-07-06 PROCEDURE — 1123F ACP DISCUSS/DSCN MKR DOCD: CPT | Performed by: FAMILY MEDICINE

## 2023-07-06 RX ORDER — TRAZODONE HYDROCHLORIDE 100 MG/1
100 TABLET ORAL NIGHTLY
COMMUNITY
Start: 2023-04-27

## 2023-07-06 RX ORDER — CHLORAL HYDRATE 500 MG
CAPSULE ORAL DAILY
COMMUNITY

## 2023-07-06 SDOH — ECONOMIC STABILITY: HOUSING INSECURITY
IN THE LAST 12 MONTHS, WAS THERE A TIME WHEN YOU DID NOT HAVE A STEADY PLACE TO SLEEP OR SLEPT IN A SHELTER (INCLUDING NOW)?: NO

## 2023-07-06 SDOH — ECONOMIC STABILITY: FOOD INSECURITY: WITHIN THE PAST 12 MONTHS, THE FOOD YOU BOUGHT JUST DIDN'T LAST AND YOU DIDN'T HAVE MONEY TO GET MORE.: NEVER TRUE

## 2023-07-06 SDOH — ECONOMIC STABILITY: INCOME INSECURITY: HOW HARD IS IT FOR YOU TO PAY FOR THE VERY BASICS LIKE FOOD, HOUSING, MEDICAL CARE, AND HEATING?: SOMEWHAT HARD

## 2023-07-06 SDOH — ECONOMIC STABILITY: FOOD INSECURITY: WITHIN THE PAST 12 MONTHS, YOU WORRIED THAT YOUR FOOD WOULD RUN OUT BEFORE YOU GOT MONEY TO BUY MORE.: NEVER TRUE

## 2023-07-06 ASSESSMENT — ENCOUNTER SYMPTOMS
ABDOMINAL DISTENTION: 0
ABDOMINAL PAIN: 0
BACK PAIN: 1

## 2023-07-06 NOTE — PROGRESS NOTES
[facility]    History of Present Illness: Yovani Mortensen is a 70 y.o. female with history of HTN, LAKIA, DDD, HLD  CC: Follow up  History provided by patient and Records    HPI:  Chronic Pain: Secondary to Lumbar DDD, patient is currently using Gabapentin 600 mg Nightly. Patient has taken Tramadol as well PRN, very infrequent dosing. Patient has tried Tylenol, NSAIDS, and PT in the past without significant improvement. Noting that she has had increased episodes with some radiating numbness in the legs bilaterally. Patient is willing to try PT again. Hypertension Follow up: The patient reports:  taking medications as instructed, no medication side effects noted, no TIA's, no chest pain on exertion, no dyspnea on exertion, no swelling of ankles, no orthostatic dizziness or lightheadedness, no orthopnea or paroxysmal nocturnal dyspnea. BP Readings from Last 3 Encounters:   07/06/23 (!) 123/54   04/06/23 (!) 119/53   01/06/23 137/62      Hypertriglyceridemia Follow up:   Cardiovascular risks for her are: hypertension  hyperlipidemia. Current Medications:  rosuvastatin - 10 MG    Compliance: Yes   Myalgias: No   Fatigue: No   Other side effects: No     Wt Readings from Last 3 Encounters:   07/06/23 142 lb (64.4 kg)   04/06/23 145 lb 9.6 oz (66 kg)   01/06/23 141 lb 12.8 oz (64.3 kg)     Lab Results   Component Value Date/Time    CHOL 147 01/06/2023 09:55 AM    HDL 78 01/06/2023 09:55 AM      Lab Results   Component Value Date/Time    ALT 27 01/06/2023 09:55 AM    AST 25 01/06/2023 09:55 AM       LAKIA: Does not use C-Pap, did not tolerate well. Sleeping only 4-5 hours nightly.      Osteoporosis: Taking Alendronate     Insomnia: Patient is taking Trazodone, but notes she has waking up every few hours due to back pain    Health Maintenance  Health Maintenance Due   Topic Date Due    COVID-19 Vaccine (1) Never done    Shingles vaccine (3 of 3) 11/26/2019    A1C test (Diabetic or

## 2023-07-06 NOTE — PROGRESS NOTES
1. Have you been to the ER, urgent care clinic since your last visit? Hospitalized since your last visit?no    2. Have you seen or consulted any other health care providers outside of the [address] [place] [address] since your last visit? Include any pap smears or colon screening. no  Reviewed record in preparation for visit and have necessary documentation  Pt did not bring medication to office visit for review  opportunity was given for questions      3. For patients aged 43-73: Has the patient had a colonoscopy / FIT/ Cologuard? No      If the patient is female:    4. For patients aged 43-66: Has the patient had a mammogram within the past 2 years? no      5. For patients aged 21-65: Has the patient had a pap smear?  N/a      Goals that were addressed and/or need to be completed during or after this appointment include   Health Maintenance Due   Topic Date Due    COVID-19 Vaccine (1) Never done    Low dose CT lung screening &/or counseling  Never done    Shingles vaccine (3 of 3) 11/26/2019    Breast cancer screen  05/29/2021    A1C test (Diabetic or Prediabetic)  07/16/2021    Colorectal Cancer Screen  06/15/2023

## 2023-07-07 LAB
25(OH)D3 SERPL-MCNC: 64.4 NG/ML (ref 30–100)
ALBUMIN SERPL-MCNC: 4 G/DL (ref 3.5–5)
ALBUMIN/GLOB SERPL: 1.4 (ref 1.1–2.2)
ALP SERPL-CCNC: 65 U/L (ref 45–117)
ALT SERPL-CCNC: 20 U/L (ref 12–78)
ANION GAP SERPL CALC-SCNC: 3 MMOL/L (ref 5–15)
AST SERPL-CCNC: 18 U/L (ref 15–37)
BILIRUB SERPL-MCNC: 0.5 MG/DL (ref 0.2–1)
BUN SERPL-MCNC: 21 MG/DL (ref 6–20)
BUN/CREAT SERPL: 24 (ref 12–20)
CALCIUM SERPL-MCNC: 9.3 MG/DL (ref 8.5–10.1)
CHLORIDE SERPL-SCNC: 106 MMOL/L (ref 97–108)
CHOLEST SERPL-MCNC: 138 MG/DL
CO2 SERPL-SCNC: 28 MMOL/L (ref 21–32)
CREAT SERPL-MCNC: 0.87 MG/DL (ref 0.55–1.02)
ERYTHROCYTE [DISTWIDTH] IN BLOOD BY AUTOMATED COUNT: 13.3 % (ref 11.5–14.5)
GLOBULIN SER CALC-MCNC: 2.9 G/DL (ref 2–4)
GLUCOSE SERPL-MCNC: 89 MG/DL (ref 65–100)
HCT VFR BLD AUTO: 36.8 % (ref 35–47)
HDLC SERPL-MCNC: 77 MG/DL
HDLC SERPL: 1.8 (ref 0–5)
HGB BLD-MCNC: 11.3 G/DL (ref 11.5–16)
LDLC SERPL CALC-MCNC: 48.2 MG/DL (ref 0–100)
MAGNESIUM SERPL-MCNC: 2.3 MG/DL (ref 1.6–2.4)
MCH RBC QN AUTO: 29.6 PG (ref 26–34)
MCHC RBC AUTO-ENTMCNC: 30.7 G/DL (ref 30–36.5)
MCV RBC AUTO: 96.3 FL (ref 80–99)
NRBC # BLD: 0 K/UL (ref 0–0.01)
NRBC BLD-RTO: 0 PER 100 WBC
PLATELET # BLD AUTO: 244 K/UL (ref 150–400)
PMV BLD AUTO: 11.9 FL (ref 8.9–12.9)
POTASSIUM SERPL-SCNC: 4.5 MMOL/L (ref 3.5–5.1)
PROT SERPL-MCNC: 6.9 G/DL (ref 6.4–8.2)
RBC # BLD AUTO: 3.82 M/UL (ref 3.8–5.2)
SODIUM SERPL-SCNC: 137 MMOL/L (ref 136–145)
TRIGL SERPL-MCNC: 64 MG/DL
VLDLC SERPL CALC-MCNC: 12.8 MG/DL
WBC # BLD AUTO: 5.9 K/UL (ref 3.6–11)

## 2023-07-12 LAB — HEMOCCULT STL QL IA: NEGATIVE

## 2023-07-17 ENCOUNTER — OFFICE VISIT (OUTPATIENT)
Facility: CLINIC | Age: 68
End: 2023-07-17
Payer: MEDICARE

## 2023-07-17 VITALS
OXYGEN SATURATION: 96 % | WEIGHT: 142 LBS | BODY MASS INDEX: 26.81 KG/M2 | DIASTOLIC BLOOD PRESSURE: 57 MMHG | HEIGHT: 61 IN | HEART RATE: 71 BPM | RESPIRATION RATE: 20 BRPM | SYSTOLIC BLOOD PRESSURE: 136 MMHG | TEMPERATURE: 97.6 F

## 2023-07-17 DIAGNOSIS — M79.604 RIGHT LEG PAIN: Primary | ICD-10-CM

## 2023-07-17 DIAGNOSIS — M62.838 MUSCLE SPASM: ICD-10-CM

## 2023-07-17 DIAGNOSIS — M51.36 DDD (DEGENERATIVE DISC DISEASE), LUMBAR: Primary | ICD-10-CM

## 2023-07-17 DIAGNOSIS — M51.36 DDD (DEGENERATIVE DISC DISEASE), LUMBAR: ICD-10-CM

## 2023-07-17 PROCEDURE — 3075F SYST BP GE 130 - 139MM HG: CPT | Performed by: FAMILY MEDICINE

## 2023-07-17 PROCEDURE — 3078F DIAST BP <80 MM HG: CPT | Performed by: FAMILY MEDICINE

## 2023-07-17 PROCEDURE — 1123F ACP DISCUSS/DSCN MKR DOCD: CPT | Performed by: FAMILY MEDICINE

## 2023-07-17 PROCEDURE — 99213 OFFICE O/P EST LOW 20 MIN: CPT | Performed by: FAMILY MEDICINE

## 2023-07-17 RX ORDER — TIZANIDINE 2 MG/1
2 TABLET ORAL 3 TIMES DAILY PRN
Qty: 30 TABLET | Refills: 0 | Status: SHIPPED | OUTPATIENT
Start: 2023-07-17

## 2023-07-17 ASSESSMENT — ENCOUNTER SYMPTOMS: CHEST TIGHTNESS: 0

## 2023-07-18 LAB
COMMENT:: NORMAL
SPECIMEN HOLD: NORMAL

## 2023-07-22 LAB — DRUGS UR: NORMAL

## 2023-08-04 ENCOUNTER — TELEPHONE (OUTPATIENT)
Facility: CLINIC | Age: 68
End: 2023-08-04

## 2023-08-04 DIAGNOSIS — M51.36 OTHER INTERVERTEBRAL DISC DEGENERATION, LUMBAR REGION: ICD-10-CM

## 2023-08-04 DIAGNOSIS — M79.2 NEURALGIA AND NEURITIS, UNSPECIFIED: ICD-10-CM

## 2023-08-04 DIAGNOSIS — M51.36 DDD (DEGENERATIVE DISC DISEASE), LUMBAR: Primary | ICD-10-CM

## 2023-08-04 RX ORDER — TRAMADOL HYDROCHLORIDE 50 MG/1
50 TABLET ORAL 2 TIMES DAILY PRN
Qty: 14 TABLET | Refills: 0 | Status: SHIPPED | OUTPATIENT
Start: 2023-08-04 | End: 2023-08-11

## 2023-08-04 RX ORDER — GABAPENTIN 300 MG/1
CAPSULE ORAL
Qty: 60 CAPSULE | Refills: 2 | Status: SHIPPED | OUTPATIENT
Start: 2023-08-04 | End: 2023-11-02

## 2023-08-04 NOTE — TELEPHONE ENCOUNTER
reviewed, appropriate. History of Tramadol use for short course for pain exacerbation. Last used 10/22.       MD KARUNA Castro & HEYDI REY [facility]  08/04/23

## 2023-10-10 ENCOUNTER — OFFICE VISIT (OUTPATIENT)
Facility: CLINIC | Age: 68
End: 2023-10-10
Payer: MEDICARE

## 2023-10-10 VITALS
TEMPERATURE: 98 F | DIASTOLIC BLOOD PRESSURE: 56 MMHG | RESPIRATION RATE: 18 BRPM | HEART RATE: 58 BPM | WEIGHT: 138.6 LBS | HEIGHT: 61 IN | BODY MASS INDEX: 26.17 KG/M2 | OXYGEN SATURATION: 96 % | SYSTOLIC BLOOD PRESSURE: 103 MMHG

## 2023-10-10 DIAGNOSIS — M19.90 ARTHRITIS: ICD-10-CM

## 2023-10-10 DIAGNOSIS — E55.9 VITAMIN D DEFICIENCY: ICD-10-CM

## 2023-10-10 DIAGNOSIS — M51.36 DDD (DEGENERATIVE DISC DISEASE), LUMBAR: ICD-10-CM

## 2023-10-10 DIAGNOSIS — G47.33 OSA (OBSTRUCTIVE SLEEP APNEA): ICD-10-CM

## 2023-10-10 DIAGNOSIS — G89.4 CHRONIC PAIN SYNDROME: ICD-10-CM

## 2023-10-10 DIAGNOSIS — I10 PRIMARY HYPERTENSION: Primary | ICD-10-CM

## 2023-10-10 DIAGNOSIS — E78.00 HYPERCHOLESTEROLEMIA: ICD-10-CM

## 2023-10-10 DIAGNOSIS — R73.09 ELEVATED HEMOGLOBIN A1C: ICD-10-CM

## 2023-10-10 PROCEDURE — 99214 OFFICE O/P EST MOD 30 MIN: CPT | Performed by: FAMILY MEDICINE

## 2023-10-10 PROCEDURE — 3074F SYST BP LT 130 MM HG: CPT | Performed by: FAMILY MEDICINE

## 2023-10-10 PROCEDURE — 1123F ACP DISCUSS/DSCN MKR DOCD: CPT | Performed by: FAMILY MEDICINE

## 2023-10-10 PROCEDURE — G0008 ADMIN INFLUENZA VIRUS VAC: HCPCS | Performed by: FAMILY MEDICINE

## 2023-10-10 PROCEDURE — 3078F DIAST BP <80 MM HG: CPT | Performed by: FAMILY MEDICINE

## 2023-10-10 PROCEDURE — 90694 VACC AIIV4 NO PRSRV 0.5ML IM: CPT | Performed by: FAMILY MEDICINE

## 2023-10-10 ASSESSMENT — ENCOUNTER SYMPTOMS: CHEST TIGHTNESS: 0

## 2023-10-10 ASSESSMENT — PATIENT HEALTH QUESTIONNAIRE - PHQ9
SUM OF ALL RESPONSES TO PHQ QUESTIONS 1-9: 0
2. FEELING DOWN, DEPRESSED OR HOPELESS: 0
SUM OF ALL RESPONSES TO PHQ9 QUESTIONS 1 & 2: 0
SUM OF ALL RESPONSES TO PHQ QUESTIONS 1-9: 0
1. LITTLE INTEREST OR PLEASURE IN DOING THINGS: 0
SUM OF ALL RESPONSES TO PHQ QUESTIONS 1-9: 0
SUM OF ALL RESPONSES TO PHQ QUESTIONS 1-9: 0

## 2023-10-10 NOTE — PROGRESS NOTES
1. \"Have you been to the ER, urgent care clinic since your last visit? Hospitalized since your last visit? \" no    2. \"Have you seen or consulted any other health care providers outside of the [address] [place] [address] since your last visit? \" no         Health Maintenance Due   Topic Date Due    COVID-19 Vaccine (1) Never done    Shingles vaccine (3 of 3) 11/26/2019    A1C test (Diabetic or Prediabetic)  07/16/2021    Flu vaccine (1) 08/01/2023

## 2023-10-10 NOTE — PROGRESS NOTES
[facility]    History of Present Illness: Sedrick Garcia is a 70 y.o. female with history of HTN, LAKIA, DDD, HLD  CC: Follow up  History provided by patient and Records    HPI:  Chronic Pain: Secondary to Lumbar DDD, patient is currently using Gabapentin 600 mg Nightly. Patient has taken Tramadol as well PRN, very infrequent dosing. Patient has tried Tylenol, NSAIDS, and PT in the past without significant improvement. Noting that she has had increased episodes with some radiating numbness in the legs bilaterally. Patient is willing to try PT again. Hypertension Follow up: The patient reports:  taking medications as instructed, no medication side effects noted, no TIA's, no chest pain on exertion, no dyspnea on exertion, no swelling of ankles, no orthostatic dizziness or lightheadedness, no orthopnea or paroxysmal nocturnal dyspnea. BP Readings from Last 3 Encounters:   10/10/23 (!) 103/56   07/17/23 (!) 136/57   07/06/23 (!) 123/54      Hypertriglyceridemia Follow up:   Cardiovascular risks for her are: hypertension  hyperlipidemia.    Current Medications:  rosuvastatin - 10 MG    Compliance: Yes   Myalgias: No   Fatigue: No   Other side effects: No     Wt Readings from Last 3 Encounters:   10/10/23 138 lb 9.6 oz (62.9 kg)   07/17/23 142 lb (64.4 kg)   07/06/23 142 lb (64.4 kg)     Lab Results   Component Value Date/Time    CHOL 138 07/06/2023 11:05 AM    HDL 77 07/06/2023 11:05 AM      Lab Results   Component Value Date/Time    ALT 20 07/06/2023 11:05 AM    AST 18 07/06/2023 11:05 AM       LAKIA: Does not use C-pap, \"Felt I was drowning\"    Insomnia: Patient is taking Trazodone, but notes she has waking up every few hours due to back pain    Health Maintenance  Health Maintenance Due   Topic Date Due    COVID-19 Vaccine (1) Never done    Shingles vaccine (3 of 3) 11/26/2019    A1C test (Diabetic or Prediabetic)  07/16/2021    Flu vaccine (1) 08/01/2023       Past Medical, Family,

## 2023-10-11 LAB
25(OH)D3 SERPL-MCNC: 71.7 NG/ML (ref 30–100)
ALBUMIN SERPL-MCNC: 3.8 G/DL (ref 3.5–5)
ALBUMIN/GLOB SERPL: 1.2 (ref 1.1–2.2)
ALP SERPL-CCNC: 64 U/L (ref 45–117)
ALT SERPL-CCNC: 23 U/L (ref 12–78)
ANION GAP SERPL CALC-SCNC: 6 MMOL/L (ref 5–15)
AST SERPL-CCNC: 18 U/L (ref 15–37)
BILIRUB SERPL-MCNC: 0.6 MG/DL (ref 0.2–1)
BUN SERPL-MCNC: 16 MG/DL (ref 6–20)
BUN/CREAT SERPL: 16 (ref 12–20)
CALCIUM SERPL-MCNC: 9.2 MG/DL (ref 8.5–10.1)
CHLORIDE SERPL-SCNC: 105 MMOL/L (ref 97–108)
CHOLEST SERPL-MCNC: 142 MG/DL
CO2 SERPL-SCNC: 26 MMOL/L (ref 21–32)
CREAT SERPL-MCNC: 0.98 MG/DL (ref 0.55–1.02)
ERYTHROCYTE [DISTWIDTH] IN BLOOD BY AUTOMATED COUNT: 13 % (ref 11.5–14.5)
EST. AVERAGE GLUCOSE BLD GHB EST-MCNC: 117 MG/DL
GLOBULIN SER CALC-MCNC: 3.1 G/DL (ref 2–4)
GLUCOSE SERPL-MCNC: 95 MG/DL (ref 65–100)
HBA1C MFR BLD: 5.7 % (ref 4–5.6)
HCT VFR BLD AUTO: 39.1 % (ref 35–47)
HDLC SERPL-MCNC: 69 MG/DL
HDLC SERPL: 2.1 (ref 0–5)
HGB BLD-MCNC: 12.2 G/DL (ref 11.5–16)
LDLC SERPL CALC-MCNC: 55.4 MG/DL (ref 0–100)
MCH RBC QN AUTO: 29.5 PG (ref 26–34)
MCHC RBC AUTO-ENTMCNC: 31.2 G/DL (ref 30–36.5)
MCV RBC AUTO: 94.7 FL (ref 80–99)
NRBC # BLD: 0 K/UL (ref 0–0.01)
NRBC BLD-RTO: 0 PER 100 WBC
PLATELET # BLD AUTO: 266 K/UL (ref 150–400)
PMV BLD AUTO: 10.9 FL (ref 8.9–12.9)
POTASSIUM SERPL-SCNC: 4.8 MMOL/L (ref 3.5–5.1)
PROT SERPL-MCNC: 6.9 G/DL (ref 6.4–8.2)
RBC # BLD AUTO: 4.13 M/UL (ref 3.8–5.2)
SODIUM SERPL-SCNC: 137 MMOL/L (ref 136–145)
TRIGL SERPL-MCNC: 88 MG/DL
VLDLC SERPL CALC-MCNC: 17.6 MG/DL
WBC # BLD AUTO: 6.4 K/UL (ref 3.6–11)

## 2023-10-31 RX ORDER — ROSUVASTATIN CALCIUM 10 MG/1
TABLET, COATED ORAL
Qty: 90 TABLET | Refills: 1 | Status: SHIPPED | OUTPATIENT
Start: 2023-10-31

## 2023-11-03 ENCOUNTER — OFFICE VISIT (OUTPATIENT)
Facility: CLINIC | Age: 68
End: 2023-11-03
Payer: MEDICARE

## 2023-11-03 VITALS
DIASTOLIC BLOOD PRESSURE: 59 MMHG | HEIGHT: 61 IN | RESPIRATION RATE: 16 BRPM | BODY MASS INDEX: 26.32 KG/M2 | OXYGEN SATURATION: 99 % | TEMPERATURE: 98.3 F | WEIGHT: 139.4 LBS | HEART RATE: 58 BPM | SYSTOLIC BLOOD PRESSURE: 120 MMHG
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
## 2023-11-03 DIAGNOSIS — Z23 VACCINE FOR TETANUS TOXOID: ICD-10-CM

## 2023-11-03 PROCEDURE — 99213 OFFICE O/P EST LOW 20 MIN: CPT | Performed by: FAMILY MEDICINE

## 2023-11-03 PROCEDURE — 3074F SYST BP LT 130 MM HG: CPT | Performed by: FAMILY MEDICINE

## 2023-11-03 PROCEDURE — 1123F ACP DISCUSS/DSCN MKR DOCD: CPT | Performed by: FAMILY MEDICINE

## 2023-11-03 PROCEDURE — 90471 IMMUNIZATION ADMIN: CPT | Performed by: FAMILY MEDICINE

## 2023-11-03 PROCEDURE — 3078F DIAST BP <80 MM HG: CPT | Performed by: FAMILY MEDICINE

## 2023-11-03 PROCEDURE — 90714 TD VACC NO PRESV 7 YRS+ IM: CPT | Performed by: FAMILY MEDICINE

## 2023-11-03 NOTE — PROGRESS NOTES
[facility]    History of Present Illness: Kirby Wilson is a 70 y.o. female with history of HTN, LAKIA, DDD, HLD  CC: Bite from mouse  History provided by patient and Records    HPI:  This morning patient was bitten by mouse while patient removing trap from home. Last Tetanus more than 5 years ago. Health Maintenance  Health Maintenance Due   Topic Date Due    COVID-19 Vaccine (1) Never done    Shingles vaccine (3 of 3) 11/26/2019       Past Medical, Family, and Social History:     Current Outpatient Medications on File Prior to Visit   Medication Sig Dispense Refill    rosuvastatin (CRESTOR) 10 MG tablet TAKE ONE TABLET BY MOUTH NIGHTLY 90 tablet 1    traZODone (DESYREL) 100 MG tablet TAKE ONE TABLET BY MOUTH NIGHTLY 90 tablet 1    Magnesium Hydroxide (MAGNESIA PO) Take by mouth      tiZANidine (ZANAFLEX) 2 MG tablet Take 1 tablet by mouth 3 times daily as needed (Muscle pain) 30 tablet 0    Omega-3 Fatty Acids (FISH OIL) 1000 MG capsule Take by mouth daily      lisinopril (PRINIVIL;ZESTRIL) 5 MG tablet TAKE ONE TABLET BY MOUTH EVERY DAY 90 tablet 1    alendronate (FOSAMAX) 70 MG tablet TAKE ONE TABLET BY MOUTH EVERY 7 DAYS 13 tablet 1    Zinc Acetate, Oral, (ZINC ACETATE PO) Take by mouth      ascorbic acid (VITAMIN C) 500 MG tablet Take by mouth      calcium carbonate 1500 (600 Ca) MG TABS tablet Take by mouth 2 times daily      vitamin D3 (CHOLECALCIFEROL) 125 MCG (5000 UT) TABS tablet Take by mouth daily      naloxone 4 MG/0.1ML LIQD nasal spray Use 1 spray intranasally, then discard. Repeat with new spray every 2 min as needed for opioid overdose symptoms, alternating nostrils. gabapentin (NEURONTIN) 300 MG capsule TAKE 2 CAPSULES BY MOUTH EVERY NIGHT 60 capsule 2     No current facility-administered medications on file prior to visit.        Patient Active Problem List   Diagnosis    LAKIA (obstructive sleep apnea)    Hypertension    Arthritis    Hypercholesterolemia    DDD

## 2023-11-06 DIAGNOSIS — M51.36 OTHER INTERVERTEBRAL DISC DEGENERATION, LUMBAR REGION: ICD-10-CM

## 2023-11-06 DIAGNOSIS — M79.2 NEURALGIA AND NEURITIS, UNSPECIFIED: ICD-10-CM

## 2023-11-06 RX ORDER — GABAPENTIN 300 MG/1
CAPSULE ORAL
Qty: 60 CAPSULE | Refills: 2 | Status: SHIPPED | OUTPATIENT
Start: 2023-11-06 | End: 2024-02-04

## 2023-11-20 RX ORDER — ALENDRONATE SODIUM 70 MG/1
TABLET ORAL
Qty: 12 TABLET | Refills: 1 | Status: SHIPPED | OUTPATIENT
Start: 2023-11-20

## 2023-12-04 DIAGNOSIS — M51.36 DDD (DEGENERATIVE DISC DISEASE), LUMBAR: ICD-10-CM

## 2023-12-04 RX ORDER — TRAMADOL HYDROCHLORIDE 50 MG/1
TABLET ORAL
Qty: 14 TABLET | Refills: 0 | Status: SHIPPED | OUTPATIENT
Start: 2023-12-04 | End: 2024-01-03

## 2023-12-27 DIAGNOSIS — M19.90 ARTHRITIS: Primary | ICD-10-CM

## 2023-12-27 RX ORDER — MELOXICAM 7.5 MG/1
7.5 TABLET ORAL DAILY
Qty: 30 TABLET | Refills: 1 | Status: SHIPPED | OUTPATIENT
Start: 2023-12-27

## 2024-01-05 RX ORDER — LISINOPRIL 5 MG/1
TABLET ORAL
Qty: 90 TABLET | Refills: 1 | Status: SHIPPED | OUTPATIENT
Start: 2024-01-05

## 2024-01-09 ENCOUNTER — OFFICE VISIT (OUTPATIENT)
Facility: CLINIC | Age: 69
End: 2024-01-09
Payer: MEDICARE

## 2024-01-09 VITALS
RESPIRATION RATE: 18 BRPM | HEART RATE: 62 BPM | OXYGEN SATURATION: 98 % | HEIGHT: 61 IN | BODY MASS INDEX: 26.73 KG/M2 | WEIGHT: 141.6 LBS | TEMPERATURE: 97.3 F | DIASTOLIC BLOOD PRESSURE: 60 MMHG | SYSTOLIC BLOOD PRESSURE: 125 MMHG

## 2024-01-09 DIAGNOSIS — G47.33 OSA (OBSTRUCTIVE SLEEP APNEA): ICD-10-CM

## 2024-01-09 DIAGNOSIS — E78.00 HYPERCHOLESTEROLEMIA: ICD-10-CM

## 2024-01-09 DIAGNOSIS — I10 PRIMARY HYPERTENSION: Primary | ICD-10-CM

## 2024-01-09 DIAGNOSIS — M51.36 DDD (DEGENERATIVE DISC DISEASE), LUMBAR: ICD-10-CM

## 2024-01-09 DIAGNOSIS — R73.09 ELEVATED HEMOGLOBIN A1C: ICD-10-CM

## 2024-01-09 DIAGNOSIS — M19.90 ARTHRITIS: ICD-10-CM

## 2024-01-09 PROCEDURE — 3078F DIAST BP <80 MM HG: CPT | Performed by: FAMILY MEDICINE

## 2024-01-09 PROCEDURE — 1123F ACP DISCUSS/DSCN MKR DOCD: CPT | Performed by: FAMILY MEDICINE

## 2024-01-09 PROCEDURE — 99214 OFFICE O/P EST MOD 30 MIN: CPT | Performed by: FAMILY MEDICINE

## 2024-01-09 PROCEDURE — 3074F SYST BP LT 130 MM HG: CPT | Performed by: FAMILY MEDICINE

## 2024-01-09 ASSESSMENT — ENCOUNTER SYMPTOMS
CHEST TIGHTNESS: 0
ABDOMINAL PAIN: 0

## 2024-01-09 ASSESSMENT — PATIENT HEALTH QUESTIONNAIRE - PHQ9
1. LITTLE INTEREST OR PLEASURE IN DOING THINGS: 0
SUM OF ALL RESPONSES TO PHQ QUESTIONS 1-9: 0
SUM OF ALL RESPONSES TO PHQ9 QUESTIONS 1 & 2: 0
SUM OF ALL RESPONSES TO PHQ QUESTIONS 1-9: 0
2. FEELING DOWN, DEPRESSED OR HOPELESS: 0
SUM OF ALL RESPONSES TO PHQ QUESTIONS 1-9: 0
SUM OF ALL RESPONSES TO PHQ QUESTIONS 1-9: 0

## 2024-01-09 NOTE — PROGRESS NOTES
[facility]    History of Present Illness:   Sonia Porter is a 68 y.o. female with history of HTN, LAKIA, DDD, HLD   CC: Follow up  History provided by patient and Records    HPI:  Chronic Pain: Secondary to Lumbar DDD, patient is currently using Gabapentin 600 mg Nightly. Patient has taken Tramadol as well PRN, very infrequent dosing.  Patient has tried Tylenol, NSAIDS, and PT in the past without significant improvement.  Noting that she has had increased episodes with some radiating numbness in the legs bilaterally.  Patient is willing to try PT again.     Hypertension Follow up:  The patient reports:  taking medications as instructed, no medication side effects noted, no TIA's, no chest pain on exertion, no dyspnea on exertion, no swelling of ankles, no orthostatic dizziness or lightheadedness, no orthopnea or paroxysmal nocturnal dyspnea.     BP Readings from Last 3 Encounters:   01/09/24 125/60   11/03/23 (!) 120/59   10/10/23 (!) 103/56      Hypertriglyceridemia Follow up:   Cardiovascular risks for her are: hypertension  hyperlipidemia.   Current Medications:  rosuvastatin - 10 MG    Compliance: Yes   Myalgias: No   Fatigue: No   Other side effects: No     Wt Readings from Last 3 Encounters:   01/09/24 64.2 kg (141 lb 9.6 oz)   11/03/23 63.2 kg (139 lb 6.4 oz)   10/10/23 62.9 kg (138 lb 9.6 oz)     Lab Results   Component Value Date/Time    CHOL 142 10/10/2023 10:30 AM    HDL 69 10/10/2023 10:30 AM      Lab Results   Component Value Date/Time    ALT 23 10/10/2023 10:30 AM    AST 18 10/10/2023 10:30 AM       Osteoporosis: Taking Fosamax, taking Vitamin D3 and Calcium as well.    Insomnia: Trazodone at this time      Health Maintenance  Health Maintenance Due   Topic Date Due    Shingles vaccine (3 of 3) 11/26/2019       Past Medical, Family, and Social History:     Current Outpatient Medications on File Prior to Visit   Medication Sig Dispense Refill    lisinopril (PRINIVIL;ZESTRIL) 5 MG

## 2024-01-09 NOTE — PROGRESS NOTES
1. \"Have you been to the ER, urgent care clinic since your last visit?  Hospitalized since your last visit?\" no    2. \"Have you seen or consulted any other health care providers outside of the [facility] since your last visit?\" no   Health Maintenance Due   Topic Date Due    COVID-19 Vaccine (1) Never done    Respiratory Syncytial Virus (RSV) Pregnant or age 60 yrs+ (1 - 1-dose 60+ series) Never done    Shingles vaccine (3 of 3) 11/26/2019

## 2024-01-23 DIAGNOSIS — I10 PRIMARY HYPERTENSION: ICD-10-CM

## 2024-01-23 DIAGNOSIS — R73.09 ELEVATED HEMOGLOBIN A1C: ICD-10-CM

## 2024-01-23 DIAGNOSIS — E78.00 HYPERCHOLESTEROLEMIA: ICD-10-CM

## 2024-01-24 LAB
ALBUMIN SERPL-MCNC: 4 G/DL (ref 3.5–5)
ALBUMIN/GLOB SERPL: 1.4 (ref 1.1–2.2)
ALP SERPL-CCNC: 72 U/L (ref 45–117)
ALT SERPL-CCNC: 24 U/L (ref 12–78)
ANION GAP SERPL CALC-SCNC: 4 MMOL/L (ref 5–15)
AST SERPL-CCNC: 18 U/L (ref 15–37)
BILIRUB SERPL-MCNC: 0.6 MG/DL (ref 0.2–1)
BUN SERPL-MCNC: 26 MG/DL (ref 6–20)
BUN/CREAT SERPL: 25 (ref 12–20)
CALCIUM SERPL-MCNC: 9.4 MG/DL (ref 8.5–10.1)
CHLORIDE SERPL-SCNC: 106 MMOL/L (ref 97–108)
CHOLEST SERPL-MCNC: 152 MG/DL
CO2 SERPL-SCNC: 27 MMOL/L (ref 21–32)
CREAT SERPL-MCNC: 1.05 MG/DL (ref 0.55–1.02)
ERYTHROCYTE [DISTWIDTH] IN BLOOD BY AUTOMATED COUNT: 13.2 % (ref 11.5–14.5)
EST. AVERAGE GLUCOSE BLD GHB EST-MCNC: 111 MG/DL
GLOBULIN SER CALC-MCNC: 2.8 G/DL (ref 2–4)
GLUCOSE SERPL-MCNC: 100 MG/DL (ref 65–100)
HBA1C MFR BLD: 5.5 % (ref 4–5.6)
HCT VFR BLD AUTO: 37.1 % (ref 35–47)
HDLC SERPL-MCNC: 81 MG/DL
HDLC SERPL: 1.9 (ref 0–5)
HGB BLD-MCNC: 11.8 G/DL (ref 11.5–16)
LDLC SERPL CALC-MCNC: 61.4 MG/DL (ref 0–100)
MCH RBC QN AUTO: 29.9 PG (ref 26–34)
MCHC RBC AUTO-ENTMCNC: 31.8 G/DL (ref 30–36.5)
MCV RBC AUTO: 93.9 FL (ref 80–99)
NRBC # BLD: 0 K/UL (ref 0–0.01)
NRBC BLD-RTO: 0 PER 100 WBC
PLATELET # BLD AUTO: 238 K/UL (ref 150–400)
PMV BLD AUTO: 11.3 FL (ref 8.9–12.9)
POTASSIUM SERPL-SCNC: 5.6 MMOL/L (ref 3.5–5.1)
PROT SERPL-MCNC: 6.8 G/DL (ref 6.4–8.2)
RBC # BLD AUTO: 3.95 M/UL (ref 3.8–5.2)
SODIUM SERPL-SCNC: 137 MMOL/L (ref 136–145)
TRIGL SERPL-MCNC: 48 MG/DL
VLDLC SERPL CALC-MCNC: 9.6 MG/DL
WBC # BLD AUTO: 6.5 K/UL (ref 3.6–11)

## 2024-02-01 DIAGNOSIS — M51.36 OTHER INTERVERTEBRAL DISC DEGENERATION, LUMBAR REGION: ICD-10-CM

## 2024-02-01 DIAGNOSIS — M79.2 NEURALGIA AND NEURITIS, UNSPECIFIED: ICD-10-CM

## 2024-02-01 RX ORDER — GABAPENTIN 300 MG/1
CAPSULE ORAL
Qty: 60 CAPSULE | Refills: 2 | Status: SHIPPED | OUTPATIENT
Start: 2024-02-01 | End: 2024-05-01

## 2024-02-15 DIAGNOSIS — M19.90 ARTHRITIS: ICD-10-CM

## 2024-02-15 RX ORDER — MELOXICAM 7.5 MG/1
7.5 TABLET ORAL DAILY
Qty: 30 TABLET | Refills: 1 | Status: SHIPPED | OUTPATIENT
Start: 2024-02-15

## 2024-04-01 RX ORDER — TRAZODONE HYDROCHLORIDE 100 MG/1
100 TABLET ORAL
Qty: 90 TABLET | Refills: 1 | Status: SHIPPED | OUTPATIENT
Start: 2024-04-01

## 2024-04-14 DIAGNOSIS — M19.90 ARTHRITIS: ICD-10-CM

## 2024-04-15 RX ORDER — MELOXICAM 7.5 MG/1
7.5 TABLET ORAL DAILY
Qty: 30 TABLET | Refills: 0 | Status: SHIPPED | OUTPATIENT
Start: 2024-04-15

## 2024-04-29 RX ORDER — ROSUVASTATIN CALCIUM 10 MG/1
TABLET, COATED ORAL
Qty: 90 TABLET | Refills: 1 | Status: SHIPPED | OUTPATIENT
Start: 2024-04-29

## 2024-04-29 RX ORDER — ALENDRONATE SODIUM 70 MG/1
TABLET ORAL
Qty: 12 TABLET | Refills: 1 | Status: SHIPPED | OUTPATIENT
Start: 2024-04-29

## 2024-05-01 ENCOUNTER — TELEPHONE (OUTPATIENT)
Facility: CLINIC | Age: 69
End: 2024-05-01

## 2024-05-01 NOTE — TELEPHONE ENCOUNTER
Call placed to pt to see if she would like to have mammogram scheduled.Refused stated she does not want one.

## 2024-05-02 DIAGNOSIS — M79.2 NEURALGIA AND NEURITIS, UNSPECIFIED: ICD-10-CM

## 2024-05-02 DIAGNOSIS — M51.36 OTHER INTERVERTEBRAL DISC DEGENERATION, LUMBAR REGION: ICD-10-CM

## 2024-05-03 RX ORDER — GABAPENTIN 300 MG/1
600 CAPSULE ORAL
Qty: 60 CAPSULE | Refills: 0 | Status: SHIPPED | OUTPATIENT
Start: 2024-05-03 | End: 2024-06-02

## 2024-05-30 DIAGNOSIS — M19.90 ARTHRITIS: ICD-10-CM

## 2024-05-30 RX ORDER — MELOXICAM 7.5 MG/1
7.5 TABLET ORAL DAILY
Qty: 90 TABLET | Refills: 1 | Status: SHIPPED | OUTPATIENT
Start: 2024-05-30

## 2024-06-12 DIAGNOSIS — M51.36 OTHER INTERVERTEBRAL DISC DEGENERATION, LUMBAR REGION: ICD-10-CM

## 2024-06-12 DIAGNOSIS — M79.2 NEURALGIA AND NEURITIS, UNSPECIFIED: ICD-10-CM

## 2024-06-13 ENCOUNTER — OFFICE VISIT (OUTPATIENT)
Facility: CLINIC | Age: 69
End: 2024-06-13

## 2024-06-13 VITALS
OXYGEN SATURATION: 99 % | RESPIRATION RATE: 18 BRPM | SYSTOLIC BLOOD PRESSURE: 137 MMHG | HEIGHT: 61 IN | BODY MASS INDEX: 26.43 KG/M2 | WEIGHT: 140 LBS | DIASTOLIC BLOOD PRESSURE: 64 MMHG | HEART RATE: 60 BPM | TEMPERATURE: 97.8 F

## 2024-06-13 DIAGNOSIS — G47.33 OSA (OBSTRUCTIVE SLEEP APNEA): ICD-10-CM

## 2024-06-13 DIAGNOSIS — M51.36 DDD (DEGENERATIVE DISC DISEASE), LUMBAR: ICD-10-CM

## 2024-06-13 DIAGNOSIS — G89.4 CHRONIC PAIN SYNDROME: ICD-10-CM

## 2024-06-13 DIAGNOSIS — I10 PRIMARY HYPERTENSION: ICD-10-CM

## 2024-06-13 DIAGNOSIS — E53.8 B12 DEFICIENCY: ICD-10-CM

## 2024-06-13 DIAGNOSIS — Z00.00 MEDICARE ANNUAL WELLNESS VISIT, SUBSEQUENT: Primary | ICD-10-CM

## 2024-06-13 DIAGNOSIS — M79.2 NEURALGIA AND NEURITIS, UNSPECIFIED: ICD-10-CM

## 2024-06-13 DIAGNOSIS — M51.36 OTHER INTERVERTEBRAL DISC DEGENERATION, LUMBAR REGION: ICD-10-CM

## 2024-06-13 DIAGNOSIS — M19.90 ARTHRITIS: ICD-10-CM

## 2024-06-13 DIAGNOSIS — E55.9 VITAMIN D DEFICIENCY: ICD-10-CM

## 2024-06-13 DIAGNOSIS — R25.2 LEG CRAMPING: ICD-10-CM

## 2024-06-13 DIAGNOSIS — E78.00 HYPERCHOLESTEROLEMIA: ICD-10-CM

## 2024-06-13 RX ORDER — GABAPENTIN 300 MG/1
600 CAPSULE ORAL
Qty: 60 CAPSULE | Refills: 0 | OUTPATIENT
Start: 2024-06-13

## 2024-06-13 RX ORDER — GABAPENTIN 300 MG/1
600 CAPSULE ORAL
Qty: 60 CAPSULE | Refills: 2 | Status: SHIPPED | OUTPATIENT
Start: 2024-06-13 | End: 2024-09-11

## 2024-06-13 ASSESSMENT — ENCOUNTER SYMPTOMS
BACK PAIN: 0
COLOR CHANGE: 0
ABDOMINAL PAIN: 0
CHEST TIGHTNESS: 0

## 2024-06-13 ASSESSMENT — LIFESTYLE VARIABLES
HOW OFTEN DO YOU HAVE A DRINK CONTAINING ALCOHOL: NEVER
HOW MANY STANDARD DRINKS CONTAINING ALCOHOL DO YOU HAVE ON A TYPICAL DAY: PATIENT DOES NOT DRINK

## 2024-06-13 ASSESSMENT — PATIENT HEALTH QUESTIONNAIRE - PHQ9
2. FEELING DOWN, DEPRESSED OR HOPELESS: NOT AT ALL
1. LITTLE INTEREST OR PLEASURE IN DOING THINGS: NOT AT ALL
SUM OF ALL RESPONSES TO PHQ QUESTIONS 1-9: 0
SUM OF ALL RESPONSES TO PHQ9 QUESTIONS 1 & 2: 0
SUM OF ALL RESPONSES TO PHQ QUESTIONS 1-9: 0

## 2024-06-13 NOTE — PROGRESS NOTES
\"Have you been to the ER, urgent care clinic since your last visit?  Hospitalized since your last visit?\"    NO    “Have you seen or consulted any other health care providers outside of [facility] since your last visit?”    NO            Click Here for Release of Records Request   
PO) Take by mouth Yes Automatic Reconciliation, Ar   ascorbic acid (VITAMIN C) 500 MG tablet Take by mouth Yes Automatic Reconciliation, Ar   calcium carbonate 1500 (600 Ca) MG TABS tablet Take by mouth 2 times daily Yes Automatic Reconciliation, Ar   vitamin D3 (CHOLECALCIFEROL) 125 MCG (5000 UT) TABS tablet Take by mouth daily Yes Automatic Reconciliation, Ar   naloxone 4 MG/0.1ML LIQD nasal spray Use 1 spray intranasally, then discard. Repeat with new spray every 2 min as needed for opioid overdose symptoms, alternating nostrils. Yes Automatic Reconciliation, Ar   Magnesium Hydroxide (MAGNESIA PO) Take by mouth  Patient not taking: Reported on 6/13/2024  Provider, Historical, MD       [facility]cluding outside providers/suppliers regularly involved in providing care):   Patient Care Team:  Miah Grant MD as PCP - General  Miah Grant MD as PCP - Empaneled Provider     Reviewed and updated this visit:  Tobacco  Allergies  Meds  Problems  Med Hx  Surg Hx  Soc Hx  Fam Hx             
Pulmonary effort is normal.      Breath sounds: Normal breath sounds.   Abdominal:      General: Abdomen is flat. Bowel sounds are normal.      Palpations: Abdomen is soft.   Musculoskeletal:         General: Normal range of motion.      Cervical back: Normal range of motion and neck supple.   Skin:     General: Skin is warm and dry.   Neurological:      General: No focal deficit present.      Mental Status: She is alert and oriented to person, place, and time.          Pertinent Labs/Studies:      Assessment and orders:       ICD-10-CM    1. Primary hypertension  I10 Comprehensive Metabolic Panel     CBC      2. Hypercholesterolemia  E78.00 Lipid Panel      3. LAKIA (obstructive sleep apnea)  G47.33       4. Arthritis  M19.90       5. Leg cramping  R25.2 Magnesium      6. Vitamin D deficiency  E55.9 Vitamin D 25 Hydroxy      7. B12 deficiency  E53.8 Vitamin B12      8. DDD (degenerative disc disease), lumbar  M51.36       9. Chronic pain syndrome  G89.4 Compliance Drug Analysis, Urine          1. Primary hypertension  - Comprehensive Metabolic Panel; Future  - CBC; Future    2. Hypercholesterolemia  - Lipid Panel; Future    3. LAKIA (obstructive sleep apnea)    4. Arthritis    5. Leg cramping  - Magnesium; Future    6. Vitamin D deficiency  - Vitamin D 25 Hydroxy; Future    7. B12 deficiency  - Vitamin B12; Future    8. DDD (degenerative disc disease), lumbar    9. Chronic pain syndrome       Follow-up and Dispositions    Return in about 3 months (around 9/13/2024).           I have discussed the diagnosis with the patient and the intended plan as seen in the above orders.  Social history, medical history, and labs were reviewed.  The patient has received an after-visit summary and questions were answered concerning future plans.  I have discussed medication side effects and warnings with the patient as well.    Miah Grant MD  [facility]  06/13/24

## 2024-06-14 LAB
25(OH)D3 SERPL-MCNC: 62.8 NG/ML (ref 30–100)
ALBUMIN SERPL-MCNC: 3.8 G/DL (ref 3.5–5)
ALBUMIN/GLOB SERPL: 1.3 (ref 1.1–2.2)
ALP SERPL-CCNC: 63 U/L (ref 45–117)
ALT SERPL-CCNC: 21 U/L (ref 12–78)
ANION GAP SERPL CALC-SCNC: 3 MMOL/L (ref 5–15)
AST SERPL-CCNC: 18 U/L (ref 15–37)
BILIRUB SERPL-MCNC: 0.6 MG/DL (ref 0.2–1)
BUN SERPL-MCNC: 21 MG/DL (ref 6–20)
BUN/CREAT SERPL: 21 (ref 12–20)
CALCIUM SERPL-MCNC: 9.1 MG/DL (ref 8.5–10.1)
CHLORIDE SERPL-SCNC: 105 MMOL/L (ref 97–108)
CHOLEST SERPL-MCNC: 143 MG/DL
CO2 SERPL-SCNC: 28 MMOL/L (ref 21–32)
CREAT SERPL-MCNC: 1.01 MG/DL (ref 0.55–1.02)
ERYTHROCYTE [DISTWIDTH] IN BLOOD BY AUTOMATED COUNT: 13.5 % (ref 11.5–14.5)
GLOBULIN SER CALC-MCNC: 2.9 G/DL (ref 2–4)
GLUCOSE SERPL-MCNC: 95 MG/DL (ref 65–100)
HCT VFR BLD AUTO: 37.2 % (ref 35–47)
HDLC SERPL-MCNC: 80 MG/DL
HDLC SERPL: 1.8 (ref 0–5)
HGB BLD-MCNC: 11.5 G/DL (ref 11.5–16)
LDLC SERPL CALC-MCNC: 52 MG/DL (ref 0–100)
MAGNESIUM SERPL-MCNC: 2.1 MG/DL (ref 1.6–2.4)
MCH RBC QN AUTO: 29.9 PG (ref 26–34)
MCHC RBC AUTO-ENTMCNC: 30.9 G/DL (ref 30–36.5)
MCV RBC AUTO: 96.9 FL (ref 80–99)
NRBC # BLD: 0 K/UL (ref 0–0.01)
NRBC BLD-RTO: 0 PER 100 WBC
PLATELET # BLD AUTO: 230 K/UL (ref 150–400)
PMV BLD AUTO: 11.5 FL (ref 8.9–12.9)
POTASSIUM SERPL-SCNC: 4.8 MMOL/L (ref 3.5–5.1)
PROT SERPL-MCNC: 6.7 G/DL (ref 6.4–8.2)
RBC # BLD AUTO: 3.84 M/UL (ref 3.8–5.2)
SODIUM SERPL-SCNC: 136 MMOL/L (ref 136–145)
TRIGL SERPL-MCNC: 55 MG/DL
VIT B12 SERPL-MCNC: 162 PG/ML (ref 193–986)
VLDLC SERPL CALC-MCNC: 11 MG/DL
WBC # BLD AUTO: 5.7 K/UL (ref 3.6–11)

## 2024-06-19 LAB — DRUGS UR: NORMAL

## 2024-07-08 RX ORDER — LISINOPRIL 5 MG/1
TABLET ORAL
Qty: 90 TABLET | Refills: 1 | Status: SHIPPED | OUTPATIENT
Start: 2024-07-08

## 2024-08-18 DIAGNOSIS — M51.36 OTHER INTERVERTEBRAL DISC DEGENERATION, LUMBAR REGION: ICD-10-CM

## 2024-08-18 DIAGNOSIS — M79.2 NEURALGIA AND NEURITIS, UNSPECIFIED: ICD-10-CM

## 2024-08-19 RX ORDER — GABAPENTIN 300 MG/1
600 CAPSULE ORAL
Qty: 60 CAPSULE | Refills: 2 | Status: SHIPPED | OUTPATIENT
Start: 2024-08-19 | End: 2024-11-17

## 2024-08-26 ENCOUNTER — COMMUNITY OUTREACH (OUTPATIENT)
Facility: CLINIC | Age: 69
End: 2024-08-26

## 2024-08-26 NOTE — PROGRESS NOTES
Patient's HM shows they are overdue for Colonoscopy, Mammogram.   [employer] and  files searched without success.

## 2024-09-04 NOTE — PROGRESS NOTES
Chief complaint:   Chief Complaint   Patient presents with   •  Symptoms       Vitals:  Visit Vitals  /57 (BP Location: LUE - Left upper extremity, Patient Position: Sitting, Cuff Size: Regular)   Pulse 79   Temp 97.9 °F (36.6 °C) (Oral)   Resp 16   LMP 08/19/2013 (Approximate)   SpO2 95%       HISTORY OF PRESENT ILLNESS     Karin Bell is a 52 year old female presenting with complaints of dysuria and urinary frequency for 3 days. Patient denies fever, nausea, vomiting, diarrhea, chest pain, shortness of breath, weakness, fatigue or flank pain. Patient denies changes in bowel. History of UTI, this feels similar. Denies history of kidney stone or infection. Patient is able tolerate PO fluids. Patient denies any associated or modifying factors.  Patient has not taken any OTC medications for symptom control.  I have reviewed the patient's medical record in detail.     Other significant problems:  Patient Active Problem List    Diagnosis Date Noted   • Lumbar radicular pain 01/03/2023     Priority: Medium   • Bipolar 2 disorder  (CMD) 10/13/2022     Priority: Low   • Postlaminectomy syndrome of lumbar region 10/05/2020     Priority: Low   • Sciatica of left side 12/28/2019     Priority: Low   • Degeneration of lumbar or lumbosacral intervertebral disc 12/10/2019     Priority: Low   • Lumbosacral stenosis 12/10/2019     Priority: Low   • Lumbosacral neuritis 12/10/2019     Priority: Low   • Severe major depression  (CMD) 02/20/2019     Priority: Low   • Chronic back pain greater than 3 months duration 05/22/2018     Priority: Low   • Recurrent UTI 09/26/2016     Priority: Low   • Chronic anxiety 05/26/2016     Priority: Low   • Pelvic pain in female 11/12/2015     Priority: Low   • Chronic pelvic pain in female 08/14/2015     Priority: Low   • Fatigue 09/09/2013     Priority: Low   • Migraines 04/19/2012     Priority: Low       PAST MEDICAL, FAMILY AND SOCIAL HISTORY     Medications:  Current Outpatient  OCCUPATIONAL THERAPY EVALUATION/DISCHARGE  Patient: Korina Ingram (44 y.o. female)  Date: 7/24/2020  Primary Diagnosis: Primary osteoarthritis of left knee [M17.12]  Primary osteoarthritis of left knee [M17.12]  Procedure(s) (LRB):  LEFT TOTAL KNEE ARTHROPLASTY MAKOPLASTY (Left) 1 Day Post-Op   Precautions:   WBAT, Other (comment)(limit operative knee flexion to 90 degrees)    ASSESSMENT  Based on the objective data described below, the patient presents with primary complaint of new onset right sided sacral pain that goes down leg to lateral aspect of right ankle, RN aware and informed PTA. Patient without complaint of left knee pain and verbalizes it just feels tight. Patient with hx of toe walking on left foot and cues to prevent and facilitate heel down. On target for discharge and RN aware. Current Level of Function (ADLs/self-care): supervision to independent basic ADLs and mobility    Functional Outcome Measure: The patient scored 75/100 on the Barthel Index outcome measure   Other factors to consider for discharge: lives alone, son to assist prn, needs rolling walker     PLAN :  Recommendation for discharge: (in order for the patient to meet his/her long term goals)  No skilled occupational therapy/ follow up rehabilitation needs identified at this time.     This discharge recommendation:  Has been made in collaboration with the attending provider and/or case management    IF patient discharges home will need the following DME: none for OT       SUBJECTIVE:   Patient stated I'm waiting to see the     OBJECTIVE DATA SUMMARY:   HISTORY:   Past Medical History:   Diagnosis Date    Arthritis     Colon polyps 3/23/2010    Hypercholesterolemia     Sleep apnea 3/23/2010     Past Surgical History:   Procedure Laterality Date    ABDOMEN SURGERY PROC UNLISTED      ruptured spleen    HX ORTHOPAEDIC      multiple fxs       Prior Level of Function/Environment/Context: independent, lives alone, hx of toe Medications   Medication Sig Dispense Refill   • omeprazole (PriLOSEC) 40 MG capsule Take 1 capsule by mouth in the morning and 1 capsule in the evening. Take before meals. Please consider setting up an office visit due to persistent need for twice a day medication. 60 capsule 5   • sulfamethoxazole-trimethoprim (BACTRIM DS) 800-160 MG per tablet Take 1 tablet by mouth in the morning and 1 tablet in the evening. Do all this for 5 days. 10 tablet 0   • sertraline (ZOLOFT) 100 MG tablet Take 1 tablet by mouth daily. 90 tablet 0   • busPIRone (BUSPAR) 10 MG tablet Take 1 tablet by mouth in the morning and 1 tablet in the evening. 180 tablet 0   • buPROPion XL (WELLBUTRIN XL) 150 MG 24 hr tablet Take 1 tablet by mouth daily. 30 tablet 2   • zolpidem (AMBIEN) 5 MG tablet Take 1 tablet by mouth nightly as needed for Sleep. 30 tablet 1   • mometasone (NASONEX) 50 MCG/ACT nasal spray Spray 2 sprays in each nostril daily. 17 g 12   • Probiotic Product (FLORAJEN3 PO)      • estradiol (ESTRACE) 0.1 MG/GM vaginal cream Place 2 g vaginally 2 days a week. 42.5 g 5   • nitrofurantoin, macrocrystal-monohydrate, (MACROBID) 100 MG capsule Take 1 tab by mouth twice daily for 3 days max as needed for UTI symptoms post coital. (Patient not taking: Reported on 8/12/2024) 20 capsule 1   • sumatriptan (IMITREX) 100 MG tablet Take 1 tablet by mouth daily as needed for Migraine. Take 1 tablet by mouth at onset of migraine. May repeat after 2 hours if needed. (Patient not taking: Reported on 8/12/2024) 12 tablet 1   • atorvastatin (LIPITOR) 40 MG tablet Take 1 tablet by mouth daily. 90 tablet 2   • Multiple Vitamin tablet Take 1 tablet by mouth daily.     • Misc Natural Products (ESTROVEN ENERGY PO) Take 1 tablet by mouth daily.     • NON FORMULARY 10 mg daily. Equate antihistamine       No current facility-administered medications for this visit.       Allergies:  ALLERGIES:   Allergen Reactions   • Vicodin [Hydrocodone-Acetaminophen] Other  walking on left foot to prevent increased back pain per patient  Expanded or extensive additional review of patient history:     Home Situation  Home Environment: Private residence  # Steps to Enter: 1  Rails to Enter: No  One/Two Story Residence: One story  Living Alone: Yes  Support Systems: Family member(s)  Patient Expects to be Discharged to[de-identified] Private residence  Current DME Used/Available at Home: Cane, straight    EXAMINATION OF PERFORMANCE DEFICITS:  Cognitive/Behavioral Status:  Neurologic State: Alert  Orientation Level: Oriented X4  Cognition: Appropriate decision making; Appropriate for age attention/concentration; Appropriate safety awareness; Follows commands  Perception: Appears intact  Perseveration: No perseveration noted       Skin: dressing intact    Edema: minimal left LE    Hearing: Auditory  Auditory Impairment: None         Range of Motion:  AROM: Within functional limits        Strength:  Strength: Within functional limits        Coordination:     Fine Motor Skills-Upper: Left Intact; Right Intact    Gross Motor Skills-Upper: Left Intact; Right Intact    Tone & Sensation:  Tone: Normal  Sensation: Intact        Balance:  Sitting: Intact  Standing: Intact; With support    Functional Mobility and Transfers for ADLs:  Bed Mobility:  Supine to Sit: Independent  Sit to Supine: Independent  Scooting: Independent    Transfers:  Sit to Stand: Modified independent  Stand to Sit: Independent  Bed to Chair: Modified independent;Supervision  Bathroom Mobility: Modified independent  Toilet Transfer : Modified independent;Supervision; Adaptive equipment    ADL Assessment:  Feeding: Independent    Oral Facial Hygiene/Grooming: Supervision(instructed to lower left heel to increase weight bear)    Bathing: Supervision    Upper Body Dressing: Independent    Lower Body Dressing: Supervision    Toileting: Modified independent;Supervision        ADL Intervention and task modifications:           Instructed to increase (See Comments)     Urinary retention       Past Medical  History/Surgeries:  Past Medical History:   Diagnosis Date   • Anxiety    • Anxiety and depression    • Arthritis     Right foot   • Chronic female pelvic pain     Did not resolve with hysterectomy   • Depression    • Herniated nucleus pulposus, L5-S1 12/23/2019   • History of migraine     None since hysterectomy   • Hx of colonoscopy 09/28/2015    Single tubular adenoma. Recall colonoscopy in 5 years Dr Landers   • Ovarian cyst     bilateral   • Recurrent UTI     Controlled with prn Macrobid       Past Surgical History:   Procedure Laterality Date   • Cheilectomy Right 05/09/2014    1st metatarsal   • Colonoscopy diagnostic  03/16/2023    Dr. Pryor colonoscopy 5 years recall   • Esophagogastroduodenoscopy  09/28/2015    gastritis    • Lap, w/removal tube/ovary & adenexal struc  04/13/2016    Robotic BSO and TWYLA   • Lumbar discectomy Bilateral 12/27/2019    Bilateral L5/S1 hemilaminectomy, mesial facetectomy, foraminotomy and discectomy. Dr. Jd Valencia   • Oophorectomy Bilateral 2015    Hysterectomy 2013   • Ovarian cyst removal Left 09/03/2013   • Salpingectomy Bilateral 09/03/2013   • Tendon repair  1984    Left foot   • Total abdominal hysterectomy  09/03/2013       Family History:  Family History   Problem Relation Age of Onset   • Asthma Mother    • Psychiatric Mother         depression   • Hyperlipidemia Father    • High cholesterol Father    • Diabetes Paternal Grandfather    • Cancer, Breast Neg Hx         2024   • Cancer, Ovarian Neg Hx         2024   • Cancer, Endometrial Neg Hx         2024   • Cancer, Prostate Neg Hx         2024   • Cancer, Pancreatic Neg Hx         2024   • Cancer, Colon Neg Hx         2024       Social History:  Social History     Tobacco Use   • Smoking status: Never     Passive exposure: Never   • Smokeless tobacco: Never   Substance Use Topics   • Alcohol use: Yes     Alcohol/week: 7.0 standard drinks of alcohol      left heel to floor during ADL mobility and static standing due to learned habit prior to sx      Instructed on positioning in supine and seated to increase ability to perform knee flexion to 90 degrees and increase ability to perform sit to stand and stand to sit      Educated on safe footwear and fall prevention, patient with flip flops in hospital and given hx of toe walking on left and new onset of pain right LE, recommend shoes with heel support and rubber sole bottoms, patient verbalized understanding    Dressing joint: Patient instructed and demonstrated understanding to don/doff Left LE first/last with Modified independent. Patient instructed and demonstrated to don all clothing while sitting prior to standing, doff all clothing to knees while standing, then sit to doff clothing off from knees to feet in order to facilitate fall prevention, pain management, and energy conservation with Modified independent. Home safety: Patient instructed and indicated understanding on home modifications and safety (raise height of ADL objects, appropriate height of chair surfaces, recliner safety, change of floor surfaces, clear pathways) to increase independence and fall prevention. Standing: Patient instructed and demonstrated during ADLs to walk up to sink/counter top/surfaces, step into walker to increase safety of joint and fall prevention with Modified independent and Supervision. Patient educated about knee anatomy and educated to avoid rotation of Left LE. Instructed to apply concept to ADLs within the home (no twisting of knee during reaching across body, square off while using objects, slide objects along surfaces). Patient instructed and indicated understanding to increase amount of time standing, observe standing position during ADLs in order to increase even weight bearing through bilateral LEs in order to increase independence with ADLs.   Goal to be reached 30 days post - op, per orthopedic surgeon or per PT.      Functional Measure:  Barthel Index:    Bathin  Bladder: 10  Bowels: 10  Groomin  Dressing: 10  Feeding: 10  Mobility: 10  Stairs: 5  Toilet Use: 5  Transfer (Bed to Chair and Back): 10  Total: 75/100        The Barthel ADL Index: Guidelines  1. The index should be used as a record of what a patient does, not as a record of what a patient could do. 2. The main aim is to establish degree of independence from any help, physical or verbal, however minor and for whatever reason. 3. The need for supervision renders the patient not independent. 4. A patient's performance should be established using the best available evidence. Asking the patient, friends/relatives and nurses are the usual sources, but direct observation and common sense are also important. However direct testing is not needed. 5. Usually the patient's performance over the preceding 24-48 hours is important, but occasionally longer periods will be relevant. 6. Middle categories imply that the patient supplies over 50 per cent of the effort. 7. Use of aids to be independent is allowed. Berenice Perales., Barthel, D.W. (1657). Functional evaluation: the Barthel Index. [address] [facility] (14)2. [facility] PENNY Lee, Alissa Roman., Jose Manuel Nicholson.[facility], [address] [place] [address] (). Measuring the change indisability after inpatient rehabilitation; comparison of the responsiveness of the Barthel Index and Functional [place] Measure. Journal of Neurology, Neurosurgery, and Psychiatry, 66(4), 202-650. MARTINEZ Ramos.CHERRI.NAYELI, MARGARITA Hood, & Masoud Steele M.A. (2004.) Assessment of post-stroke quality of life in cost-effectiveness studies: The usefulness of the Barthel Index and the EuroQoL-5D.  Quality of Life Research, 15, 88-51         Occupational Therapy Evaluation Charge Determination   History Examination Decision-Making   LOW Complexity : Brief history review  LOW Complexity : 1-3 performance deficits relating to physical, cognitive , or Types: 7 Standard drinks or equivalent per week     Comment: occasionally       REVIEW OF SYSTEMS     Review of Systems   Genitourinary:  Positive for dysuria.       PHYSICAL EXAM     Physical Exam  Vitals and nursing note reviewed.   Constitutional:       Appearance: Normal appearance. She is normal weight.   Abdominal:      General: There is no distension.      Palpations: Abdomen is soft. There is no mass.      Tenderness: There is no abdominal tenderness. There is no right CVA tenderness, left CVA tenderness, guarding or rebound.      Hernia: No hernia is present.   Neurological:      Mental Status: She is alert and oriented to person, place, and time.   Psychiatric:         Mood and Affect: Mood normal.         Behavior: Behavior normal.         ASSESSMENT/PLAN     1. Acute cystitis without hematuria  - POCT Urine Dip Auto  - sulfamethoxazole-trimethoprim (BACTRIM DS) 800-160 MG per tablet; Take 1 tablet by mouth in the morning and 1 tablet in the evening. Do all this for 5 days.  Dispense: 10 tablet; Refill: 0  - Urine, Bacterial Culture     Patient presents with dysuria and frequency for 3 days. Patient is overall well appearing. Patient has no abdominal tenderness. No palpable masses or organomegaly. No CVA tenderness. No rigidity or guarding. UPOCT + nitrites, WBC's. In absence of fever, CVA tenderness and urinary colic; I have low suspicion for nephroliathsis versus pyelonephritis.  Will treat for acute cystitis with bactrim BID x 5 days; UC pending. Increase fluids and rest. Follow up with PCP. Any new or worsening symptoms, ER evaluation is needed.    Patient has stable vitals. Patient is not in acute distress. Patient is able to eat and drink normally. No further diagnostic testing would be beneficial at this point. Patient is currently okay for discharge home however patient is aware if there is any increase in symptoms ER evaluation is needed.     Preventative measures, supportive cares, and return  precautions for the above diagnoses were discussed with the patient.      If symptoms persist, worsen, new symptoms emerge, patient does not improve, or patient has any other concerns they were advised to please follow up in urgent care, emergency room or with PCP.  Discussed treatment plan with patient, who understands and agrees with plan. The risks, benefits, possible side effects, and drug interactions of medications ordered were reviewed with the patient.      psychosocial skils that result in activity limitations and / or participation restrictions  LOW Complexity : No comorbidities that affect functional and no verbal or physical assistance needed to complete eval tasks       Based on the above components, the patient evaluation is determined to be of the following complexity level: LOW   Pain Ratin/10 right LE sacrum to lateral aspect of right ankle, new onset since sx, no complaint of left LE pain, feels tight only    Activity Tolerance:   Good  Please refer to the flowsheet for vital signs taken during this treatment. After treatment patient left in no apparent distress:    Sitting in chair and Call bell within reach    COMMUNICATION/EDUCATION:   The patients plan of care was discussed with: Physical therapy assistant and Registered nurse.      Thank you for this referral.  Domingo Cervantes OTR/L  Time Calculation: 23 mins

## 2024-09-13 ENCOUNTER — OFFICE VISIT (OUTPATIENT)
Facility: CLINIC | Age: 69
End: 2024-09-13
Payer: MEDICARE

## 2024-09-13 VITALS
RESPIRATION RATE: 17 BRPM | WEIGHT: 139 LBS | HEIGHT: 61 IN | OXYGEN SATURATION: 97 % | TEMPERATURE: 98.4 F | BODY MASS INDEX: 26.24 KG/M2 | DIASTOLIC BLOOD PRESSURE: 58 MMHG | HEART RATE: 56 BPM | SYSTOLIC BLOOD PRESSURE: 139 MMHG

## 2024-09-13 DIAGNOSIS — E53.8 B12 DEFICIENCY: ICD-10-CM

## 2024-09-13 DIAGNOSIS — M51.36 OTHER INTERVERTEBRAL DISC DEGENERATION, LUMBAR REGION: ICD-10-CM

## 2024-09-13 DIAGNOSIS — E78.00 HYPERCHOLESTEROLEMIA: ICD-10-CM

## 2024-09-13 DIAGNOSIS — R25.2 LEG CRAMPING: ICD-10-CM

## 2024-09-13 DIAGNOSIS — M79.2 NEURALGIA AND NEURITIS, UNSPECIFIED: ICD-10-CM

## 2024-09-13 DIAGNOSIS — E55.9 VITAMIN D DEFICIENCY: ICD-10-CM

## 2024-09-13 DIAGNOSIS — Z23 ENCOUNTER FOR IMMUNIZATION: ICD-10-CM

## 2024-09-13 DIAGNOSIS — I10 PRIMARY HYPERTENSION: Primary | ICD-10-CM

## 2024-09-13 DIAGNOSIS — M51.36 DDD (DEGENERATIVE DISC DISEASE), LUMBAR: ICD-10-CM

## 2024-09-13 DIAGNOSIS — G89.4 CHRONIC PAIN SYNDROME: ICD-10-CM

## 2024-09-13 DIAGNOSIS — M19.90 ARTHRITIS: ICD-10-CM

## 2024-09-13 PROCEDURE — 99214 OFFICE O/P EST MOD 30 MIN: CPT | Performed by: FAMILY MEDICINE

## 2024-09-13 PROCEDURE — 90653 IIV ADJUVANT VACCINE IM: CPT | Performed by: FAMILY MEDICINE

## 2024-09-13 PROCEDURE — 1123F ACP DISCUSS/DSCN MKR DOCD: CPT | Performed by: FAMILY MEDICINE

## 2024-09-13 PROCEDURE — 3078F DIAST BP <80 MM HG: CPT | Performed by: FAMILY MEDICINE

## 2024-09-13 PROCEDURE — G0008 ADMIN INFLUENZA VIRUS VAC: HCPCS | Performed by: FAMILY MEDICINE

## 2024-09-13 PROCEDURE — 3077F SYST BP >= 140 MM HG: CPT | Performed by: FAMILY MEDICINE

## 2024-09-13 RX ORDER — GABAPENTIN 300 MG/1
600 CAPSULE ORAL
Qty: 60 CAPSULE | Refills: 2 | Status: SHIPPED | OUTPATIENT
Start: 2024-09-13 | End: 2024-12-12

## 2024-09-13 RX ORDER — METHOCARBAMOL 750 MG/1
750 TABLET, FILM COATED ORAL NIGHTLY
Qty: 90 TABLET | Refills: 1 | Status: SHIPPED | OUTPATIENT
Start: 2024-09-13

## 2024-09-13 SDOH — ECONOMIC STABILITY: FOOD INSECURITY: WITHIN THE PAST 12 MONTHS, YOU WORRIED THAT YOUR FOOD WOULD RUN OUT BEFORE YOU GOT MONEY TO BUY MORE.: NEVER TRUE

## 2024-09-13 SDOH — ECONOMIC STABILITY: FOOD INSECURITY: WITHIN THE PAST 12 MONTHS, THE FOOD YOU BOUGHT JUST DIDN'T LAST AND YOU DIDN'T HAVE MONEY TO GET MORE.: NEVER TRUE

## 2024-09-13 SDOH — ECONOMIC STABILITY: INCOME INSECURITY: HOW HARD IS IT FOR YOU TO PAY FOR THE VERY BASICS LIKE FOOD, HOUSING, MEDICAL CARE, AND HEATING?: NOT VERY HARD

## 2024-09-13 ASSESSMENT — ENCOUNTER SYMPTOMS
CHEST TIGHTNESS: 0
ABDOMINAL DISTENTION: 0
APNEA: 0
ABDOMINAL PAIN: 0

## 2024-09-14 LAB
25(OH)D3 SERPL-MCNC: 75.2 NG/ML (ref 30–100)
ALBUMIN SERPL-MCNC: 4 G/DL (ref 3.5–5)
ALBUMIN/GLOB SERPL: 1.4 (ref 1.1–2.2)
ALP SERPL-CCNC: 70 U/L (ref 45–117)
ALT SERPL-CCNC: 16 U/L (ref 12–78)
ANION GAP SERPL CALC-SCNC: 5 MMOL/L (ref 2–12)
AST SERPL-CCNC: 13 U/L (ref 15–37)
BILIRUB SERPL-MCNC: 0.5 MG/DL (ref 0.2–1)
BUN SERPL-MCNC: 18 MG/DL (ref 6–20)
BUN/CREAT SERPL: 18 (ref 12–20)
CALCIUM SERPL-MCNC: 9.3 MG/DL (ref 8.5–10.1)
CHLORIDE SERPL-SCNC: 104 MMOL/L (ref 97–108)
CHOLEST SERPL-MCNC: 139 MG/DL
CO2 SERPL-SCNC: 29 MMOL/L (ref 21–32)
CREAT SERPL-MCNC: 0.98 MG/DL (ref 0.55–1.02)
ERYTHROCYTE [DISTWIDTH] IN BLOOD BY AUTOMATED COUNT: 13.2 % (ref 11.5–14.5)
GLOBULIN SER CALC-MCNC: 2.8 G/DL (ref 2–4)
GLUCOSE SERPL-MCNC: 85 MG/DL (ref 65–100)
HCT VFR BLD AUTO: 37 % (ref 35–47)
HDLC SERPL-MCNC: 80 MG/DL
HDLC SERPL: 1.7 (ref 0–5)
HGB BLD-MCNC: 11.6 G/DL (ref 11.5–16)
LDLC SERPL CALC-MCNC: 49.6 MG/DL (ref 0–100)
MCH RBC QN AUTO: 30.1 PG (ref 26–34)
MCHC RBC AUTO-ENTMCNC: 31.4 G/DL (ref 30–36.5)
MCV RBC AUTO: 95.9 FL (ref 80–99)
NRBC # BLD: 0 K/UL (ref 0–0.01)
NRBC BLD-RTO: 0 PER 100 WBC
PLATELET # BLD AUTO: 245 K/UL (ref 150–400)
PMV BLD AUTO: 11.2 FL (ref 8.9–12.9)
POTASSIUM SERPL-SCNC: 4.8 MMOL/L (ref 3.5–5.1)
PROT SERPL-MCNC: 6.8 G/DL (ref 6.4–8.2)
RBC # BLD AUTO: 3.86 M/UL (ref 3.8–5.2)
SODIUM SERPL-SCNC: 138 MMOL/L (ref 136–145)
TRIGL SERPL-MCNC: 47 MG/DL
VIT B12 SERPL-MCNC: 752 PG/ML (ref 193–986)
VLDLC SERPL CALC-MCNC: 9.4 MG/DL
WBC # BLD AUTO: 5.6 K/UL (ref 3.6–11)

## 2024-09-30 RX ORDER — TRAZODONE HYDROCHLORIDE 100 MG/1
100 TABLET ORAL
Qty: 90 TABLET | Refills: 1 | Status: SHIPPED | OUTPATIENT
Start: 2024-09-30

## 2024-10-11 RX ORDER — ALENDRONATE SODIUM 70 MG/1
TABLET ORAL
Qty: 12 TABLET | Refills: 1 | Status: SHIPPED | OUTPATIENT
Start: 2024-10-11

## 2024-10-17 RX ORDER — ROSUVASTATIN CALCIUM 10 MG/1
TABLET, COATED ORAL
Qty: 90 TABLET | Refills: 1 | Status: SHIPPED | OUTPATIENT
Start: 2024-10-17

## 2024-11-28 DIAGNOSIS — G89.4 CHRONIC PAIN SYNDROME: ICD-10-CM

## 2024-11-28 DIAGNOSIS — M19.90 ARTHRITIS: ICD-10-CM

## 2024-11-28 DIAGNOSIS — M51.369 DDD (DEGENERATIVE DISC DISEASE), LUMBAR: ICD-10-CM

## 2024-11-28 DIAGNOSIS — M79.2 NEURALGIA AND NEURITIS, UNSPECIFIED: ICD-10-CM

## 2024-12-01 RX ORDER — GABAPENTIN 300 MG/1
CAPSULE ORAL
Qty: 60 CAPSULE | Refills: 2 | Status: SHIPPED | OUTPATIENT
Start: 2024-12-01 | End: 2025-03-01

## 2024-12-16 ENCOUNTER — OFFICE VISIT (OUTPATIENT)
Facility: CLINIC | Age: 69
End: 2024-12-16
Payer: MEDICARE

## 2024-12-16 VITALS
DIASTOLIC BLOOD PRESSURE: 56 MMHG | SYSTOLIC BLOOD PRESSURE: 133 MMHG | WEIGHT: 142.2 LBS | HEIGHT: 61 IN | BODY MASS INDEX: 26.85 KG/M2 | HEART RATE: 59 BPM | OXYGEN SATURATION: 97 % | RESPIRATION RATE: 18 BRPM | TEMPERATURE: 97.4 F

## 2024-12-16 DIAGNOSIS — M19.90 ARTHRITIS: ICD-10-CM

## 2024-12-16 DIAGNOSIS — E78.00 HYPERCHOLESTEROLEMIA: ICD-10-CM

## 2024-12-16 DIAGNOSIS — M51.360 DEGENERATION OF INTERVERTEBRAL DISC OF LUMBAR REGION WITH DISCOGENIC BACK PAIN: ICD-10-CM

## 2024-12-16 DIAGNOSIS — I10 PRIMARY HYPERTENSION: Primary | ICD-10-CM

## 2024-12-16 DIAGNOSIS — M81.0 AGE-RELATED OSTEOPOROSIS WITHOUT CURRENT PATHOLOGICAL FRACTURE: ICD-10-CM

## 2024-12-16 DIAGNOSIS — G47.33 OSA (OBSTRUCTIVE SLEEP APNEA): ICD-10-CM

## 2024-12-16 PROCEDURE — 1159F MED LIST DOCD IN RCRD: CPT | Performed by: FAMILY MEDICINE

## 2024-12-16 PROCEDURE — 99214 OFFICE O/P EST MOD 30 MIN: CPT | Performed by: FAMILY MEDICINE

## 2024-12-16 PROCEDURE — 3078F DIAST BP <80 MM HG: CPT | Performed by: FAMILY MEDICINE

## 2024-12-16 PROCEDURE — 3075F SYST BP GE 130 - 139MM HG: CPT | Performed by: FAMILY MEDICINE

## 2024-12-16 PROCEDURE — 1123F ACP DISCUSS/DSCN MKR DOCD: CPT | Performed by: FAMILY MEDICINE

## 2024-12-16 PROCEDURE — 1160F RVW MEDS BY RX/DR IN RCRD: CPT | Performed by: FAMILY MEDICINE

## 2024-12-16 ASSESSMENT — ENCOUNTER SYMPTOMS
ABDOMINAL PAIN: 0
CHEST TIGHTNESS: 0
BACK PAIN: 0
APNEA: 0
ABDOMINAL DISTENTION: 0

## 2024-12-16 NOTE — PROGRESS NOTES
\"Have you been to the ER, urgent care clinic since your last visit?  Hospitalized since your last visit?\"    NO    “Have you seen or consulted any other health care providers outside of [facility] since your last visit?”    NO            Click Here for Release of Records Request   
Palpations: Abdomen is soft.   Musculoskeletal:         General: Normal range of motion.      Cervical back: Normal range of motion and neck supple.      Right lower leg: No edema.      Left lower leg: No edema.   Skin:     General: Skin is warm and dry.   Neurological:      General: No focal deficit present.      Mental Status: She is alert. Mental status is at baseline.   Psychiatric:         Mood and Affect: Mood normal.          Pertinent Labs/Studies:      Assessment and orders:       ICD-10-CM    1. Primary hypertension  I10 Comprehensive Metabolic Panel     CBC      2. LAKIA (obstructive sleep apnea)  G47.33       3. Degeneration of intervertebral disc of lumbar region with discogenic back pain  M51.360       4. Arthritis  M19.90       5. Hypercholesterolemia  E78.00 Lipid Panel      6. Age-related osteoporosis without current pathological fracture  M81.0           1. Primary hypertension  The patient is aware of our goal to reduce or eliminate the long term problems (such as strokes and heart attacks) related to poorly controlled Triglycerides, LDL, Cholesterol.   - Comprehensive Metabolic Panel; Future  - CBC; Future    2. LAKIA (obstructive sleep apnea)  Not on C-pap, sleeps on side primarily    3. Degeneration of intervertebral disc of lumbar region with discogenic back pain  Overall controlled, changes with weather    4. Arthritis    5. Hypercholesterolemia  The patient is aware of our goal to reduce or eliminate the long term problems (such as strokes and heart attacks) related to poorly controlled Triglycerides, LDL, Cholesterol.   - Lipid Panel; Future       Follow-up and Dispositions    Return in about 3 months (around 3/16/2025).           I have discussed the diagnosis with the patient and the intended plan as seen in the above orders.  Social history, medical history, and labs were reviewed.  The patient has received an after-visit summary and questions were answered concerning future plans.  I have

## 2024-12-17 LAB
ALBUMIN SERPL-MCNC: 4.1 G/DL (ref 3.5–5)
ALBUMIN/GLOB SERPL: 1.4 (ref 1.1–2.2)
ALP SERPL-CCNC: 79 U/L (ref 45–117)
ALT SERPL-CCNC: 18 U/L (ref 12–78)
ANION GAP SERPL CALC-SCNC: 5 MMOL/L (ref 2–12)
AST SERPL-CCNC: 18 U/L (ref 15–37)
BILIRUB SERPL-MCNC: 0.6 MG/DL (ref 0.2–1)
BUN SERPL-MCNC: 22 MG/DL (ref 6–20)
BUN/CREAT SERPL: 26 (ref 12–20)
CALCIUM SERPL-MCNC: 9.3 MG/DL (ref 8.5–10.1)
CHLORIDE SERPL-SCNC: 105 MMOL/L (ref 97–108)
CHOLEST SERPL-MCNC: 145 MG/DL
CO2 SERPL-SCNC: 26 MMOL/L (ref 21–32)
CREAT SERPL-MCNC: 0.85 MG/DL (ref 0.55–1.02)
ERYTHROCYTE [DISTWIDTH] IN BLOOD BY AUTOMATED COUNT: 12.8 % (ref 11.5–14.5)
GLOBULIN SER CALC-MCNC: 2.9 G/DL (ref 2–4)
GLUCOSE SERPL-MCNC: 88 MG/DL (ref 65–100)
HCT VFR BLD AUTO: 39.2 % (ref 35–47)
HDLC SERPL-MCNC: 77 MG/DL
HDLC SERPL: 1.9 (ref 0–5)
HGB BLD-MCNC: 12.4 G/DL (ref 11.5–16)
LDLC SERPL CALC-MCNC: 55.6 MG/DL (ref 0–100)
MCH RBC QN AUTO: 29.8 PG (ref 26–34)
MCHC RBC AUTO-ENTMCNC: 31.6 G/DL (ref 30–36.5)
MCV RBC AUTO: 94.2 FL (ref 80–99)
NRBC # BLD: 0 K/UL (ref 0–0.01)
NRBC BLD-RTO: 0 PER 100 WBC
PLATELET # BLD AUTO: 263 K/UL (ref 150–400)
PMV BLD AUTO: 11.1 FL (ref 8.9–12.9)
POTASSIUM SERPL-SCNC: 4.6 MMOL/L (ref 3.5–5.1)
PROT SERPL-MCNC: 7 G/DL (ref 6.4–8.2)
RBC # BLD AUTO: 4.16 M/UL (ref 3.8–5.2)
SODIUM SERPL-SCNC: 136 MMOL/L (ref 136–145)
TRIGL SERPL-MCNC: 62 MG/DL
VLDLC SERPL CALC-MCNC: 12.4 MG/DL
WBC # BLD AUTO: 6.5 K/UL (ref 3.6–11)

## 2025-01-23 RX ORDER — LISINOPRIL 5 MG/1
TABLET ORAL
Qty: 90 TABLET | Refills: 1 | Status: SHIPPED | OUTPATIENT
Start: 2025-01-23

## 2025-02-21 ENCOUNTER — TELEPHONE (OUTPATIENT)
Facility: CLINIC | Age: 70
End: 2025-02-21

## 2025-02-21 DIAGNOSIS — J22 LRTI (LOWER RESPIRATORY TRACT INFECTION): Primary | ICD-10-CM

## 2025-02-21 RX ORDER — PREDNISONE 10 MG/1
10 TABLET ORAL DAILY
Qty: 7 TABLET | Refills: 0 | Status: SHIPPED | OUTPATIENT
Start: 2025-02-21 | End: 2025-02-28

## 2025-02-25 ENCOUNTER — OFFICE VISIT (OUTPATIENT)
Facility: CLINIC | Age: 70
End: 2025-02-25
Payer: MEDICARE

## 2025-02-25 VITALS
HEART RATE: 70 BPM | DIASTOLIC BLOOD PRESSURE: 58 MMHG | HEIGHT: 61 IN | SYSTOLIC BLOOD PRESSURE: 123 MMHG | RESPIRATION RATE: 16 BRPM | BODY MASS INDEX: 27.38 KG/M2 | OXYGEN SATURATION: 98 % | WEIGHT: 145 LBS | TEMPERATURE: 98.2 F

## 2025-02-25 DIAGNOSIS — R07.81 RIB PAIN: ICD-10-CM

## 2025-02-25 DIAGNOSIS — R05.1 ACUTE COUGH: Primary | ICD-10-CM

## 2025-02-25 PROCEDURE — 1159F MED LIST DOCD IN RCRD: CPT | Performed by: FAMILY MEDICINE

## 2025-02-25 PROCEDURE — 1123F ACP DISCUSS/DSCN MKR DOCD: CPT | Performed by: FAMILY MEDICINE

## 2025-02-25 PROCEDURE — 99213 OFFICE O/P EST LOW 20 MIN: CPT | Performed by: FAMILY MEDICINE

## 2025-02-25 PROCEDURE — 1160F RVW MEDS BY RX/DR IN RCRD: CPT | Performed by: FAMILY MEDICINE

## 2025-02-25 PROCEDURE — 3078F DIAST BP <80 MM HG: CPT | Performed by: FAMILY MEDICINE

## 2025-02-25 PROCEDURE — 3074F SYST BP LT 130 MM HG: CPT | Performed by: FAMILY MEDICINE

## 2025-02-25 SDOH — ECONOMIC STABILITY: FOOD INSECURITY: WITHIN THE PAST 12 MONTHS, YOU WORRIED THAT YOUR FOOD WOULD RUN OUT BEFORE YOU GOT MONEY TO BUY MORE.: NEVER TRUE

## 2025-02-25 SDOH — ECONOMIC STABILITY: TRANSPORTATION INSECURITY
IN THE PAST 12 MONTHS, HAS THE LACK OF TRANSPORTATION KEPT YOU FROM MEDICAL APPOINTMENTS OR FROM GETTING MEDICATIONS?: NO

## 2025-02-25 SDOH — ECONOMIC STABILITY: INCOME INSECURITY: IN THE LAST 12 MONTHS, WAS THERE A TIME WHEN YOU WERE NOT ABLE TO PAY THE MORTGAGE OR RENT ON TIME?: NO

## 2025-02-25 SDOH — ECONOMIC STABILITY: FOOD INSECURITY: WITHIN THE PAST 12 MONTHS, THE FOOD YOU BOUGHT JUST DIDN'T LAST AND YOU DIDN'T HAVE MONEY TO GET MORE.: NEVER TRUE

## 2025-02-25 SDOH — ECONOMIC STABILITY: TRANSPORTATION INSECURITY
IN THE PAST 12 MONTHS, HAS LACK OF TRANSPORTATION KEPT YOU FROM MEETINGS, WORK, OR FROM GETTING THINGS NEEDED FOR DAILY LIVING?: NO

## 2025-02-25 ASSESSMENT — PATIENT HEALTH QUESTIONNAIRE - PHQ9
SUM OF ALL RESPONSES TO PHQ QUESTIONS 1-9: 0
1. LITTLE INTEREST OR PLEASURE IN DOING THINGS: NOT AT ALL
2. FEELING DOWN, DEPRESSED OR HOPELESS: NOT AT ALL
SUM OF ALL RESPONSES TO PHQ9 QUESTIONS 1 & 2: 0
SUM OF ALL RESPONSES TO PHQ QUESTIONS 1-9: 0

## 2025-02-25 ASSESSMENT — ENCOUNTER SYMPTOMS
COUGH: 1
WHEEZING: 0
SHORTNESS OF BREATH: 0

## 2025-02-25 NOTE — PATIENT INSTRUCTIONS
Advised otc tylenol arthritis 2 tabs every 12 hr, topical diclofenac, lidocaine patch 12 hr on and 12 hr off.

## 2025-02-25 NOTE — PROGRESS NOTES
Chief Complaint   Patient presents with    Cough     Left posterior back pain last Wednesday felt pain, cough since the Sunday         \"Have you been to the ER, urgent care clinic since your last visit?  Hospitalized since your last visit?\"    NO    “Have you seen or consulted any other health care providers outside of [facility] since your last visit?”    NO            Click Here for Release of Records Request     Health Maintenance Due   Topic Date Due    Lung Cancer Screening &/or Counseling  Never done    COVID-19 Vaccine (1 - 2024-25 season) Never done    Annual Wellness Visit (Medicare Advantage)  01/01/2025       
side effects in detail, and radiology results and schedule of future radiology studies reviewed with patient  Justification for level of billing, time spent: 25 min with patient, reviewing chart and face to face exam, clinical documentation. Time prior to the visit was spent reviewing external notes results and imaging reports.  As well during the visit, time included evaluating the patient, discussing results and plans with the patient, and coordinating care.  As well, after the visit additional time spent documenting clinical care, interpreting results, and coordinating care.  This time was all spent during the date of service.    Return if symptoms worsen or fail to improve.   I have discussed the diagnosis with the patient and the intended plan as seen in the above orders.  Social history, medical history, and labs were reviewed.  The patient has received an after-visit summary and questions were answered concerning future plans.  I have discussed medication side effects and warnings with the patient as well. Patient verbalized understanding and accepts plan & risks.        Allegra Harrison MD  [facility]  02/25/25

## 2025-02-26 DIAGNOSIS — M51.369 DDD (DEGENERATIVE DISC DISEASE), LUMBAR: ICD-10-CM

## 2025-02-26 DIAGNOSIS — M79.2 NEURALGIA AND NEURITIS, UNSPECIFIED: ICD-10-CM

## 2025-02-26 DIAGNOSIS — G89.4 CHRONIC PAIN SYNDROME: ICD-10-CM

## 2025-02-26 DIAGNOSIS — M19.90 ARTHRITIS: ICD-10-CM

## 2025-02-27 RX ORDER — GABAPENTIN 300 MG/1
CAPSULE ORAL
Qty: 60 CAPSULE | Refills: 2 | Status: SHIPPED | OUTPATIENT
Start: 2025-02-27 | End: 2025-05-28

## 2025-03-18 ENCOUNTER — OFFICE VISIT (OUTPATIENT)
Facility: CLINIC | Age: 70
End: 2025-03-18
Payer: MEDICARE

## 2025-03-18 VITALS
RESPIRATION RATE: 18 BRPM | WEIGHT: 147.4 LBS | HEART RATE: 57 BPM | HEIGHT: 61 IN | DIASTOLIC BLOOD PRESSURE: 59 MMHG | TEMPERATURE: 97.2 F | OXYGEN SATURATION: 98 % | SYSTOLIC BLOOD PRESSURE: 124 MMHG | BODY MASS INDEX: 27.83 KG/M2

## 2025-03-18 DIAGNOSIS — Z00.00 MEDICARE ANNUAL WELLNESS VISIT, SUBSEQUENT: Primary | ICD-10-CM

## 2025-03-18 DIAGNOSIS — M51.360 DEGENERATION OF INTERVERTEBRAL DISC OF LUMBAR REGION WITH DISCOGENIC BACK PAIN: ICD-10-CM

## 2025-03-18 DIAGNOSIS — I10 PRIMARY HYPERTENSION: ICD-10-CM

## 2025-03-18 DIAGNOSIS — E78.00 HYPERCHOLESTEROLEMIA: ICD-10-CM

## 2025-03-18 DIAGNOSIS — M19.90 ARTHRITIS: ICD-10-CM

## 2025-03-18 DIAGNOSIS — M81.0 AGE-RELATED OSTEOPOROSIS WITHOUT CURRENT PATHOLOGICAL FRACTURE: ICD-10-CM

## 2025-03-18 DIAGNOSIS — E55.9 VITAMIN D DEFICIENCY: ICD-10-CM

## 2025-03-18 DIAGNOSIS — G89.4 CHRONIC PAIN SYNDROME: ICD-10-CM

## 2025-03-18 DIAGNOSIS — E53.8 B12 DEFICIENCY: ICD-10-CM

## 2025-03-18 DIAGNOSIS — G47.33 OSA (OBSTRUCTIVE SLEEP APNEA): ICD-10-CM

## 2025-03-18 PROCEDURE — G2211 COMPLEX E/M VISIT ADD ON: HCPCS | Performed by: FAMILY MEDICINE

## 2025-03-18 PROCEDURE — 1123F ACP DISCUSS/DSCN MKR DOCD: CPT | Performed by: FAMILY MEDICINE

## 2025-03-18 PROCEDURE — G0439 PPPS, SUBSEQ VISIT: HCPCS | Performed by: FAMILY MEDICINE

## 2025-03-18 PROCEDURE — 3074F SYST BP LT 130 MM HG: CPT | Performed by: FAMILY MEDICINE

## 2025-03-18 PROCEDURE — 1159F MED LIST DOCD IN RCRD: CPT | Performed by: FAMILY MEDICINE

## 2025-03-18 PROCEDURE — 3078F DIAST BP <80 MM HG: CPT | Performed by: FAMILY MEDICINE

## 2025-03-18 PROCEDURE — 1160F RVW MEDS BY RX/DR IN RCRD: CPT | Performed by: FAMILY MEDICINE

## 2025-03-18 PROCEDURE — 99214 OFFICE O/P EST MOD 30 MIN: CPT | Performed by: FAMILY MEDICINE

## 2025-03-18 RX ORDER — ROSUVASTATIN CALCIUM 10 MG/1
10 TABLET, COATED ORAL NIGHTLY
Qty: 90 TABLET | Refills: 1 | Status: SHIPPED | OUTPATIENT
Start: 2025-03-18

## 2025-03-18 RX ORDER — ALENDRONATE SODIUM 70 MG/1
70 TABLET ORAL
Qty: 12 TABLET | Refills: 1 | Status: SHIPPED | OUTPATIENT
Start: 2025-03-18

## 2025-03-18 ASSESSMENT — ENCOUNTER SYMPTOMS
ABDOMINAL PAIN: 0
BACK PAIN: 1
CHEST TIGHTNESS: 0
ABDOMINAL DISTENTION: 0
APNEA: 0

## 2025-03-18 ASSESSMENT — LIFESTYLE VARIABLES
HOW MANY STANDARD DRINKS CONTAINING ALCOHOL DO YOU HAVE ON A TYPICAL DAY: 1 OR 2
HOW OFTEN DO YOU HAVE A DRINK CONTAINING ALCOHOL: MONTHLY OR LESS

## 2025-03-18 ASSESSMENT — PATIENT HEALTH QUESTIONNAIRE - PHQ9
SUM OF ALL RESPONSES TO PHQ QUESTIONS 1-9: 0
1. LITTLE INTEREST OR PLEASURE IN DOING THINGS: NOT AT ALL
SUM OF ALL RESPONSES TO PHQ QUESTIONS 1-9: 0
2. FEELING DOWN, DEPRESSED OR HOPELESS: NOT AT ALL
SUM OF ALL RESPONSES TO PHQ QUESTIONS 1-9: 0
SUM OF ALL RESPONSES TO PHQ QUESTIONS 1-9: 0

## 2025-03-18 NOTE — PATIENT INSTRUCTIONS
doctor if you think you are having a problem with your medicine.     If your doctor recommends aspirin, take the amount directed each day. Make sure you take aspirin and not another kind of pain reliever, such as acetaminophen (Tylenol).   When should you call for help?   Call 911 if you have symptoms of a heart attack. These may include:    Chest pain or pressure, or a strange feeling in the chest.     Sweating.     Shortness of breath.     Pain, pressure, or a strange feeling in the back, neck, jaw, or upper belly or in one or both shoulders or arms.     Lightheadedness or sudden weakness.     A fast or irregular heartbeat.   After you call 911, the  may tell you to chew 1 adult-strength or 2 to 4 low-dose aspirin. Wait for an ambulance. Do not try to drive yourself.  Watch closely for changes in your health, and be sure to contact your doctor if you have any problems.  Where can you learn more?  Go to https://www.[employer].net/patientEd and enter F075 to learn more about \"A Healthy Heart: Care Instructions.\"  Current as of: July 31, 2024  Content Version: 14.4  © 8792-5679 [employer].   Care instructions adapted under license by [employer]. If you have questions about a medical condition or this instruction, always ask your healthcare professional. [employer], disclaims any warranty or liability for your use of this information.    Personalized Preventive Plan for Sonia Porter - 3/18/2025  Medicare offers a range of preventive health benefits. Some of the tests and screenings are paid in full while other may be subject to a deductible, co-insurance, and/or copay.  Some of these benefits include a comprehensive review of your medical history including lifestyle, illnesses that may run in your family, and various assessments and screenings as appropriate.  After reviewing your medical record and screening and assessments performed today your provider may have ordered

## 2025-03-18 NOTE — PROGRESS NOTES
Medicare Annual Wellness Visit    Sonia Porter is here for Medicare AWV    Assessment & Plan   Medicare annual wellness visit, subsequent  Primary hypertension  LAKIA (obstructive sleep apnea)  Degeneration of intervertebral disc of lumbar region with discogenic back pain  Arthritis  Hypercholesterolemia  -     rosuvastatin (CRESTOR) 10 MG tablet; Take 1 tablet by mouth nightly, Disp-90 tablet, R-1This prescription was filled on 7/29/2024. Any refills authorized will be placed on file.Normal  Vitamin D deficiency  B12 deficiency  Chronic pain syndrome  Age-related osteoporosis without current pathological fracture  -     alendronate (FOSAMAX) 70 MG tablet; Take 1 tablet by mouth every 7 days, Disp-12 tablet, R-1This prescription was filled on 7/29/2024. Any refills authorized will be placed on file.Normal       Return in about 3 months (around 6/18/2025).     Subjective     Patient's complete Health Risk Assessment and screening values have been reviewed and are found in Flowsheets. The following problems were reviewed today and where indicated follow up appointments were made and/or referrals ordered.    Positive Risk Factor Screenings with Interventions:                   Vision Screen:  Do you have difficulty driving, watching TV, or doing any of your daily activities because of your eyesight?: (!) Yes  Have you had an eye exam within the past year?: (!) No  Interventions:   Patient encouraged to make appointment with their eye specialist        Lung Cancer Screening:  Guidelines regarding LDCT screening for lung cancer reviewed. Patient declined screening.        Objective   Vitals:    03/18/25 0915   BP: (!) 124/59   BP Site: Right Upper Arm   Patient Position: Sitting   BP Cuff Size: Medium Adult   Pulse: 57   Resp: 18   Temp: 97.2 °F (36.2 °C)   TempSrc: Temporal   SpO2: 98%   Weight: 66.9 kg (147 lb 6.4 oz)   Height: 1.549 m (5' 1\")      Body mass index is 27.85 kg/m².                   Allergies   Allergen 
\"Have you been to the ER, urgent care clinic since your last visit?  Hospitalized since your last visit?\"    NO    “Have you seen or consulted any other health care providers outside of [facility] since your last visit?”    NO            Click Here for Release of Records Request   
pathological fracture  - alendronate (FOSAMAX) 70 MG tablet; Take 1 tablet by mouth every 7 days  Dispense: 12 tablet; Refill: 1       Follow-up and Dispositions    Return in about 3 months (around 6/18/2025).           I have discussed the diagnosis with the patient and the intended plan as seen in the above orders.  Social history, medical history, and labs were reviewed.  The patient has received an after-visit summary and questions were answered concerning future plans.  I have discussed medication side effects and warnings with the patient as well.    Miah Grant MD  [facility]  03/18/25

## 2025-05-29 DIAGNOSIS — M79.2 NEURALGIA AND NEURITIS, UNSPECIFIED: ICD-10-CM

## 2025-05-29 DIAGNOSIS — G89.4 CHRONIC PAIN SYNDROME: ICD-10-CM

## 2025-05-29 DIAGNOSIS — M19.90 ARTHRITIS: ICD-10-CM

## 2025-05-29 DIAGNOSIS — M51.369 DDD (DEGENERATIVE DISC DISEASE), LUMBAR: ICD-10-CM

## 2025-05-29 RX ORDER — GABAPENTIN 300 MG/1
CAPSULE ORAL
Qty: 60 CAPSULE | Refills: 0 | Status: SHIPPED | OUTPATIENT
Start: 2025-05-29 | End: 2025-06-28

## 2025-06-16 ENCOUNTER — PATIENT MESSAGE (OUTPATIENT)
Facility: CLINIC | Age: 70
End: 2025-06-16

## 2025-06-16 DIAGNOSIS — G89.4 CHRONIC PAIN SYNDROME: ICD-10-CM

## 2025-06-16 DIAGNOSIS — M51.360 DEGENERATION OF INTERVERTEBRAL DISC OF LUMBAR REGION WITH DISCOGENIC BACK PAIN: Primary | ICD-10-CM

## 2025-06-17 RX ORDER — GABAPENTIN 100 MG/1
100 CAPSULE ORAL
Qty: 30 CAPSULE | Refills: 0 | Status: SHIPPED | OUTPATIENT
Start: 2025-06-17 | End: 2025-07-17

## 2025-07-22 RX ORDER — LISINOPRIL 5 MG/1
5 TABLET ORAL DAILY
Qty: 90 TABLET | Refills: 1 | Status: SHIPPED | OUTPATIENT
Start: 2025-07-22

## 2025-07-29 ENCOUNTER — PATIENT MESSAGE (OUTPATIENT)
Facility: CLINIC | Age: 70
End: 2025-07-29

## 2025-07-29 DIAGNOSIS — R11.2 NAUSEA AND VOMITING, UNSPECIFIED VOMITING TYPE: Primary | ICD-10-CM

## 2025-07-30 RX ORDER — ONDANSETRON 4 MG/1
4 TABLET, FILM COATED ORAL DAILY PRN
Qty: 30 TABLET | Refills: 0 | Status: SHIPPED | OUTPATIENT
Start: 2025-07-30

## 2025-08-04 ENCOUNTER — OFFICE VISIT (OUTPATIENT)
Facility: CLINIC | Age: 70
End: 2025-08-04
Payer: MEDICARE

## 2025-08-04 VITALS
DIASTOLIC BLOOD PRESSURE: 66 MMHG | BODY MASS INDEX: 27.08 KG/M2 | OXYGEN SATURATION: 97 % | TEMPERATURE: 97.6 F | RESPIRATION RATE: 18 BRPM | HEART RATE: 79 BPM | SYSTOLIC BLOOD PRESSURE: 123 MMHG | WEIGHT: 143.4 LBS | HEIGHT: 61 IN

## 2025-08-04 DIAGNOSIS — R11.2 NAUSEA AND VOMITING, UNSPECIFIED VOMITING TYPE: ICD-10-CM

## 2025-08-04 DIAGNOSIS — E78.00 HYPERCHOLESTEROLEMIA: ICD-10-CM

## 2025-08-04 DIAGNOSIS — E55.9 VITAMIN D DEFICIENCY: ICD-10-CM

## 2025-08-04 DIAGNOSIS — M19.90 ARTHRITIS: ICD-10-CM

## 2025-08-04 DIAGNOSIS — E53.8 B12 DEFICIENCY: ICD-10-CM

## 2025-08-04 DIAGNOSIS — I10 PRIMARY HYPERTENSION: Primary | ICD-10-CM

## 2025-08-04 DIAGNOSIS — M51.360 DEGENERATION OF INTERVERTEBRAL DISC OF LUMBAR REGION WITH DISCOGENIC BACK PAIN: ICD-10-CM

## 2025-08-04 DIAGNOSIS — G47.33 OSA (OBSTRUCTIVE SLEEP APNEA): ICD-10-CM

## 2025-08-04 PROCEDURE — 3078F DIAST BP <80 MM HG: CPT | Performed by: FAMILY MEDICINE

## 2025-08-04 PROCEDURE — 1160F RVW MEDS BY RX/DR IN RCRD: CPT | Performed by: FAMILY MEDICINE

## 2025-08-04 PROCEDURE — 3074F SYST BP LT 130 MM HG: CPT | Performed by: FAMILY MEDICINE

## 2025-08-04 PROCEDURE — G2211 COMPLEX E/M VISIT ADD ON: HCPCS | Performed by: FAMILY MEDICINE

## 2025-08-04 PROCEDURE — 1123F ACP DISCUSS/DSCN MKR DOCD: CPT | Performed by: FAMILY MEDICINE

## 2025-08-04 PROCEDURE — 99214 OFFICE O/P EST MOD 30 MIN: CPT | Performed by: FAMILY MEDICINE

## 2025-08-04 PROCEDURE — 1159F MED LIST DOCD IN RCRD: CPT | Performed by: FAMILY MEDICINE

## 2025-08-04 RX ORDER — DIPHENHYDRAMINE HCL 25 MG
25 TABLET ORAL EVERY 6 HOURS PRN
COMMUNITY

## 2025-08-04 RX ORDER — ONDANSETRON 4 MG/1
4 TABLET, FILM COATED ORAL EVERY 12 HOURS PRN
Qty: 90 TABLET | Refills: 1 | Status: SHIPPED | OUTPATIENT
Start: 2025-08-04

## 2025-08-04 ASSESSMENT — ENCOUNTER SYMPTOMS
BACK PAIN: 0
CHEST TIGHTNESS: 0
APNEA: 0
ABDOMINAL DISTENTION: 0
ABDOMINAL PAIN: 0

## 2025-08-05 ENCOUNTER — RESULTS FOLLOW-UP (OUTPATIENT)
Facility: CLINIC | Age: 70
End: 2025-08-05

## 2025-08-05 LAB
25(OH)D3 SERPL-MCNC: 75 NG/ML (ref 30–100)
ALBUMIN SERPL-MCNC: 4.2 G/DL (ref 3.5–5.2)
ALBUMIN/GLOB SERPL: 1.4 (ref 1.1–2.2)
ALP SERPL-CCNC: 71 U/L (ref 35–104)
ALT SERPL-CCNC: 15 U/L (ref 10–35)
ANION GAP SERPL CALC-SCNC: 11 MMOL/L (ref 2–14)
AST SERPL-CCNC: 22 U/L (ref 10–35)
BILIRUB SERPL-MCNC: 0.6 MG/DL (ref 0–1.2)
BUN SERPL-MCNC: 17 MG/DL (ref 8–23)
BUN/CREAT SERPL: 20 (ref 12–20)
CALCIUM SERPL-MCNC: 9.8 MG/DL (ref 8.8–10.2)
CHLORIDE SERPL-SCNC: 100 MMOL/L (ref 98–107)
CHOLEST SERPL-MCNC: 156 MG/DL (ref 0–200)
CO2 SERPL-SCNC: 26 MMOL/L (ref 20–29)
CREAT SERPL-MCNC: 0.88 MG/DL (ref 0.6–1)
ERYTHROCYTE [DISTWIDTH] IN BLOOD BY AUTOMATED COUNT: 13.2 % (ref 11.5–14.5)
GLOBULIN SER CALC-MCNC: 3 G/DL (ref 2–4)
GLUCOSE SERPL-MCNC: 103 MG/DL (ref 65–100)
HCT VFR BLD AUTO: 40.9 % (ref 35–47)
HDLC SERPL-MCNC: 78 MG/DL (ref 40–60)
HDLC SERPL: 2
HGB BLD-MCNC: 12.8 G/DL (ref 11.5–16)
LDLC SERPL CALC-MCNC: 63 MG/DL
MCH RBC QN AUTO: 29.2 PG (ref 26–34)
MCHC RBC AUTO-ENTMCNC: 31.3 G/DL (ref 30–36.5)
MCV RBC AUTO: 93.4 FL (ref 80–99)
NRBC # BLD: 0 K/UL (ref 0–0.01)
NRBC BLD-RTO: 0 PER 100 WBC
PLATELET # BLD AUTO: 292 K/UL (ref 150–400)
PMV BLD AUTO: 11.2 FL (ref 8.9–12.9)
POTASSIUM SERPL-SCNC: 4.9 MMOL/L (ref 3.5–5.1)
PROT SERPL-MCNC: 7.2 G/DL (ref 6.4–8.3)
RBC # BLD AUTO: 4.38 M/UL (ref 3.8–5.2)
SODIUM SERPL-SCNC: 136 MMOL/L (ref 136–145)
TRIGL SERPL-MCNC: 73 MG/DL (ref 0–150)
VIT B12 SERPL-MCNC: >4000 PG/ML (ref 232–1245)
VLDLC SERPL CALC-MCNC: 15 MG/DL
WBC # BLD AUTO: 10.7 K/UL (ref 3.6–11)

## (undated) DEVICE — SYR LR LCK 1ML GRAD NSAF 30ML --

## (undated) DEVICE — PREP SKN CHLRAPRP APL 26ML STR --

## (undated) DEVICE — PADDING CST 6IN STERILE --

## (undated) DEVICE — ZIMMER® STERILE DISPOSABLE TOURNIQUET CUFF WITH PROTECTIVE SLEEVE AND PLC, DUAL PORT, SINGLE BLADDER, 34 IN. (86 CM)

## (undated) DEVICE — TAPE,CLOTH/SILK,CURAD,3"X10YD,LF,40/CS: Brand: CURAD

## (undated) DEVICE — COVER LT HNDL PLAS RIG 1 PER PK

## (undated) DEVICE — Device

## (undated) DEVICE — SUTURE MCRYL SZ 3-0 L27IN ABSRB UD L24MM PS-1 3/8 CIR PRIM Y936H

## (undated) DEVICE — XN CAL 1X3.0ML: Brand: XN CAL

## (undated) DEVICE — STERILE POLYISOPRENE POWDER-FREE SURGICAL GLOVES: Brand: PROTEXIS

## (undated) DEVICE — STRAP,POSITIONING,KNEE/BODY,FOAM,4X60": Brand: MEDLINE

## (undated) DEVICE — DRESSING FOAM W4XL12IN AG SIL ADH ANTIMIC POSTOP OPTIFOAM

## (undated) DEVICE — 3M™ IOBAN™ 2 ANTIMICROBIAL INCISE DRAPE 6648EZ: Brand: IOBAN™ 2

## (undated) DEVICE — HANDPIECE SET WITH COAXIAL HIGH FLOW TIP AND SUCTION TUBE: Brand: INTERPULSE

## (undated) DEVICE — MARKER,SKIN,WI/RULER AND LABELS: Brand: MEDLINE

## (undated) DEVICE — STERILE POLYISOPRENE POWDER-FREE SURGICAL GLOVES WITH EMOLLIENT COATING: Brand: PROTEXIS

## (undated) DEVICE — COVER,MAYO STAND,STERILE: Brand: MEDLINE

## (undated) DEVICE — DERMABOND SKIN ADH 0.7ML -- DERMABOND ADVANCED 12/BX

## (undated) DEVICE — INFECTION CONTROL KIT SYS

## (undated) DEVICE — BLADE SURG SAW STD S STL OSC W/ SERR EDGE DISP

## (undated) DEVICE — DECANTER BAG 9": Brand: MEDLINE INDUSTRIES, INC.

## (undated) DEVICE — HOOD: Brand: FLYTE

## (undated) DEVICE — REM POLYHESIVE ADULT PATIENT RETURN ELECTRODE: Brand: VALLEYLAB

## (undated) DEVICE — DUAL IRRIGATION ADAPTOR

## (undated) DEVICE — NEEDLE HYPO 18GA L1.5IN PNK S STL HUB POLYPR SHLD REG BVL

## (undated) DEVICE — NDL PRT INJ NSAF BLNT 18GX1.5 --

## (undated) DEVICE — STRYKER PERFORMANCE SERIES SAGITTAL BLADE: Brand: STRYKER PERFORMANCE SERIES

## (undated) DEVICE — PENCIL SMK EVAC L10FT DIA95MM TBNG NONSTICK W ADPT TO 22MM

## (undated) DEVICE — PREP KIT PEEL PTCH POVIDONE IOD

## (undated) DEVICE — BANDAGE COMPR W6INXL10YD ST M E WHITE/BEIGE

## (undated) DEVICE — KIT PROC KNE TRACKING PK/1 -- VIZADISC MAKO

## (undated) DEVICE — DRAPE,EXTREMITY,89X128,STERILE: Brand: MEDLINE

## (undated) DEVICE — SUTURE VCRL + SZ 1-0 L36IN ABSRB UD CTX 1/2 CIR TAPR PNT VCP977H

## (undated) DEVICE — 1010 S-DRAPE TOWEL DRAPE 10/BX: Brand: STERI-DRAPE™

## (undated) DEVICE — KIT INT FIX FEM TIB CKPT MAKOPLASTY

## (undated) DEVICE — SOLUTION IRRIG 3000ML 0.9% SOD CHL FLX CONT 0797208] ICU MEDICAL INC]

## (undated) DEVICE — SPONGE GZ W4XL4IN COT 12 PLY TYP VII WVN C FLD DSGN

## (undated) DEVICE — GOWN,SIRUS,NONRNF,SETINSLV,2XL,18/CS: Brand: MEDLINE

## (undated) DEVICE — SUTURE STRATAFIX SYMMETRIC SZ 1 L18IN ABSRB VLT CT1 L36CM SXPP1A404

## (undated) DEVICE — SUTURE VCRL SZ 2-0 L36IN ABSRB UD L36MM CT-1 1/2 CIR J945H

## (undated) DEVICE — 4-PORT MANIFOLD: Brand: NEPTUNE 2

## (undated) DEVICE — ELECTRODE BLDE L4IN NONINSULATED EDGE

## (undated) DEVICE — KIT DRP FOR RIO ROBOTIC ARM ASST SYS